# Patient Record
Sex: FEMALE | Race: WHITE | NOT HISPANIC OR LATINO | Employment: OTHER | URBAN - METROPOLITAN AREA
[De-identification: names, ages, dates, MRNs, and addresses within clinical notes are randomized per-mention and may not be internally consistent; named-entity substitution may affect disease eponyms.]

---

## 2017-01-03 ENCOUNTER — ALLSCRIPTS OFFICE VISIT (OUTPATIENT)
Dept: OTHER | Facility: OTHER | Age: 72
End: 2017-01-03

## 2017-01-03 ENCOUNTER — HOSPITAL ENCOUNTER (OUTPATIENT)
Dept: RADIOLOGY | Facility: CLINIC | Age: 72
Discharge: HOME | End: 2017-01-03
Payer: MEDICARE

## 2017-01-03 DIAGNOSIS — M25.511 PAIN IN RIGHT SHOULDER: ICD-10-CM

## 2017-01-03 DIAGNOSIS — M75.50 BURSITIS OF SHOULDER: ICD-10-CM

## 2017-01-03 PROCEDURE — 73030 X-RAY EXAM OF SHOULDER: CPT

## 2017-02-14 ENCOUNTER — ALLSCRIPTS OFFICE VISIT (OUTPATIENT)
Dept: OTHER | Facility: OTHER | Age: 72
End: 2017-02-14

## 2017-03-07 ENCOUNTER — ALLSCRIPTS OFFICE VISIT (OUTPATIENT)
Dept: OTHER | Facility: OTHER | Age: 72
End: 2017-03-07

## 2017-04-07 ENCOUNTER — ALLSCRIPTS OFFICE VISIT (OUTPATIENT)
Dept: OTHER | Facility: OTHER | Age: 72
End: 2017-04-07

## 2017-06-01 ENCOUNTER — GENERIC CONVERSION - ENCOUNTER (OUTPATIENT)
Dept: OTHER | Facility: OTHER | Age: 72
End: 2017-06-01

## 2017-06-03 ENCOUNTER — APPOINTMENT (EMERGENCY)
Dept: RADIOLOGY | Facility: HOSPITAL | Age: 72
End: 2017-06-03
Payer: MEDICARE

## 2017-06-03 ENCOUNTER — HOSPITAL ENCOUNTER (EMERGENCY)
Facility: HOSPITAL | Age: 72
Discharge: HOME | End: 2017-06-03
Attending: EMERGENCY MEDICINE | Admitting: EMERGENCY MEDICINE
Payer: MEDICARE

## 2017-06-03 VITALS
HEART RATE: 81 BPM | WEIGHT: 146 LBS | SYSTOLIC BLOOD PRESSURE: 132 MMHG | OXYGEN SATURATION: 98 % | TEMPERATURE: 98.3 F | BODY MASS INDEX: 28.51 KG/M2 | DIASTOLIC BLOOD PRESSURE: 76 MMHG | RESPIRATION RATE: 18 BRPM

## 2017-06-03 DIAGNOSIS — T07.XXXA MULTIPLE CONTUSIONS: ICD-10-CM

## 2017-06-03 DIAGNOSIS — S63.502A LEFT WRIST SPRAIN, INITIAL ENCOUNTER: Primary | ICD-10-CM

## 2017-06-03 PROCEDURE — 99284 EMERGENCY DEPT VISIT MOD MDM: CPT

## 2017-06-03 PROCEDURE — 73030 X-RAY EXAM OF SHOULDER: CPT

## 2017-06-03 PROCEDURE — 96372 THER/PROPH/DIAG INJ SC/IM: CPT

## 2017-06-03 PROCEDURE — 73110 X-RAY EXAM OF WRIST: CPT

## 2017-06-03 PROCEDURE — 73130 X-RAY EXAM OF HAND: CPT

## 2017-06-03 RX ORDER — OXYCODONE HYDROCHLORIDE AND ACETAMINOPHEN 5; 325 MG/1; MG/1
1 TABLET ORAL EVERY 4 HOURS PRN
Qty: 20 TABLET | Refills: 0 | Status: SHIPPED | OUTPATIENT
Start: 2017-06-03 | End: 2017-06-10

## 2017-06-03 RX ORDER — MORPHINE SULFATE 10 MG/ML
10 INJECTION, SOLUTION INTRAMUSCULAR; INTRAVENOUS ONCE
Status: COMPLETED | OUTPATIENT
Start: 2017-06-03 | End: 2017-06-03

## 2017-06-03 RX ORDER — ATORVASTATIN CALCIUM 20 MG/1
20 TABLET, FILM COATED ORAL EVERY MORNING
Status: ON HOLD | COMMUNITY
End: 2017-10-11

## 2017-06-03 RX ADMIN — MORPHINE SULFATE 10 MG: 10 INJECTION, SOLUTION INTRAMUSCULAR; INTRAVENOUS at 12:50

## 2017-06-16 ENCOUNTER — GENERIC CONVERSION - ENCOUNTER (OUTPATIENT)
Dept: OTHER | Facility: OTHER | Age: 72
End: 2017-06-16

## 2017-07-03 ENCOUNTER — APPOINTMENT (OUTPATIENT)
Dept: PHYSICAL THERAPY | Facility: CLINIC | Age: 72
End: 2017-07-03
Payer: MEDICARE

## 2017-07-03 PROCEDURE — 97162 PT EVAL MOD COMPLEX 30 MIN: CPT

## 2017-07-03 PROCEDURE — G8984 CARRY CURRENT STATUS: HCPCS

## 2017-07-03 PROCEDURE — G8985 CARRY GOAL STATUS: HCPCS

## 2017-07-06 ENCOUNTER — APPOINTMENT (OUTPATIENT)
Dept: PHYSICAL THERAPY | Facility: CLINIC | Age: 72
End: 2017-07-06
Payer: MEDICARE

## 2017-07-06 PROCEDURE — 97140 MANUAL THERAPY 1/> REGIONS: CPT

## 2017-07-06 PROCEDURE — 97110 THERAPEUTIC EXERCISES: CPT

## 2017-07-10 ENCOUNTER — GENERIC CONVERSION - ENCOUNTER (OUTPATIENT)
Dept: OTHER | Facility: OTHER | Age: 72
End: 2017-07-10

## 2017-07-11 ENCOUNTER — APPOINTMENT (OUTPATIENT)
Dept: PHYSICAL THERAPY | Facility: CLINIC | Age: 72
End: 2017-07-11
Payer: MEDICARE

## 2017-07-11 PROCEDURE — 97110 THERAPEUTIC EXERCISES: CPT

## 2017-07-11 PROCEDURE — 97140 MANUAL THERAPY 1/> REGIONS: CPT

## 2017-07-13 ENCOUNTER — APPOINTMENT (OUTPATIENT)
Dept: PHYSICAL THERAPY | Facility: CLINIC | Age: 72
End: 2017-07-13
Payer: MEDICARE

## 2017-07-13 PROCEDURE — 97110 THERAPEUTIC EXERCISES: CPT

## 2017-07-13 PROCEDURE — 97140 MANUAL THERAPY 1/> REGIONS: CPT

## 2017-07-14 ENCOUNTER — APPOINTMENT (OUTPATIENT)
Dept: PHYSICAL THERAPY | Facility: CLINIC | Age: 72
End: 2017-07-14
Payer: MEDICARE

## 2017-07-17 ENCOUNTER — APPOINTMENT (OUTPATIENT)
Dept: PHYSICAL THERAPY | Facility: CLINIC | Age: 72
End: 2017-07-17
Payer: MEDICARE

## 2017-07-21 ENCOUNTER — APPOINTMENT (OUTPATIENT)
Dept: PHYSICAL THERAPY | Facility: CLINIC | Age: 72
End: 2017-07-21
Payer: MEDICARE

## 2017-07-21 PROCEDURE — 97110 THERAPEUTIC EXERCISES: CPT

## 2017-07-21 PROCEDURE — 97140 MANUAL THERAPY 1/> REGIONS: CPT

## 2017-07-24 ENCOUNTER — APPOINTMENT (OUTPATIENT)
Dept: PHYSICAL THERAPY | Facility: CLINIC | Age: 72
End: 2017-07-24
Payer: MEDICARE

## 2017-07-24 PROCEDURE — 97140 MANUAL THERAPY 1/> REGIONS: CPT

## 2017-07-24 PROCEDURE — 97110 THERAPEUTIC EXERCISES: CPT

## 2017-07-26 ENCOUNTER — ALLSCRIPTS OFFICE VISIT (OUTPATIENT)
Dept: OTHER | Facility: OTHER | Age: 72
End: 2017-07-26

## 2017-07-27 ENCOUNTER — APPOINTMENT (OUTPATIENT)
Dept: PHYSICAL THERAPY | Facility: CLINIC | Age: 72
End: 2017-07-27
Payer: MEDICARE

## 2017-08-23 ENCOUNTER — ALLSCRIPTS OFFICE VISIT (OUTPATIENT)
Dept: OTHER | Facility: OTHER | Age: 72
End: 2017-08-23

## 2017-08-31 DIAGNOSIS — M75.82 OTHER SHOULDER LESIONS, LEFT SHOULDER: ICD-10-CM

## 2017-08-31 DIAGNOSIS — M19.019 PRIMARY OSTEOARTHRITIS OF SHOULDER: ICD-10-CM

## 2017-09-06 ENCOUNTER — APPOINTMENT (OUTPATIENT)
Dept: PHYSICAL THERAPY | Facility: CLINIC | Age: 72
End: 2017-09-06
Payer: MEDICARE

## 2017-09-06 PROCEDURE — G8984 CARRY CURRENT STATUS: HCPCS

## 2017-09-06 PROCEDURE — G8985 CARRY GOAL STATUS: HCPCS

## 2017-09-06 PROCEDURE — 97140 MANUAL THERAPY 1/> REGIONS: CPT

## 2017-09-06 PROCEDURE — 97110 THERAPEUTIC EXERCISES: CPT

## 2017-09-12 ENCOUNTER — APPOINTMENT (OUTPATIENT)
Dept: PHYSICAL THERAPY | Facility: CLINIC | Age: 72
End: 2017-09-12
Payer: MEDICARE

## 2017-09-12 PROCEDURE — 97140 MANUAL THERAPY 1/> REGIONS: CPT

## 2017-09-12 PROCEDURE — 97110 THERAPEUTIC EXERCISES: CPT

## 2017-09-14 ENCOUNTER — APPOINTMENT (OUTPATIENT)
Dept: PHYSICAL THERAPY | Facility: CLINIC | Age: 72
End: 2017-09-14
Payer: MEDICARE

## 2017-09-18 ENCOUNTER — GENERIC CONVERSION - ENCOUNTER (OUTPATIENT)
Dept: OTHER | Facility: OTHER | Age: 72
End: 2017-09-18

## 2017-09-18 ENCOUNTER — APPOINTMENT (OUTPATIENT)
Dept: PHYSICAL THERAPY | Facility: CLINIC | Age: 72
End: 2017-09-18
Payer: MEDICARE

## 2017-09-22 ENCOUNTER — OFFICE VISIT (OUTPATIENT)
Dept: LAB | Facility: HOSPITAL | Age: 72
End: 2017-09-22
Attending: ORTHOPAEDIC SURGERY
Payer: MEDICARE

## 2017-09-22 ENCOUNTER — TRANSCRIBE ORDERS (OUTPATIENT)
Dept: ADMINISTRATIVE | Facility: HOSPITAL | Age: 72
End: 2017-09-22

## 2017-09-22 ENCOUNTER — APPOINTMENT (OUTPATIENT)
Dept: PREADMISSION TESTING | Facility: HOSPITAL | Age: 72
End: 2017-09-22
Payer: MEDICARE

## 2017-09-22 VITALS — BODY MASS INDEX: 27.68 KG/M2 | WEIGHT: 141 LBS | HEIGHT: 60 IN

## 2017-09-22 DIAGNOSIS — Z01.818 ENCOUNTER FOR PREADMISSION TESTING: Primary | ICD-10-CM

## 2017-09-22 DIAGNOSIS — Z01.818 ENCOUNTER FOR PREADMISSION TESTING: ICD-10-CM

## 2017-09-22 LAB
ANION GAP SERPL CALCULATED.3IONS-SCNC: 9 MMOL/L (ref 4–13)
APTT PPP: 25 SECONDS (ref 23–35)
BASOPHILS # BLD AUTO: 0 THOUSANDS/ΜL (ref 0–0.1)
BASOPHILS NFR BLD AUTO: 0 % (ref 0–1)
BUN SERPL-MCNC: 15 MG/DL (ref 5–25)
CALCIUM SERPL-MCNC: 9.1 MG/DL (ref 8.3–10.1)
CHLORIDE SERPL-SCNC: 105 MMOL/L (ref 100–108)
CO2 SERPL-SCNC: 27 MMOL/L (ref 21–32)
CREAT SERPL-MCNC: 0.66 MG/DL (ref 0.6–1.3)
EOSINOPHIL # BLD AUTO: 0.2 THOUSAND/ΜL (ref 0–0.61)
EOSINOPHIL NFR BLD AUTO: 4 % (ref 0–6)
ERYTHROCYTE [DISTWIDTH] IN BLOOD BY AUTOMATED COUNT: 15.3 % (ref 11.6–15.1)
GFR SERPL CREATININE-BSD FRML MDRD: 88 ML/MIN/1.73SQ M
GLUCOSE P FAST SERPL-MCNC: 88 MG/DL (ref 65–99)
HCT VFR BLD AUTO: 39 % (ref 37–47)
HGB BLD-MCNC: 12.8 G/DL (ref 12–16)
INR PPP: 1.02 (ref 0.86–1.16)
LYMPHOCYTES # BLD AUTO: 1.9 THOUSANDS/ΜL (ref 0.6–4.47)
LYMPHOCYTES NFR BLD AUTO: 32 % (ref 14–44)
MCH RBC QN AUTO: 25.6 PG (ref 27–31)
MCHC RBC AUTO-ENTMCNC: 32.9 G/DL (ref 31.4–37.4)
MCV RBC AUTO: 78 FL (ref 82–98)
MONOCYTES # BLD AUTO: 0.5 THOUSAND/ΜL (ref 0.17–1.22)
MONOCYTES NFR BLD AUTO: 8 % (ref 4–12)
NEUTROPHILS # BLD AUTO: 3.3 THOUSANDS/ΜL (ref 1.85–7.62)
NEUTS SEG NFR BLD AUTO: 56 % (ref 43–75)
NRBC BLD AUTO-RTO: 0 /100 WBCS
PLATELET # BLD AUTO: 298 THOUSANDS/UL (ref 130–400)
PMV BLD AUTO: 7.4 FL (ref 8.9–12.7)
POTASSIUM SERPL-SCNC: 3.9 MMOL/L (ref 3.5–5.3)
PROTHROMBIN TIME: 10.7 SECONDS (ref 9.4–11.7)
RBC # BLD AUTO: 5.01 MILLION/UL (ref 4.2–5.4)
SODIUM SERPL-SCNC: 141 MMOL/L (ref 136–145)
WBC # BLD AUTO: 5.9 THOUSAND/UL (ref 4.8–10.8)

## 2017-09-22 PROCEDURE — 85730 THROMBOPLASTIN TIME PARTIAL: CPT

## 2017-09-22 PROCEDURE — 85610 PROTHROMBIN TIME: CPT

## 2017-09-22 PROCEDURE — 80048 BASIC METABOLIC PNL TOTAL CA: CPT

## 2017-09-22 PROCEDURE — 93005 ELECTROCARDIOGRAM TRACING: CPT

## 2017-09-22 PROCEDURE — 85025 COMPLETE CBC W/AUTO DIFF WBC: CPT | Performed by: ORTHOPAEDIC SURGERY

## 2017-09-22 PROCEDURE — 36415 COLL VENOUS BLD VENIPUNCTURE: CPT | Performed by: ORTHOPAEDIC SURGERY

## 2017-09-22 RX ORDER — DIPHENOXYLATE HYDROCHLORIDE AND ATROPINE SULFATE 2.5; .025 MG/1; MG/1
1 TABLET ORAL EVERY MORNING
COMMUNITY
End: 2022-05-02

## 2017-09-22 RX ORDER — LANOLIN ALCOHOL/MO/W.PET/CERES
3 CREAM (GRAM) TOPICAL
COMMUNITY
End: 2022-05-02

## 2017-09-22 RX ORDER — LEVOTHYROXINE SODIUM 88 UG/1
88 TABLET ORAL EVERY MORNING
Status: ON HOLD | COMMUNITY
End: 2017-10-11

## 2017-09-22 RX ORDER — ALBUTEROL SULFATE 0.63 MG/3ML
1 SOLUTION RESPIRATORY (INHALATION) EVERY 6 HOURS PRN
COMMUNITY

## 2017-09-22 RX ORDER — FAMOTIDINE 20 MG/1
20 TABLET, FILM COATED ORAL 2 TIMES DAILY
COMMUNITY
End: 2019-01-03

## 2017-09-25 LAB
ATRIAL RATE: 68 BPM
P AXIS: 61 DEGREES
PR INTERVAL: 180 MS
QRS AXIS: -23 DEGREES
QRSD INTERVAL: 72 MS
QT INTERVAL: 428 MS
QTC INTERVAL: 455 MS
T WAVE AXIS: 29 DEGREES
VENTRICULAR RATE: 68 BPM

## 2017-10-06 ENCOUNTER — HOSPITAL ENCOUNTER (INPATIENT)
Facility: HOSPITAL | Age: 72
LOS: 5 days | Discharge: HOME | DRG: 872 | End: 2017-10-11
Attending: EMERGENCY MEDICINE | Admitting: STUDENT IN AN ORGANIZED HEALTH CARE EDUCATION/TRAINING PROGRAM
Payer: MEDICARE

## 2017-10-06 ENCOUNTER — APPOINTMENT (EMERGENCY)
Dept: RADIOLOGY | Facility: HOSPITAL | Age: 72
DRG: 872 | End: 2017-10-06
Payer: MEDICARE

## 2017-10-06 DIAGNOSIS — R10.84 GENERALIZED ABDOMINAL PAIN: ICD-10-CM

## 2017-10-06 DIAGNOSIS — A41.9 SEPSIS (HCC): Primary | ICD-10-CM

## 2017-10-06 PROBLEM — E78.5 HLD (HYPERLIPIDEMIA): Status: ACTIVE | Noted: 2017-10-06

## 2017-10-06 PROBLEM — E03.9 HYPOTHYROIDISM: Status: ACTIVE | Noted: 2017-10-06

## 2017-10-06 PROBLEM — J45.909 ASTHMA: Status: ACTIVE | Noted: 2017-10-06

## 2017-10-06 PROBLEM — K27.9 PUD (PEPTIC ULCER DISEASE): Status: ACTIVE | Noted: 2017-10-06

## 2017-10-06 LAB
ALBUMIN SERPL BCP-MCNC: 3.7 G/DL (ref 3.5–5)
ALP SERPL-CCNC: 102 U/L (ref 46–116)
ALT SERPL W P-5'-P-CCNC: 29 U/L (ref 12–78)
ANION GAP SERPL CALCULATED.3IONS-SCNC: 12 MMOL/L (ref 4–13)
APTT PPP: 22 SECONDS (ref 23–35)
AST SERPL W P-5'-P-CCNC: 24 U/L (ref 5–45)
BACTERIA UR QL AUTO: ABNORMAL /HPF
BASOPHILS # BLD AUTO: 0 THOUSANDS/ΜL (ref 0–0.1)
BASOPHILS NFR BLD AUTO: 0 % (ref 0–1)
BILIRUB SERPL-MCNC: 0.5 MG/DL (ref 0.2–1)
BILIRUB UR QL STRIP: NEGATIVE
BUN SERPL-MCNC: 13 MG/DL (ref 5–25)
CALCIUM SERPL-MCNC: 9.4 MG/DL (ref 8.3–10.1)
CHLORIDE SERPL-SCNC: 104 MMOL/L (ref 100–108)
CK SERPL-CCNC: 77 U/L (ref 26–192)
CLARITY UR: CLEAR
CO2 SERPL-SCNC: 24 MMOL/L (ref 21–32)
COLOR UR: YELLOW
CREAT SERPL-MCNC: 0.86 MG/DL (ref 0.6–1.3)
EOSINOPHIL # BLD AUTO: 0 THOUSAND/ΜL (ref 0–0.61)
EOSINOPHIL NFR BLD AUTO: 1 % (ref 0–6)
ERYTHROCYTE [DISTWIDTH] IN BLOOD BY AUTOMATED COUNT: 15.3 % (ref 11.6–15.1)
GFR SERPL CREATININE-BSD FRML MDRD: 67 ML/MIN/1.73SQ M
GLUCOSE SERPL-MCNC: 100 MG/DL (ref 65–140)
GLUCOSE UR STRIP-MCNC: NEGATIVE MG/DL
HCT VFR BLD AUTO: 37.6 % (ref 37–47)
HGB BLD-MCNC: 12.3 G/DL (ref 12–16)
HGB UR QL STRIP.AUTO: ABNORMAL
INR PPP: 1.04 (ref 0.86–1.16)
KETONES UR STRIP-MCNC: NEGATIVE MG/DL
LACTATE SERPL-SCNC: 2 MMOL/L (ref 0.5–2)
LACTATE SERPL-SCNC: 2.9 MMOL/L (ref 0.5–2)
LACTATE SERPL-SCNC: 3 MMOL/L (ref 0.5–2)
LEUKOCYTE ESTERASE UR QL STRIP: ABNORMAL
LIPASE SERPL-CCNC: 67 U/L (ref 73–393)
LYMPHOCYTES # BLD AUTO: 0.5 THOUSANDS/ΜL (ref 0.6–4.47)
LYMPHOCYTES NFR BLD AUTO: 7 % (ref 14–44)
MCH RBC QN AUTO: 25.8 PG (ref 27–31)
MCHC RBC AUTO-ENTMCNC: 32.6 G/DL (ref 31.4–37.4)
MCV RBC AUTO: 79 FL (ref 82–98)
MONOCYTES # BLD AUTO: 0.2 THOUSAND/ΜL (ref 0.17–1.22)
MONOCYTES NFR BLD AUTO: 3 % (ref 4–12)
NEUTROPHILS # BLD AUTO: 5.9 THOUSANDS/ΜL (ref 1.85–7.62)
NEUTS SEG NFR BLD AUTO: 89 % (ref 43–75)
NITRITE UR QL STRIP: POSITIVE
NON-SQ EPI CELLS URNS QL MICRO: ABNORMAL /HPF
NRBC BLD AUTO-RTO: 0 /100 WBCS
OTHER STN SPEC: ABNORMAL
PH UR STRIP.AUTO: 6 [PH] (ref 5–9)
PLATELET # BLD AUTO: 202 THOUSANDS/UL (ref 130–400)
PLATELET # BLD AUTO: 233 THOUSANDS/UL (ref 130–400)
PLATELET BLD QL SMEAR: ADEQUATE
PMV BLD AUTO: 6.9 FL (ref 8.9–12.7)
PMV BLD AUTO: 7 FL (ref 8.9–12.7)
POTASSIUM SERPL-SCNC: 3.9 MMOL/L (ref 3.5–5.3)
PROT SERPL-MCNC: 7.1 G/DL (ref 6.4–8.2)
PROT UR STRIP-MCNC: NEGATIVE MG/DL
PROTHROMBIN TIME: 10.9 SECONDS (ref 9.4–11.7)
RBC # BLD AUTO: 4.75 MILLION/UL (ref 4.2–5.4)
RBC #/AREA URNS AUTO: ABNORMAL /HPF
SODIUM SERPL-SCNC: 140 MMOL/L (ref 136–145)
SP GR UR STRIP.AUTO: 1.01 (ref 1–1.03)
UROBILINOGEN UR QL STRIP.AUTO: 0.2 E.U./DL
WBC # BLD AUTO: 6.6 THOUSAND/UL (ref 4.8–10.8)
WBC #/AREA URNS AUTO: ABNORMAL /HPF

## 2017-10-06 PROCEDURE — 85049 AUTOMATED PLATELET COUNT: CPT | Performed by: STUDENT IN AN ORGANIZED HEALTH CARE EDUCATION/TRAINING PROGRAM

## 2017-10-06 PROCEDURE — 87086 URINE CULTURE/COLONY COUNT: CPT | Performed by: EMERGENCY MEDICINE

## 2017-10-06 PROCEDURE — 82550 ASSAY OF CK (CPK): CPT | Performed by: STUDENT IN AN ORGANIZED HEALTH CARE EDUCATION/TRAINING PROGRAM

## 2017-10-06 PROCEDURE — 36415 COLL VENOUS BLD VENIPUNCTURE: CPT | Performed by: EMERGENCY MEDICINE

## 2017-10-06 PROCEDURE — 96365 THER/PROPH/DIAG IV INF INIT: CPT

## 2017-10-06 PROCEDURE — 87077 CULTURE AEROBIC IDENTIFY: CPT | Performed by: EMERGENCY MEDICINE

## 2017-10-06 PROCEDURE — 83605 ASSAY OF LACTIC ACID: CPT | Performed by: EMERGENCY MEDICINE

## 2017-10-06 PROCEDURE — 87798 DETECT AGENT NOS DNA AMP: CPT | Performed by: STUDENT IN AN ORGANIZED HEALTH CARE EDUCATION/TRAINING PROGRAM

## 2017-10-06 PROCEDURE — 99285 EMERGENCY DEPT VISIT HI MDM: CPT

## 2017-10-06 PROCEDURE — 83690 ASSAY OF LIPASE: CPT | Performed by: EMERGENCY MEDICINE

## 2017-10-06 PROCEDURE — 87186 SC STD MICRODIL/AGAR DIL: CPT | Performed by: EMERGENCY MEDICINE

## 2017-10-06 PROCEDURE — 87081 CULTURE SCREEN ONLY: CPT | Performed by: STUDENT IN AN ORGANIZED HEALTH CARE EDUCATION/TRAINING PROGRAM

## 2017-10-06 PROCEDURE — 85730 THROMBOPLASTIN TIME PARTIAL: CPT | Performed by: EMERGENCY MEDICINE

## 2017-10-06 PROCEDURE — 81001 URINALYSIS AUTO W/SCOPE: CPT | Performed by: EMERGENCY MEDICINE

## 2017-10-06 PROCEDURE — 96375 TX/PRO/DX INJ NEW DRUG ADDON: CPT

## 2017-10-06 PROCEDURE — 83605 ASSAY OF LACTIC ACID: CPT | Performed by: STUDENT IN AN ORGANIZED HEALTH CARE EDUCATION/TRAINING PROGRAM

## 2017-10-06 PROCEDURE — 85025 COMPLETE CBC W/AUTO DIFF WBC: CPT | Performed by: EMERGENCY MEDICINE

## 2017-10-06 PROCEDURE — 85610 PROTHROMBIN TIME: CPT | Performed by: EMERGENCY MEDICINE

## 2017-10-06 PROCEDURE — 71010 HB CHEST X-RAY 1 VIEW FRONTAL (PORTABLE): CPT

## 2017-10-06 PROCEDURE — 87147 CULTURE TYPE IMMUNOLOGIC: CPT | Performed by: STUDENT IN AN ORGANIZED HEALTH CARE EDUCATION/TRAINING PROGRAM

## 2017-10-06 PROCEDURE — 93005 ELECTROCARDIOGRAM TRACING: CPT | Performed by: EMERGENCY MEDICINE

## 2017-10-06 PROCEDURE — 80053 COMPREHEN METABOLIC PANEL: CPT | Performed by: EMERGENCY MEDICINE

## 2017-10-06 PROCEDURE — 87040 BLOOD CULTURE FOR BACTERIA: CPT | Performed by: EMERGENCY MEDICINE

## 2017-10-06 RX ORDER — LEVOFLOXACIN 5 MG/ML
750 INJECTION, SOLUTION INTRAVENOUS EVERY 24 HOURS
Status: DISCONTINUED | OUTPATIENT
Start: 2017-10-06 | End: 2017-10-09

## 2017-10-06 RX ORDER — ASPIRIN 81 MG/1
81 TABLET, CHEWABLE ORAL EVERY MORNING
Status: DISCONTINUED | OUTPATIENT
Start: 2017-10-07 | End: 2017-10-11 | Stop reason: HOSPADM

## 2017-10-06 RX ORDER — ALBUTEROL SULFATE 2.5 MG/3ML
0.63 SOLUTION RESPIRATORY (INHALATION) EVERY 6 HOURS PRN
Status: DISCONTINUED | OUTPATIENT
Start: 2017-10-06 | End: 2017-10-11 | Stop reason: HOSPADM

## 2017-10-06 RX ORDER — MORPHINE SULFATE 10 MG/ML
6 INJECTION, SOLUTION INTRAMUSCULAR; INTRAVENOUS ONCE
Status: COMPLETED | OUTPATIENT
Start: 2017-10-06 | End: 2017-10-06

## 2017-10-06 RX ORDER — SODIUM CHLORIDE 9 MG/ML
75 INJECTION, SOLUTION INTRAVENOUS CONTINUOUS
Status: DISCONTINUED | OUTPATIENT
Start: 2017-10-06 | End: 2017-10-08

## 2017-10-06 RX ORDER — ATORVASTATIN CALCIUM 20 MG/1
20 TABLET, FILM COATED ORAL
Status: DISCONTINUED | OUTPATIENT
Start: 2017-10-07 | End: 2017-10-11 | Stop reason: HOSPADM

## 2017-10-06 RX ORDER — LEVOTHYROXINE SODIUM 88 UG/1
88 TABLET ORAL
Status: DISCONTINUED | OUTPATIENT
Start: 2017-10-07 | End: 2017-10-11 | Stop reason: HOSPADM

## 2017-10-06 RX ORDER — HEPARIN SODIUM 5000 [USP'U]/ML
5000 INJECTION, SOLUTION INTRAVENOUS; SUBCUTANEOUS EVERY 8 HOURS SCHEDULED
Status: DISCONTINUED | OUTPATIENT
Start: 2017-10-06 | End: 2017-10-11 | Stop reason: HOSPADM

## 2017-10-06 RX ORDER — LANOLIN ALCOHOL/MO/W.PET/CERES
3 CREAM (GRAM) TOPICAL
Status: DISCONTINUED | OUTPATIENT
Start: 2017-10-06 | End: 2017-10-11 | Stop reason: HOSPADM

## 2017-10-06 RX ORDER — FAMOTIDINE 20 MG/1
20 TABLET, FILM COATED ORAL 2 TIMES DAILY
Status: DISCONTINUED | OUTPATIENT
Start: 2017-10-06 | End: 2017-10-11 | Stop reason: HOSPADM

## 2017-10-06 RX ORDER — MORPHINE SULFATE 4 MG/ML
4 INJECTION, SOLUTION INTRAMUSCULAR; INTRAVENOUS ONCE
Status: COMPLETED | OUTPATIENT
Start: 2017-10-06 | End: 2017-10-06

## 2017-10-06 RX ADMIN — MELATONIN TAB 3 MG 3 MG: 3 TAB at 22:31

## 2017-10-06 RX ADMIN — MORPHINE SULFATE 6 MG: 10 INJECTION, SOLUTION INTRAMUSCULAR; INTRAVENOUS at 20:23

## 2017-10-06 RX ADMIN — PIPERACILLIN SODIUM,TAZOBACTAM SODIUM 4.5 G: 4; .5 INJECTION, POWDER, FOR SOLUTION INTRAVENOUS at 19:02

## 2017-10-06 RX ADMIN — LEVOFLOXACIN 750 MG: 5 INJECTION, SOLUTION INTRAVENOUS at 22:43

## 2017-10-06 RX ADMIN — SODIUM CHLORIDE 1000 ML: 0.9 INJECTION, SOLUTION INTRAVENOUS at 19:45

## 2017-10-06 RX ADMIN — HEPARIN SODIUM 5000 UNITS: 5000 INJECTION, SOLUTION INTRAVENOUS; SUBCUTANEOUS at 22:31

## 2017-10-06 RX ADMIN — FAMOTIDINE 20 MG: 20 TABLET ORAL at 22:31

## 2017-10-06 RX ADMIN — SODIUM CHLORIDE 1000 ML: 0.9 INJECTION, SOLUTION INTRAVENOUS at 20:24

## 2017-10-06 RX ADMIN — SODIUM CHLORIDE 1000 ML: 0.9 INJECTION, SOLUTION INTRAVENOUS at 18:57

## 2017-10-06 RX ADMIN — SODIUM CHLORIDE 75 ML/HR: 0.9 INJECTION, SOLUTION INTRAVENOUS at 20:19

## 2017-10-06 RX ADMIN — MORPHINE SULFATE 4 MG: 4 INJECTION, SOLUTION INTRAMUSCULAR; INTRAVENOUS at 19:10

## 2017-10-06 NOTE — ED NOTES
Received pt in semi fowlers on cardiac monitor with ST noted  VSS  Pt reports continued chronic pain to left shoulder (due for sx on Monday) pt additional pain no cough or cold symptoms  Skin hot and flushed  IV#20g noted right forearm  NS first liter completed, additional MD orders noted and will carry out       Oniel Craven RN  10/06/17 1881

## 2017-10-06 NOTE — ED NOTES
At this time pt now reporting sharp epigastric pain, +belchin noted, denies chest pain  No ectopy or change on cardiac monitor noted   MD made aware     Raj Silvestre, RN  10/06/17 1950

## 2017-10-07 LAB
ANION GAP SERPL CALCULATED.3IONS-SCNC: 10 MMOL/L (ref 4–13)
BUN SERPL-MCNC: 10 MG/DL (ref 5–25)
CALCIUM SERPL-MCNC: 8.2 MG/DL (ref 8.3–10.1)
CHLORIDE SERPL-SCNC: 106 MMOL/L (ref 100–108)
CO2 SERPL-SCNC: 23 MMOL/L (ref 21–32)
CREAT SERPL-MCNC: 0.63 MG/DL (ref 0.6–1.3)
ERYTHROCYTE [DISTWIDTH] IN BLOOD BY AUTOMATED COUNT: 15.4 % (ref 11.6–15.1)
FLUAV AG SPEC QL: NORMAL
FLUBV AG SPEC QL: NORMAL
GFR SERPL CREATININE-BSD FRML MDRD: 89 ML/MIN/1.73SQ M
GLUCOSE SERPL-MCNC: 109 MG/DL (ref 65–140)
HCT VFR BLD AUTO: 31.4 % (ref 37–47)
HGB BLD-MCNC: 10.3 G/DL (ref 12–16)
MCH RBC QN AUTO: 25.7 PG (ref 27–31)
MCHC RBC AUTO-ENTMCNC: 32.9 G/DL (ref 31.4–37.4)
MCV RBC AUTO: 78 FL (ref 82–98)
PLATELET # BLD AUTO: 178 THOUSANDS/UL (ref 130–400)
PMV BLD AUTO: 6.9 FL (ref 8.9–12.7)
POTASSIUM SERPL-SCNC: 3.8 MMOL/L (ref 3.5–5.3)
RBC # BLD AUTO: 4.02 MILLION/UL (ref 4.2–5.4)
RSV B RNA SPEC QL NAA+PROBE: NORMAL
SODIUM SERPL-SCNC: 139 MMOL/L (ref 136–145)
WBC # BLD AUTO: 5 THOUSAND/UL (ref 4.8–10.8)

## 2017-10-07 PROCEDURE — 85027 COMPLETE CBC AUTOMATED: CPT | Performed by: STUDENT IN AN ORGANIZED HEALTH CARE EDUCATION/TRAINING PROGRAM

## 2017-10-07 PROCEDURE — 94760 N-INVAS EAR/PLS OXIMETRY 1: CPT

## 2017-10-07 PROCEDURE — 80048 BASIC METABOLIC PNL TOTAL CA: CPT | Performed by: STUDENT IN AN ORGANIZED HEALTH CARE EDUCATION/TRAINING PROGRAM

## 2017-10-07 RX ORDER — ACETAMINOPHEN 325 MG/1
650 TABLET ORAL EVERY 6 HOURS PRN
Status: DISCONTINUED | OUTPATIENT
Start: 2017-10-07 | End: 2017-10-11 | Stop reason: HOSPADM

## 2017-10-07 RX ADMIN — FAMOTIDINE 20 MG: 20 TABLET ORAL at 17:13

## 2017-10-07 RX ADMIN — ACETAMINOPHEN 650 MG: 325 TABLET, FILM COATED ORAL at 15:51

## 2017-10-07 RX ADMIN — LEVOFLOXACIN 750 MG: 5 INJECTION, SOLUTION INTRAVENOUS at 20:42

## 2017-10-07 RX ADMIN — HEPARIN SODIUM 5000 UNITS: 5000 INJECTION, SOLUTION INTRAVENOUS; SUBCUTANEOUS at 14:08

## 2017-10-07 RX ADMIN — MELATONIN TAB 3 MG 3 MG: 3 TAB at 22:34

## 2017-10-07 RX ADMIN — LEVOTHYROXINE SODIUM 88 MCG: 88 TABLET ORAL at 06:09

## 2017-10-07 RX ADMIN — HEPARIN SODIUM 5000 UNITS: 5000 INJECTION, SOLUTION INTRAVENOUS; SUBCUTANEOUS at 06:09

## 2017-10-07 RX ADMIN — ATORVASTATIN CALCIUM 20 MG: 20 TABLET, FILM COATED ORAL at 15:52

## 2017-10-07 RX ADMIN — FAMOTIDINE 20 MG: 20 TABLET ORAL at 09:25

## 2017-10-07 RX ADMIN — HEPARIN SODIUM 5000 UNITS: 5000 INJECTION, SOLUTION INTRAVENOUS; SUBCUTANEOUS at 22:35

## 2017-10-07 NOTE — SEPSIS NOTE
Sepsis Note   Chuy Chamberlain 67 y o  female MRN: [id number]  Unit/Bed#: ED 08 Encounter: [id number]            Initial Sepsis Screening     Row Name 10/06/17 2023 10/06/17 1915             Is the patient's history suggestive of a new or worsening infection? (!)  Yes (Proceed)  -SR         Suspected source of infection suspect infection, source unknown  -SR suspect infection, source unknown  -JS       Are two or more of the following signs & symptoms of infection both present and new to the patient? (!)  Yes (Proceed)  -SR         Indicate SIRS criteria Hyperthemia > 38 3C (100 9F); Tachycardia > 90 bpm  -SR Hyperthemia > 38 3C (100 9F); Tachycardia > 90 bpm  -JS       If the answer is yes to both questions, suspicion of sepsis is present  [de-identified]         If severe sepsis is present AND tissue hypoperfusion perists in the hour after fluid resuscitation or lactate > 4, the patient meets criteria for SEPTIC SHOCK           Are any of the following organ dysfunction criteria present within 6 hours of suspected infection and SIRS criteria that are NOT considered to be chronic conditions? (!)  Yes  -SR (!)  Yes  -JS       Organ dysfunction Lactate > 2 0 mmol/L  -SR Lactate > 2 0 mmol/L  -JS       Date of presentation of severe sepsis           Time of presentation of severe sepsis           Tissue hypoperfusion persists in the hour after crystalloid fluid administration, evidenced, by either:           Was hypotension present within one hour of the conclusion of crystalloid fluid administration?  No  -SR No  -JS       Date of presentation of septic shock           Time of presentation of septic shock             User Key  (r) = Recorded By, (t) = Taken By, (c) = Cosigned By    [address] Name Provider Jaime Murphy MD Physician    Abel Britton DO Physician

## 2017-10-07 NOTE — SOCIAL WORK
DASH discussion completed  Discussed goals of making sure pt's needs are met upon discharge, pt's preferences are taken into account, pt understands her health condition, medications and symptoms to watch for after returning home and pt is aware of any follow up appointments recommended by hospital physician  SPOKE WITH THE PT AND HER SISTERS AT THE BEDSIDE  PER PT, SHE LIVES WITH HER  AND HAS A CANE AT HOME  PT STATED THAT SHE HAS DEFICITS TO HER UPPER EXTREMITIES AS SHE NEEDS SHOULDER SURGERY BUT HAS A CANE IF NEEDED FOR AMBULATION  PT WOULD LIKE TO SEE IF SHE CAN GET A BSC PRIOR TO DC AS SHE HAS DIFFICULTY WITH GETTING UP OUT OF HER CHAIR AND THEN GETTING TO THE BATHROOM IN TIME  HAVING A CLOSER CHAIR WOULD BE HELPFUL  WILL CONTINUE TO FOLLOW PT PROGRESS FOR THIS DME  PT UNCLEAR IF ABLE TO HAVE SURGERY, QUESTION IF WILL BE DURING THIS STAY OR AS AN OUTPT  CM WILL CONTINUE TO FOLLOW FOR COURSE OF STAY AND DCP NEEDS    PT USES [facility] ON [facility]

## 2017-10-07 NOTE — SEPSIS NOTE
Sepsis Note   Elan Martins 67 y o  female MRN: [id number]  Unit/Bed#: [address] [place] [address] Encounter: [id number]            Initial Sepsis Screening     Row Name 10/06/17 2023 10/06/17 1915             Is the patient's history suggestive of a new or worsening infection? (!)  Yes (Proceed)  -SR         Suspected source of infection suspect infection, source unknown  -SR suspect infection, source unknown  -JS       Are two or more of the following signs & symptoms of infection both present and new to the patient? (!)  Yes (Proceed)  -SR         Indicate SIRS criteria Hyperthemia > 38 3C (100 9F); Tachycardia > 90 bpm  -SR Hyperthemia > 38 3C (100 9F); Tachycardia > 90 bpm  -JS       If the answer is yes to both questions, suspicion of sepsis is present  [de-identified]         If severe sepsis is present AND tissue hypoperfusion perists in the hour after fluid resuscitation or lactate > 4, the patient meets criteria for SEPTIC SHOCK           Are any of the following organ dysfunction criteria present within 6 hours of suspected infection and SIRS criteria that are NOT considered to be chronic conditions? (!)  Yes  -SR (!)  Yes  -JS       Organ dysfunction Lactate > 2 0 mmol/L  -SR Lactate > 2 0 mmol/L  -JS       Date of presentation of severe sepsis           Time of presentation of severe sepsis           Tissue hypoperfusion persists in the hour after crystalloid fluid administration, evidenced, by either:           Was hypotension present within one hour of the conclusion of crystalloid fluid administration?  No  -SR No  -JS       Date of presentation of septic shock           Time of presentation of septic shock             User Key  (r) = Recorded By, (t) = Taken By, (c) = Cosigned By    [address] Name Provider Carolyn Price MD Physician    Zay [id number],  Physician               Default Flowsheet Data (last 720 hours)      Sepsis Reassessment     Row Name 10/07/17 0005                   Volume Status and Tissue Perfusion Post Fluid Resuscitation- Must Document ALL of the Following:    Vital Signs Reviewed Yes  -SR        Cardio Normal S1/S2  -SR        Pulmonary Normal effort  -SR        Capillary Refill Brisk  -SR        Peripheral Pulses Radial  -SR        Peripheral Pulse +2  -SR        Skin Warm  -SR           *OR*   Intensive Monitoring- Must Document Two * of the Following Four *:    Vital Signs Reviewed          * Central Venous Pressure (CVP or RAP)          * Central Venous Oxygen (SVO2, ScvO2 or Oxygen saturation via central catheter)          * Bedside Cardiovascular US in IVC diameter and % collapse          * Passive Leg Raise OR Crystalloid Challenge            User Key  (r) = Recorded By, (t) = Taken By, (c) = Cosigned By    Initials Name Provider Type    Kirt Vale MD Physician

## 2017-10-07 NOTE — ED NOTES
Pt left ed at this time in no apparent distress, 3rd liter NS infusing without difficulty     Saturnino Vela, RN  10/06/17 1289

## 2017-10-07 NOTE — PLAN OF CARE

## 2017-10-07 NOTE — PROGRESS NOTES
[facility] Progress Note  Patient: Molina Dobbins 67 y o  female   MRN: [id number]  PCP: Júnior Zheng  Unit/Bed#: [address] [place] [address] Encounter: [id number]  Date Of Visit: 10/07/17    Problem List:    Principal Problem:    Sepsis ([address] )  Active Problems:    Hypothyroidism    HLD (hyperlipidemia)    Asthma    PUD (peptic ulcer disease)      Assessment & Plan:    1  Sepsis likely secondary urinary tract infection-follow up final urine and blood cultures  Continue IV Levaquin 70 milligrams daily for now  2  Hypothyroidism-continue levothyroxine 88 microgram p o  daily  3  Hyperlipidemia-continue Lipitor 20 milligram p o  daily  4  Asthma-no evidence of exacerbation  Continue albuterol p r n   5  Peptic ulcer disease-continue Pepcid      VTE Pharmacologic Prophylaxis:   Pharmacologic: Enoxaparin (Lovenox)  Mechanical VTE Prophylaxis in Place: Yes    Patient Centered Rounds: I have performed bedside rounds with nursing staff today  Discussions with Specialists or Other Care Team Provider: No    Education and Discussions with Family / Patient:Yes    Time Spent for Care: 30 minutes  More than 50% of total time spent on counseling and coordination of care as described above  Current Length of Stay: 1 day(s)    Current Patient Status: Inpatient     Discharge Plan: Home    Code Status: Level 1 - Full Code      Subjective:   Patient reported 3 episodes of vomiting last night  Patient also complains of epigastric abdominal pain and also suprapubic abdominal pain  Patient denies any chest pain, shortness of breath, cough, diarrhea or urinary complaints  Objective:         Negative for Chest Pain, Palpitations, Shortness of Breath, Abdominal Pain, Nausea, Vomiting, Constipation, Diarrhea, Dizziness  All other 10 review of systems negative and without drastic interval changes from yesterday      Vitals:   Temp (24hrs), Av 3 °F (37 9 °C), Min:97 8 °F (36 6 °C), Max:103 9 °F (39 9 °C)    HR: [] 72  Resp:  [16-20] 20  BP: (103-163)/(56-81) 118/73  SpO2:  [92 %-95 %] 95 %  Body mass index is 27 34 kg/m²  Input and Output Summary (last 24 hours): Intake/Output Summary (Last 24 hours) at 10/07/17 1657  Last data filed at 10/07/17 1200   Gross per 24 hour   Intake             1000 ml   Output             1025 ml   Net              -25 ml       Physical Exam:     Physical Exam   Constitutional: No distress  HENT:   Head: Normocephalic and atraumatic  Nose: Nose normal    Eyes: Conjunctivae and EOM are normal  Pupils are equal, round, and reactive to light  Neck: Normal range of motion  Neck supple  No JVD present  Cardiovascular: Normal rate and regular rhythm  Exam reveals no gallop and no friction rub  Murmur heard  Pulmonary/Chest: Effort normal and breath sounds normal  No respiratory distress  She has no wheezes  She has no rales  She exhibits no tenderness  Abdominal: Soft  Bowel sounds are normal  She exhibits no distension  There is no tenderness  There is no rebound and no guarding  Musculoskeletal: She exhibits no edema  Neurological: She is alert  No cranial nerve deficit  Skin: Skin is warm and dry  No rash noted  Psychiatric: She has a normal mood and affect  Additional Data:     Labs:      Results from last 7 days  Lab Units 10/07/17  0535  10/06/17  1808   WBC Thousand/uL 5 00  --  6 60   HEMOGLOBIN g/dL 10 3*  --  12 3   HEMATOCRIT % 31 4*  --  37 6   PLATELETS Thousands/uL 178  < > 233   NEUTROS PCT %  --   --  89*   LYMPHS PCT %  --   --  7*   MONOS PCT %  --   --  3*   EOS PCT %  --   --  1   < > = values in this interval not displayed      Results from last 7 days  Lab Units 10/07/17  0535 10/06/17  1808   SODIUM mmol/L 139 140   POTASSIUM mmol/L 3 8 3 9   CHLORIDE mmol/L 106 104   CO2 mmol/L 23 24   BUN mg/dL 10 13   CREATININE mg/dL 0 63 0 86   CALCIUM mg/dL 8 2* 9 4   TOTAL PROTEIN g/dL  --  7 1   BILIRUBIN TOTAL mg/dL  --  0 50   ALK PHOS U/L --  102   ALT U/L  --  29   AST U/L  --  24   GLUCOSE RANDOM mg/dL 109 100       Results from last 7 days  Lab Units 10/06/17  1808   INR  1 04       * I Have Reviewed All Lab Data Listed Above  * Additional Pertinent Lab Tests Reviewed: L[address] 65 Admission Reviewed    Imaging:  Xr Chest 1 View Portable    Result Date: 10/6/2017  Narrative: CHEST INDICATION:  Fever COMPARISON:  Chest radiograph 2/14/2016 VIEWS:   AP frontal IMAGES:  1 FINDINGS:     Cardiomediastinal silhouette appears unremarkable  There is platelike atelectasis at the left lung base  No large pleural effusion or evident pneumothorax  Visualized osseous structures appear within normal limits for the patient's age  Impression: Left basilar atelectasis  Workstation performed: [id number]     Imaging Reports Reviewed by myself    Cultures:   Blood Culture: No results found for: BLOODCX  Urine Culture: No results found for: URINECX  Sputum Culture: No components found for: SPUTUMCX  Wound Culture: No results found for: WOUNDCULT    Last 24 Hours Medication List:     aspirin 81 mg Oral QAM   atorvastatin 20 mg Oral Daily With Dinner   famotidine 20 mg Oral BID   heparin (porcine) 5,000 Units Subcutaneous Q8H [address]   levofloxacin 750 mg Intravenous Q24H   levothyroxine 88 mcg Oral Early Morning   melatonin 3 mg Oral HS   sodium chloride 1,000 mL Intravenous Once   Followed by      sodium chloride 1,000 mL Intravenous Once        Today, Patient Was Seen By: Haylee Whittaker MD    ** Please Note: Dragon 360 Dictation voice to text software may have been used in the creation of this document   **

## 2017-10-07 NOTE — ED PROVIDER NOTES
History  Chief Complaint   Patient presents with    Fever - 9 weeks to 74 years     pt states that she had a flu shot aroun 1300  pt states that she spiked a fever shortly after  pt presents to the ed with a temp of 103 9  oral  pt has no other complaints      Patient presents for evaluation of fever  Patient states she got flu shot at her doctors office around 1300 and then got a fever shortly afterwards  Denies dysuria, cough or congestion  History provided by:  Patient   used: No    Fever - 9 weeks to 74 years       Prior to Admission Medications   Prescriptions Last Dose Informant Patient Reported? Taking? NON FORMULARY 10/5/2017 at 2100  Yes Yes   Sig: daily at bedtime Stool softner   NON FORMULARY 10/5/2017 at 2100  Yes Yes   Si capsule daily at bedtime as needed Oxy powder-   POTASSIUM CHLORIDE PO Past Week at Unknown time  Yes Yes   Sig: Take by mouth every morning   Probiotic Product (PROBIOTIC DAILY PO) Past Week at Unknown time  Yes Yes   Sig: Take by mouth every morning   albuterol (ACCUNEB) 0 63 MG/3ML nebulizer solution More than a month at Unknown time  Yes No   Sig: Take 1 ampule by nebulization every 6 (six) hours as needed for wheezing   albuterol (PROVENTIL HFA,VENTOLIN HFA) 90 mcg/act inhaler 10/6/2017 at 1300  Yes Yes   Sig: Inhale 2 puffs every 6 (six) hours as needed for wheezing     aspirin 81 mg chewable tablet Past Week at Unknown time  Yes Yes   Sig: Chew 81 mg every morning     atorvastatin (LIPITOR) 20 mg tablet 10/6/2017 at 0700  Yes Yes   Sig: Take 20 mg by mouth every morning     baclofen 20 mg tablet More than a month at Unknown time  Yes No   Sig: Take 20 mg by mouth as needed     cycloSPORINE (RESTASIS) 0 05 % ophthalmic emulsion 10/6/2017 at 0700  Yes Yes   Sig: Administer 1 drop to both eyes 2 (two) times a day     famotidine (PEPCID) 20 mg tablet 10/6/2017 at 0700  Yes Yes   Sig: Take 20 mg by mouth 2 (two) times a day   levothyroxine 88 mcg tablet 10/6/2017 at 0600  Yes Yes   Sig: Take 88 mcg by mouth every morning   melatonin 3 mg Past Week at Unknown time  Yes Yes   Sig: Take 3 mg by mouth daily at bedtime as needed   mometasone (NASONEX) 50 mcg/act nasal spray More than a month at Unknown time  Yes No   Si sprays into each nostril as needed     multivitamin (THERAGRAN) TABS Past Week at Unknown time  Yes Yes   Sig: Take 1 tablet by mouth every morning      Facility-Administered Medications: None       Past Medical History:   Diagnosis Date    Ankle fracture, right     casted    Anxiety     Arthritis     Asthma     Bursitis     Constipation     Cystitis     chronic    Diverticulosis     Fibromyalgia     Fibromyalgia, primary     GERD (gastroesophageal reflux disease)     H/o Lyme disease     Hiatal hernia     History of transfusion     Hyperlipidemia     Hypothyroid     hypo    Lichen sclerosus of female genitalia 2016    Murmur     Neck pain, chronic     gets steroid injections-none since late 2016    Neuralgia     Spinal stenosis     Ulcer (HCC)     Ulcer of gastric fundus     Wears glasses     Wears partial dentures     upper and lower       Past Surgical History:   Procedure Laterality Date    ABDOMINAL SURGERY      exploratory-removal of appendix    APPENDECTOMY      CARPAL TUNNEL RELEASE Bilateral     CHOLECYSTECTOMY      COLONOSCOPY      DILATION AND CURETTAGE OF UTERUS      x3 miscarriages    FOOT SURGERY Right     5th toe-hammertoe repair    HYSTERECTOMY      complete    ROTATOR CUFF REPAIR Right     TONSILLECTOMY         Family History   Problem Relation Age of Onset    Cancer Mother      colon    Alzheimer's disease Father     Heart disease Sister      MI x4-angioplasty x4 stents    Cancer Brother      brain tumor-age 6    Cancer Brother 72     prostate     I have reviewed and agree with the history as documented      Social History   Substance Use Topics    Smoking status: Never Smoker    Smokeless tobacco: Never Used    Alcohol use No        Review of Systems   Constitutional: Positive for fever  All other systems reviewed and are negative  Physical Exam  ED Triage Vitals   Temperature Pulse Respirations Blood Pressure SpO2   10/06/17 1802 10/06/17 1802 10/06/17 1802 10/06/17 1802 10/06/17 1802   (!) 103 9 °F (39 9 °C) (!) 121 18 163/73 93 %      Temp Source Heart Rate Source Patient Position - Orthostatic VS BP Location FiO2 (%)   10/06/17 1802 10/06/17 1802 10/06/17 1942 10/06/17 1802 --   Oral Monitor Sitting Right arm       Pain Score       10/06/17 1857       Worst Possible Pain           Physical Exam   Constitutional: She is oriented to person, place, and time  No distress  HENT:   Mouth/Throat: Oropharynx is clear and moist    Eyes: Pupils are equal, round, and reactive to light  Neck: Normal range of motion  Neck supple  Cardiovascular: Regular rhythm and intact distal pulses  Tachycardia present  Pulmonary/Chest: Effort normal and breath sounds normal  No respiratory distress  Abdominal: Soft  Bowel sounds are normal  There is no tenderness  Musculoskeletal: Normal range of motion  Neurological: She is alert and oriented to person, place, and time  Skin: Capillary refill takes less than 2 seconds  She is not diaphoretic  Nursing note and vitals reviewed        ED Medications  Medications   sodium chloride 0 9 % infusion (75 mL/hr Intravenous New Bag 10/6/17 2019)   sodium chloride 0 9 % bolus 1,000 mL (1,000 mL Intravenous Not Given 10/6/17 2015)     Followed by   sodium chloride 0 9 % bolus 1,000 mL (1,000 mL Intravenous Not Given 10/6/17 2045)   levofloxacin (LEVAQUIN) IVPB (premix) 750 mg (750 mg Intravenous New Bag 10/6/17 2243)   albuterol inhalation solution 0 63 mg (not administered)   aspirin chewable tablet 81 mg (not administered)   atorvastatin (LIPITOR) tablet 20 mg (not administered)   famotidine (PEPCID) tablet 20 mg (20 mg Oral Given 10/6/17 2231) levothyroxine tablet 88 mcg (not administered)   melatonin tablet 3 mg (3 mg Oral Given 10/6/17 2231)   heparin (porcine) subcutaneous injection 5,000 Units (5,000 Units Subcutaneous Given 10/6/17 2231)   sodium chloride 0 9 % bolus 1,000 mL (0 mL Intravenous Stopped 10/6/17 1943)   sodium chloride 0 9 % bolus 1,000 mL (0 mL Intravenous Stopped 10/6/17 2134)   piperacillin-tazobactam (ZOSYN) IVPB 4 5 g (0 g Intravenous Stopped 10/6/17 1943)   morphine (PF) 4 mg/mL injection 4 mg (4 mg Intravenous Given 10/6/17 1910)   sodium chloride 0 9 % bolus 1,000 mL (0 mL Intravenous Stopped 10/6/17 2135)   morphine (PF) 10 mg/mL injection 6 mg (6 mg Intravenous Given 10/6/17 2023)       Diagnostic Studies  Labs Reviewed   CBC AND DIFFERENTIAL - Abnormal        Result Value Ref Range Status    MCV 79 (*) 82 - 98 fL Final    MCH 25 8 (*) 27 0 - 31 0 pg Final    RDW 15 3 (*) 11 6 - 15 1 % Final    MPV 7 0 (*) 8 9 - 12 7 fL Final    Neutrophils Relative 89 (*) 43 - 75 % Final    Lymphocytes Relative 7 (*) 14 - 44 % Final    Monocytes Relative 3 (*) 4 - 12 % Final    Lymphocytes Absolute 0 50 (*) 0 60 - 4 47 Thousands/µL Final    WBC 6 60  4 80 - 10 80 Thousand/uL Final    RBC 4 75  4 20 - 5 40 Million/uL Final    Hemoglobin 12 3  12 0 - 16 0 g/dL Final    Hematocrit 37 6  37 0 - 47 0 % Final    MCHC 32 6  31 4 - 37 4 g/dL Final    Platelets 805  495 - 400 Thousands/uL Final    nRBC 0  /100 WBCs Final    Eosinophils Relative 1  0 - 6 % Final    Basophils Relative 0  0 - 1 % Final    Neutrophils Absolute 5 90  1 85 - 7 62 Thousands/µL Final    Monocytes Absolute 0 20  0 17 - 1 22 Thousand/µL Final    Eosinophils Absolute 0 00  0 00 - 0 61 Thousand/µL Final    Basophils Absolute 0 00  0 00 - 0 10 Thousands/µL Final   APTT - Abnormal     PTT 22 (*) 23 - 35 seconds Final    Narrative:      Therapeutic Heparin Range = 60-90 seconds   UA W REFLEX TO MICROSCOPIC WITH REFLEX TO CULTURE - Abnormal     Leukocytes, UA Moderate (*) Negative Final    Nitrite, UA Positive (*) Negative Final    Blood, UA Trace-Intact (*) Negative Final    Color, UA Yellow   Final    Clarity, UA Clear   Final    Specific [place], UA 1 010  1 000 - 1 030 Final    pH, UA 6 0  5 0 - 9 0 Final    Protein, UA Negative  Negative mg/dl Final    Glucose, UA Negative  Negative mg/dl Final    Ketones, UA Negative  Negative mg/dl Final    Urobilinogen, UA 0 2  0 2, 1 0 E U /dl E U /dl Final    Bilirubin, UA Negative  Negative Final   LACTIC ACID, PLASMA - Abnormal     LACTIC ACID 3 0 (*) 0 5 - 2 0 mmol/L Final    Narrative:     Result may be elevated if tourniquet was used during collection  LIPASE - Abnormal     Lipase 67 (*) 73 - 393 u/L Final   LACTIC ACID, PLASMA - Abnormal     LACTIC ACID 2 9 (*) 0 5 - 2 0 mmol/L Final    Narrative:     Result may be elevated if tourniquet was used during collection     URINE MICROSCOPIC - Abnormal     RBC, UA 1-2 (*) None Seen, 0-5 /hpf Final    WBC, UA 20-30 (*) None Seen, 0-5, 5-55, 5-65 /hpf Final    Bacteria, UA Moderate (*) None Seen, Occasional /hpf Final    Epithelial Cells Occasional  None Seen, Occasional /hpf Final    OTHER OBSERVATIONS Renal Epithelial Cells Present   Final   PROTIME-INR - Normal    Protime 10 9  9 4 - 11 7 seconds Final    INR 1 04  0 86 - 1 16 Final   CK - Normal    Total CK 77  26 - 192 U/L Final   SMEAR REVIEW(PHLEBS DO NOT ORDER) - Normal    Platelet Estimate Adequate  Adequate Final   BLOOD CULTURE   BLOOD CULTURE   INFLUENZA A/B AND RSV, PCR   URINE CULTURE   COMPREHENSIVE METABOLIC PANEL    Sodium 983  136 - 145 mmol/L Final    Potassium 3 9  3 5 - 5 3 mmol/L Final    Chloride 104  100 - 108 mmol/L Final    CO2 24  21 - 32 mmol/L Final    Anion Gap 12  4 - 13 mmol/L Final    BUN 13  5 - 25 mg/dL Final    Creatinine 0 86  0 60 - 1 30 mg/dL Final    Comment: Standardized to IDMS reference method    Glucose 100  65 - 140 mg/dL Final    Comment:   If the patient is fasting, the ADA then defines impaired fasting glucose as > 100 mg/dL and diabetes as > or equal to 123 mg/dL  Specimen collection should occur prior to Sulfasalazine administration due to the potential for falsely depressed results  Specimen collection should occur prior to Sulfapyridine administration due to the potential for falsely elevated results  Calcium 9 4  8 3 - 10 1 mg/dL Final    AST 24  5 - 45 U/L Final    Comment:   Specimen collection should occur prior to Sulfasalazine administration due to the potential for falsely depressed results  ALT 29  12 - 78 U/L Final    Comment:   Specimen collection should occur prior to Sulfasalazine administration due to the potential for falsely depressed results  Alkaline Phosphatase 102  46 - 116 U/L Final    Total Protein 7 1  6 4 - 8 2 g/dL Final    Albumin 3 7  3 5 - 5 0 g/dL Final    Total Bilirubin 0 50  0 20 - 1 00 mg/dL Final    eGFR 67  ml/min/1 73sq m Final    Narrative:     National Kidney Disease Education Program recommendations are as follows:  GFR calculation is accurate only with a steady state creatinine  Chronic Kidney disease less than 60 ml/min/1 73 sq  meters  Kidney failure less than 15 ml/min/1 73 sq  meters  LACTIC ACID, PLASMA       XR chest 1 view portable   Final Result      Left basilar atelectasis  Workstation performed: [id number]             Procedures  Procedures      Phone Contacts  ED Phone Contact    ED Course  ED Course as of Oct 06 2336   Fri Oct 06, 2017   1807 Pulse ox 93% on RA indicating adequatye SpO2: 93 %                         Initial Sepsis Screening     Row Name 10/06/17 2023 10/06/17 1915             Is the patient's history suggestive of a new or worsening infection? (!)  Yes (Proceed)  -SR         Suspected source of infection suspect infection, source unknown  -SR suspect infection, source unknown  -JS       Are two or more of the following signs & symptoms of infection both present and new to the patient?  (!)  Yes (Proceed)  -SR   Indicate SIRS criteria Hyperthemia > 38 3C (100 9F); Tachycardia > 90 bpm  -SR Hyperthemia > 38 3C (100 9F); Tachycardia > 90 bpm  -JS       If the answer is yes to both questions, suspicion of sepsis is present  [de-identified]         If severe sepsis is present AND tissue hypoperfusion perists in the hour after fluid resuscitation or lactate > 4, the patient meets criteria for SEPTIC SHOCK           Are any of the following organ dysfunction criteria present within 6 hours of suspected infection and SIRS criteria that are NOT considered to be chronic conditions? (!)  Yes  -SR (!)  Yes  -JS       Organ dysfunction Lactate > 2 0 mmol/L  -SR Lactate > 2 0 mmol/L  -JS       Date of presentation of severe sepsis           Time of presentation of severe sepsis           Tissue hypoperfusion persists in the hour after crystalloid fluid administration, evidenced, by either:           Was hypotension present within one hour of the conclusion of crystalloid fluid administration?  No  -SR No  -JS       Date of presentation of septic shock           Time of presentation of septic shock             User Key  (r) = Recorded By, (t) = Taken By, (c) = Cosigned By    [address] Name Provider Harriet Navarrete MD Physician    Ezequiel Clark,  Physician                  MDM  Number of Diagnoses or Management Options  Sepsis [facility]):   Diagnosis management comments: CXR: NAD as read by me       Amount and/or Complexity of Data Reviewed  Clinical lab tests: ordered and reviewed  Tests in the radiology section of CPT®: reviewed and ordered  Discuss the patient with other providers: yes  Independent visualization of images, tracings, or specimens: yes    Patient Progress  Patient progress: stable    CritCare Time    Disposition  Final diagnoses:   Sepsis [facility])     ED Disposition     ED Disposition Condition Comment    Admit  Case was discussed with Dr Santos Ramírez and the patient's admission status was agreed to be admission to the service of Dr Calvin Jacques  Follow-up Information    None       Current Discharge Medication List      CONTINUE these medications which have NOT CHANGED    Details   albuterol (PROVENTIL HFA,VENTOLIN HFA) 90 mcg/act inhaler Inhale 2 puffs every 6 (six) hours as needed for wheezing  aspirin 81 mg chewable tablet Chew 81 mg every morning        atorvastatin (LIPITOR) 20 mg tablet Take 20 mg by mouth every morning        cycloSPORINE (RESTASIS) 0 05 % ophthalmic emulsion Administer 1 drop to both eyes 2 (two) times a day        famotidine (PEPCID) 20 mg tablet Take 20 mg by mouth 2 (two) times a day      levothyroxine 88 mcg tablet Take 88 mcg by mouth every morning      melatonin 3 mg Take 3 mg by mouth daily at bedtime as needed      multivitamin (THERAGRAN) TABS Take 1 tablet by mouth every morning      !! NON FORMULARY daily at bedtime Stool softner      !! NON FORMULARY 1 capsule daily at bedtime as needed Oxy powder-      POTASSIUM CHLORIDE PO Take by mouth every morning      Probiotic Product (PROBIOTIC DAILY PO) Take by mouth every morning      albuterol (ACCUNEB) 0 63 MG/3ML nebulizer solution Take 1 ampule by nebulization every 6 (six) hours as needed for wheezing      baclofen 20 mg tablet Take 20 mg by mouth as needed        mometasone (NASONEX) 50 mcg/act nasal spray 2 sprays into each nostril as needed         !! - Potential duplicate medications found  Please discuss with provider  No discharge procedures on file      ED Provider  Electronically Signed by       Mel Keene DO  10/06/17 8989

## 2017-10-07 NOTE — PLAN OF CARE
INFECTION - ADULT     Absence or prevention of progression during hospitalization Progressing     Absence of fever/infection during neutropenic period Progressing        PAIN - ADULT     Verbalizes/displays adequate comfort level or baseline comfort level Progressing        Potential for Falls     Patient will remain free of falls Progressing        RESPIRATORY - ADULT     Achieves optimal ventilation and oxygenation Progressing

## 2017-10-07 NOTE — H&P
History and Physical - [facility] Internal Medicine    Patient Information: Molina Dobbins 67 y o  female MRN: [id number]  Unit/Bed#: ED 08 Encounter: [id number]  Admitting Physician: Grant Jaime MD  PCP: Júnior Zheng  Date of Admission:  10/06/17    Chief Complaint:     Fever, chills, sinus congestion     History of Present Illness:    Molina Dobbins is a 67 y o  female with a PMH of Hypothyroidism, HLD, Asthma and PUD who presents with fever, chills, and sinus congestion  She states that she received her flu shot around 1pm today and several hours afterwards began to have shaking chills  She checked her temperature which was 103 9F  She also noticed diffuse myalgias  Due to this she came to the ED for further evaluation  She denies any recent hospitalizations but does report that she had one day of dysuria several days ago which resolved spontaneously  She also reports being told by her physician that she had a "C diff infection" several weeks ago but did not receive antibiotics  Currently she reports mild midepigastric pain which began several minutes ago and her chronic left shoulder pain  She denies any cough but does reports some sinus congestion  She denies any chest pain, shortness of breath, diarrhea, dysuria, melena or blood in her stools  No other complaints or concerns  Review of Systems:    Review of Systems   Constitutional: Positive for chills and fever  HENT: Positive for congestion and sinus pressure  Respiratory: Negative for cough and shortness of breath  Cardiovascular: Negative for chest pain and leg swelling  Gastrointestinal: Positive for abdominal pain  Negative for blood in stool and nausea  Genitourinary: Positive for dysuria  Negative for hematuria  Musculoskeletal: Positive for arthralgias  Neurological: Negative for syncope  Psychiatric/Behavioral: Negative for confusion         Past Medical and Surgical History:     Past Medical History:   Diagnosis Date  Ankle fracture, right     casted    Anxiety     Arthritis     Bursitis     Constipation     Cystitis     chronic    Diverticulosis     Fibromyalgia     Fibromyalgia, primary     GERD (gastroesophageal reflux disease)     H/o Lyme disease     Hiatal hernia     History of transfusion     Hyperlipidemia     Hypothyroid     hypo    Lichen sclerosus of female genitalia 2016    Murmur     Neck pain, chronic     gets steroid injections-none since late 2016    Neuralgia     Spinal stenosis     Ulcer ([address] )     Ulcer of gastric fundus     Wears glasses     Wears partial dentures     upper and lower       Past Surgical History:   Procedure Laterality Date    ABDOMINAL SURGERY      exploratory-removal of appendix    APPENDECTOMY      CARPAL TUNNEL RELEASE Bilateral     CHOLECYSTECTOMY      COLONOSCOPY      DILATION AND CURETTAGE OF UTERUS      x3 miscarriages    FOOT SURGERY Right     5th toe-hammertoe repair    HYSTERECTOMY      complete    ROTATOR CUFF REPAIR Right     TONSILLECTOMY         Meds/Allergies:    PTA meds:   Prior to Admission Medications   Prescriptions Last Dose Informant Patient Reported? Taking? NON FORMULARY   Yes No   Sig: daily at bedtime Stool softner   NON FORMULARY   Yes No   Si capsule daily at bedtime as needed Oxy powder-   POTASSIUM CHLORIDE PO   Yes No   Sig: Take by mouth every morning   Probiotic Product (PROBIOTIC DAILY PO)   Yes No   Sig: Take by mouth every morning   albuterol (ACCUNEB) 0 63 MG/3ML nebulizer solution   Yes No   Sig: Take 1 ampule by nebulization every 6 (six) hours as needed for wheezing   albuterol (PROVENTIL HFA,VENTOLIN HFA) 90 mcg/act inhaler   Yes No   Sig: Inhale 2 puffs every 6 (six) hours as needed for wheezing     aspirin 81 mg chewable tablet   Yes No   Sig: Chew 81 mg every morning     atorvastatin (LIPITOR) 20 mg tablet   Yes No   Sig: Take 20 mg by mouth every morning     baclofen 20 mg tablet   Yes No   Sig: Take 20 mg by mouth as needed     cycloSPORINE (RESTASIS) 0 05 % ophthalmic emulsion   Yes No   Sig: Administer 1 drop to both eyes 2 (two) times a day     famotidine (PEPCID) 20 mg tablet   Yes No   Sig: Take 20 mg by mouth 2 (two) times a day   levothyroxine 88 mcg tablet   Yes No   Sig: Take 88 mcg by mouth every morning   melatonin 3 mg   Yes No   Sig: Take 3 mg by mouth daily at bedtime as needed   mometasone (NASONEX) 50 mcg/act nasal spray   Yes No   Si sprays into each nostril as needed     multivitamin (THERAGRAN) TABS   Yes No   Sig: Take 1 tablet by mouth every morning      Facility-Administered Medications: None       Allergies: Allergies   Allergen Reactions    Omnipaque [Iohexol] Other (See Comments)     Shakes, "passed out"    Pneumovax 23 [Pneumococcal Vac Polyvalent] Hives    Aspirin GI Intolerance     Cannot take a dose higher than 81mg-pt has a hx of ulcer    Codeine GI Intolerance     N/V    Flexeril [Cyclobenzaprine]      Unknown reaction per pt  Allergy was noted on physicians note    Other Rash     Adhesive Tape-caused a rash       Social History:     Marital Status: /Civil Union   History   Alcohol Use No     History   Smoking Status    Never Smoker   Smokeless Tobacco    Never Used     History   Drug Use No       Family History:     Mother: Colon Cancer, CAD  Father: Alzheimer Dementia, DM    Physical Exam:     Vitals:   Blood Pressure: 125/59 (10/06/17 1942)  Pulse: (!) 114 (10/06/17 1942)  Temperature: (!) 101 5 °F (38 6 °C) (10/06/17 1942)  Temp Source: Oral (10/06/17 1942)  Respirations: 20 (10/06/17 1942)  SpO2: 94 % (10/06/17 1942)    Physical Exam:   General: in no acute distress  HEENT: atraumatic, normocephalic  Skin: no jaundice  CVS: RRR, murmur appreciated along sternal border  Musc: no evidence of joint swelling or erythema of her shoulders or knees, mild left shoulder tenderness upon palpation, limited ROM due to pain  Lungs: CTAL, no wheezing or rales appreciated  Abdomen: soft, nondistended, bowel sounds normal, mild midepigastric tenderness upon palpation, no guarding or rebound tenderness  Extremities: no edema, no calf swelling or tenderness  Neuro: alert and oriented x3, normal handgrip strength bilaterally, sensation intact in all extremities  Psych: calm, cooperative      Lab Results: I have personally reviewed pertinent reports  Results from last 7 days  Lab Units 10/06/17  1808   WBC Thousand/uL 6 60   HEMOGLOBIN g/dL 12 3   HEMATOCRIT % 37 6   PLATELETS Thousands/uL 233   NEUTROS PCT % 89*   LYMPHS PCT % 7*   MONOS PCT % 3*   EOS PCT % 1       Results from last 7 days  Lab Units 10/06/17  1808   SODIUM mmol/L 140   POTASSIUM mmol/L 3 9   CHLORIDE mmol/L 104   CO2 mmol/L 24   BUN mg/dL 13   CREATININE mg/dL 0 86   CALCIUM mg/dL 9 4   TOTAL PROTEIN g/dL 7 1   BILIRUBIN TOTAL mg/dL 0 50   ALK PHOS U/L 102   ALT U/L 29   AST U/L 24   GLUCOSE RANDOM mg/dL 100       Results from last 7 days  Lab Units 10/06/17  1808   INR  1 04       Imaging:     No results found  Assessment/Plan    Elan Martins is a 67 y o  female with a PMH of Hypothyroidism, HLD, Asthma and PUD who presents with fever, chills, and sinus congestion  Sepsis  - no clear source though symptoms can be secondary to her flu vaccination vs sinusitis/bronchitis vs UTI  Pt does report a history of mild fevers after receiving it in the past   CXR not concerning for pneumonia  U/A to be collected  LA is elevated at 3  She was given a dose of Zosyn in the ED    At this time, Ill empirically start on Levaquin which should cover both a pulmonary and urinary source, await blood cultures and U/A and trend her LA until it normalizes     Hypothyroidism  - continue Synthroid    PUD  - resume Pepcid    HLD  - continue Statin    Asthma  - continue Albuterol     Hospital Problem List:     Principal Problem:    Sepsis ([address] )  Active Problems:    Hypothyroidism    HLD (hyperlipidemia) Asthma    PUD (peptic ulcer disease)        VTE Prophylaxis: Heparin   Code Status: No Order    Anticipated Length of Stay:  Patient will be admitted on an Inpatient basis with an anticipated length of stay of atleast 2 midnights  Total Time for Visit, including Counseling / Coordination of Care: 30 minutes  Greater than 50% of this total time spent on direct patient counseling and coordination of care

## 2017-10-07 NOTE — PLAN OF CARE
Problem: DISCHARGE PLANNING - CARE MANAGEMENT  Goal: Discharge to post-acute care or home with appropriate resources  INTERVENTIONS:  - Conduct assessment to determine patient/family and health care team treatment goals, and need for post-acute services based on payer coverage, community resources, and patient preferences, and barriers to discharge  - Address psychosocial, clinical, and financial barriers to discharge as identified in assessment in conjunction with the patient/family and health care team  - Arrange appropriate level of post-acute services according to patient's   needs and preference and payer coverage in collaboration with the physician and health care team  - Communicate with and update the patient/family, physician, and health care team regarding progress on the discharge plan  - Arrange appropriate transportation to post-acute venues  HOME VS STR PENDING OUTCOME OF INPT STAY  Outcome: Progressing

## 2017-10-08 ENCOUNTER — APPOINTMENT (INPATIENT)
Dept: RADIOLOGY | Facility: HOSPITAL | Age: 72
DRG: 872 | End: 2017-10-08
Payer: MEDICARE

## 2017-10-08 ENCOUNTER — ANESTHESIA EVENT (OUTPATIENT)
Dept: PERIOP | Facility: AMBULARY SURGERY CENTER | Age: 72
End: 2017-10-08

## 2017-10-08 LAB
ANION GAP SERPL CALCULATED.3IONS-SCNC: 11 MMOL/L (ref 4–13)
BASOPHILS # BLD AUTO: 0 THOUSANDS/ΜL (ref 0–0.1)
BASOPHILS NFR BLD AUTO: 1 % (ref 0–1)
BUN SERPL-MCNC: 6 MG/DL (ref 5–25)
CALCIUM SERPL-MCNC: 8.6 MG/DL (ref 8.3–10.1)
CHLORIDE SERPL-SCNC: 107 MMOL/L (ref 100–108)
CO2 SERPL-SCNC: 24 MMOL/L (ref 21–32)
CREAT SERPL-MCNC: 0.69 MG/DL (ref 0.6–1.3)
EOSINOPHIL # BLD AUTO: 0.2 THOUSAND/ΜL (ref 0–0.61)
EOSINOPHIL NFR BLD AUTO: 4 % (ref 0–6)
ERYTHROCYTE [DISTWIDTH] IN BLOOD BY AUTOMATED COUNT: 15.3 % (ref 11.6–15.1)
GFR SERPL CREATININE-BSD FRML MDRD: 87 ML/MIN/1.73SQ M
GLUCOSE SERPL-MCNC: 101 MG/DL (ref 65–140)
HCT VFR BLD AUTO: 33.3 % (ref 37–47)
HGB BLD-MCNC: 10.9 G/DL (ref 12–16)
L PNEUMO1 AG UR QL IA.RAPID: NEGATIVE
LYMPHOCYTES # BLD AUTO: 0.9 THOUSANDS/ΜL (ref 0.6–4.47)
LYMPHOCYTES NFR BLD AUTO: 24 % (ref 14–44)
MCH RBC QN AUTO: 25.5 PG (ref 27–31)
MCHC RBC AUTO-ENTMCNC: 32.6 G/DL (ref 31.4–37.4)
MCV RBC AUTO: 78 FL (ref 82–98)
MONOCYTES # BLD AUTO: 0.5 THOUSAND/ΜL (ref 0.17–1.22)
MONOCYTES NFR BLD AUTO: 14 % (ref 4–12)
MRSA NOSE QL CULT: ABNORMAL
MRSA NOSE QL CULT: ABNORMAL
NEUTROPHILS # BLD AUTO: 2.3 THOUSANDS/ΜL (ref 1.85–7.62)
NEUTS SEG NFR BLD AUTO: 58 % (ref 43–75)
NRBC BLD AUTO-RTO: 0 /100 WBCS
PLATELET # BLD AUTO: 151 THOUSANDS/UL (ref 130–400)
PMV BLD AUTO: 7.4 FL (ref 8.9–12.7)
POTASSIUM SERPL-SCNC: 3.5 MMOL/L (ref 3.5–5.3)
RBC # BLD AUTO: 4.26 MILLION/UL (ref 4.2–5.4)
S PNEUM AG UR QL: NEGATIVE
SODIUM SERPL-SCNC: 142 MMOL/L (ref 136–145)
WBC # BLD AUTO: 3.9 THOUSAND/UL (ref 4.8–10.8)

## 2017-10-08 PROCEDURE — 80048 BASIC METABOLIC PNL TOTAL CA: CPT | Performed by: INTERNAL MEDICINE

## 2017-10-08 PROCEDURE — 94760 N-INVAS EAR/PLS OXIMETRY 1: CPT

## 2017-10-08 PROCEDURE — 74176 CT ABD & PELVIS W/O CONTRAST: CPT

## 2017-10-08 PROCEDURE — 87449 NOS EACH ORGANISM AG IA: CPT | Performed by: INTERNAL MEDICINE

## 2017-10-08 PROCEDURE — 85025 COMPLETE CBC W/AUTO DIFF WBC: CPT | Performed by: INTERNAL MEDICINE

## 2017-10-08 RX ADMIN — HEPARIN SODIUM 5000 UNITS: 5000 INJECTION, SOLUTION INTRAVENOUS; SUBCUTANEOUS at 14:28

## 2017-10-08 RX ADMIN — SODIUM CHLORIDE 75 ML/HR: 0.9 INJECTION, SOLUTION INTRAVENOUS at 04:54

## 2017-10-08 RX ADMIN — Medication 1 APPLICATION: at 21:32

## 2017-10-08 RX ADMIN — ACETAMINOPHEN 650 MG: 325 TABLET, FILM COATED ORAL at 17:53

## 2017-10-08 RX ADMIN — MELATONIN TAB 3 MG 3 MG: 3 TAB at 21:32

## 2017-10-08 RX ADMIN — FAMOTIDINE 20 MG: 20 TABLET ORAL at 08:15

## 2017-10-08 RX ADMIN — HEPARIN SODIUM 5000 UNITS: 5000 INJECTION, SOLUTION INTRAVENOUS; SUBCUTANEOUS at 21:32

## 2017-10-08 RX ADMIN — HEPARIN SODIUM 5000 UNITS: 5000 INJECTION, SOLUTION INTRAVENOUS; SUBCUTANEOUS at 05:31

## 2017-10-08 RX ADMIN — LEVOFLOXACIN 750 MG: 5 INJECTION, SOLUTION INTRAVENOUS at 21:31

## 2017-10-08 RX ADMIN — FAMOTIDINE 20 MG: 20 TABLET ORAL at 17:46

## 2017-10-08 RX ADMIN — ATORVASTATIN CALCIUM 20 MG: 20 TABLET, FILM COATED ORAL at 16:04

## 2017-10-08 RX ADMIN — LEVOTHYROXINE SODIUM 88 MCG: 88 TABLET ORAL at 05:31

## 2017-10-08 RX ADMIN — ASPIRIN 81 MG 81 MG: 81 TABLET ORAL at 08:15

## 2017-10-08 NOTE — PROGRESS NOTES
[facility] Progress Note  Patient: Lisa Rojo 67 y o  female   MRN: [id number]  PCP: Iliana Loyola  Unit/Bed#: [address] [place] [address] Encounter: [id number]  Date Of Visit: 10/08/17    Problem List:    Principal Problem:    Sepsis ([address] )  Active Problems:    Hypothyroidism    HLD (hyperlipidemia)    Asthma    PUD (peptic ulcer disease)      Assessment & Plan:    1  Sepsis likely secondary urinary tract infection and possible bronchitis-urine cultures growing gram-negative rods  Blood cultures so far are negative  Continue empiric Levaquin  Follow up final urine and blood cultures  Will also spent for sputum cultures, urine for Legionella pneumococcal antigens  2  Abdominal pain with bloating-will get a CT of the brain and pelvis with p  o  contrast   3  Hypothyroidism-continue levothyroxine 88 microgram p o  daily  4  Hyperlipidemia-continue Lipitor 20 milligram p o  daily  5  Asthma-no evidence of exacerbation  Continue albuterol p r n   6  Peptic ulcer disease-continue Pepcid      VTE Pharmacologic Prophylaxis:   Pharmacologic: Enoxaparin (Lovenox)  Mechanical VTE Prophylaxis in Place: Yes    Patient Centered Rounds: I have performed bedside rounds with nursing staff today  Discussions with Specialists or Other Care Team Provider: No    Education and Discussions with Family / Patient:Yes    Time Spent for Care: 30 minutes  More than 50% of total time spent on counseling and coordination of care as described above  Current Length of Stay: 2 day(s)    Current Patient Status: Inpatient     Discharge Plan: Home    Code Status: Level 1 - Full Code      Subjective:   Patient complains of lot of mucus production which is yellow in color  Patient also complains of abdominal pain which is diffuse and achy and sometimes just in the suprapubic region  Patient also has very poor appetite      Objective:         Negative for Chest Pain, Palpitations, Shortness of Breath, Abdominal Pain, Nausea, Vomiting, Constipation, Diarrhea, Dizziness  All other 10 review of systems negative and without drastic interval changes from yesterday  Vitals:   Temp (24hrs), Av 9 °F (37 7 °C), Min:98 3 °F (36 8 °C), Max:102 °F (38 9 °C)    HR:  [63-74] 74  Resp:  [16-20] 16  BP: (117-170)/(56-77) 140/77  SpO2:  [92 %-95 %] 95 %  Body mass index is 27 34 kg/m²  Input and Output Summary (last 24 hours): Intake/Output Summary (Last 24 hours) at 10/08/17 1517  Last data filed at 10/08/17 1230   Gross per 24 hour   Intake          2923 75 ml   Output             2800 ml   Net           123 75 ml       Physical Exam:     Physical Exam   Constitutional: No distress  HENT:   Head: Normocephalic and atraumatic  Nose: Nose normal    Eyes: Conjunctivae and EOM are normal  Pupils are equal, round, and reactive to light  Neck: Normal range of motion  Neck supple  No JVD present  Cardiovascular: Normal rate and regular rhythm  Exam reveals no gallop and no friction rub  Murmur heard  Pulmonary/Chest: Effort normal and breath sounds normal  No respiratory distress  She has no wheezes  She has no rales  She exhibits no tenderness  Abdominal: Soft  Bowel sounds are normal  She exhibits no distension  There is tenderness  There is no rebound and no guarding  Mild diffuse tenderness   Musculoskeletal: She exhibits no edema  Neurological: She is alert  No cranial nerve deficit  Skin: Skin is warm and dry  No rash noted  Psychiatric: She has a normal mood and affect         Additional Data:     Labs:      Results from last 7 days  Lab Units 10/08/17  0503   WBC Thousand/uL 3 90*   HEMOGLOBIN g/dL 10 9*   HEMATOCRIT % 33 3*   PLATELETS Thousands/uL 151   NEUTROS PCT % 58   LYMPHS PCT % 24   MONOS PCT % 14*   EOS PCT % 4       Results from last 7 days  Lab Units 10/08/17  0503  10/06/17  1808   SODIUM mmol/L 142  < > 140   POTASSIUM mmol/L 3 5  < > 3 9   CHLORIDE mmol/L 107  < > 104   CO2 mmol/L 24  < > 24   BUN mg/dL 6  < > 13   CREATININE mg/dL 0 69  < > 0 86   CALCIUM mg/dL 8 6  < > 9 4   TOTAL PROTEIN g/dL  --   --  7 1   BILIRUBIN TOTAL mg/dL  --   --  0 50   ALK PHOS U/L  --   --  102   ALT U/L  --   --  29   AST U/L  --   --  24   GLUCOSE RANDOM mg/dL 101  < > 100   < > = values in this interval not displayed  Results from last 7 days  Lab Units 10/06/17  1808   INR  1 04       * I Have Reviewed All Lab Data Listed Above  * Additional Pertinent Lab Tests Reviewed: [address] Admission Reviewed    Imaging:  Xr Chest 1 View Portable    Result Date: 10/6/2017  Narrative: CHEST INDICATION:  Fever COMPARISON:  Chest radiograph 2/14/2016 VIEWS:   AP frontal IMAGES:  1 FINDINGS:     Cardiomediastinal silhouette appears unremarkable  There is platelike atelectasis at the left lung base  No large pleural effusion or evident pneumothorax  Visualized osseous structures appear within normal limits for the patient's age  Impression: Left basilar atelectasis  Workstation performed: [id number]     Imaging Reports Reviewed by myself    Cultures:   Blood Culture:   Lab Results   Component Value Date    BLOODCX No Growth at 24 hrs  10/06/2017    BLOODCX No Growth at 24 hrs   10/06/2017     Urine Culture:   Lab Results   Component Value Date    URINECX  10/06/2017     30,000-39,000 cfu/ml Gram Negative Jag Enteric Like     Sputum Culture: No components found for: SPUTUMCX  Wound Culture: No results found for: WOUNDCULT    Last 24 Hours Medication List:     aspirin 81 mg Oral QAM   atorvastatin 20 mg Oral Daily With Dinner   famotidine 20 mg Oral BID   heparin (porcine) 5,000 Units Subcutaneous Q8H [address]   levofloxacin 750 mg Intravenous Q24H   levothyroxine 88 mcg Oral Early Morning   melatonin 3 mg Oral HS   mupirocin  Nasal Q12H [address]   sodium chloride 1,000 mL Intravenous Once   Followed by      sodium chloride 1,000 mL Intravenous Once        Today, Patient Was Seen By: Richa Vázquez MD    ** Please Note: Dragon 360 Dictation voice to text software may have been used in the creation of this document   **

## 2017-10-09 ENCOUNTER — ANESTHESIA (OUTPATIENT)
Dept: PERIOP | Facility: AMBULARY SURGERY CENTER | Age: 72
End: 2017-10-09

## 2017-10-09 LAB
ANION GAP SERPL CALCULATED.3IONS-SCNC: 9 MMOL/L (ref 4–13)
ATRIAL RATE: 117 BPM
BACTERIA UR CULT: ABNORMAL
BASOPHILS # BLD AUTO: 0 THOUSANDS/ΜL (ref 0–0.1)
BASOPHILS NFR BLD AUTO: 1 % (ref 0–1)
BUN SERPL-MCNC: 8 MG/DL (ref 5–25)
CALCIUM SERPL-MCNC: 8.7 MG/DL (ref 8.3–10.1)
CHLORIDE SERPL-SCNC: 107 MMOL/L (ref 100–108)
CO2 SERPL-SCNC: 26 MMOL/L (ref 21–32)
CREAT SERPL-MCNC: 0.63 MG/DL (ref 0.6–1.3)
EOSINOPHIL # BLD AUTO: 0.2 THOUSAND/ΜL (ref 0–0.61)
EOSINOPHIL NFR BLD AUTO: 5 % (ref 0–6)
ERYTHROCYTE [DISTWIDTH] IN BLOOD BY AUTOMATED COUNT: 15.4 % (ref 11.6–15.1)
GFR SERPL CREATININE-BSD FRML MDRD: 89 ML/MIN/1.73SQ M
GLUCOSE SERPL-MCNC: 110 MG/DL (ref 65–140)
HCT VFR BLD AUTO: 32.9 % (ref 37–47)
HGB BLD-MCNC: 11 G/DL (ref 12–16)
LYMPHOCYTES # BLD AUTO: 1.5 THOUSANDS/ΜL (ref 0.6–4.47)
LYMPHOCYTES NFR BLD AUTO: 34 % (ref 14–44)
MCH RBC QN AUTO: 25.8 PG (ref 27–31)
MCHC RBC AUTO-ENTMCNC: 33.3 G/DL (ref 31.4–37.4)
MCV RBC AUTO: 77 FL (ref 82–98)
MONOCYTES # BLD AUTO: 0.6 THOUSAND/ΜL (ref 0.17–1.22)
MONOCYTES NFR BLD AUTO: 14 % (ref 4–12)
NEUTROPHILS # BLD AUTO: 2 THOUSANDS/ΜL (ref 1.85–7.62)
NEUTS SEG NFR BLD AUTO: 46 % (ref 43–75)
NRBC BLD AUTO-RTO: 0 /100 WBCS
P AXIS: 66 DEGREES
PLATELET # BLD AUTO: 175 THOUSANDS/UL (ref 130–400)
PMV BLD AUTO: 7.7 FL (ref 8.9–12.7)
POTASSIUM SERPL-SCNC: 3.6 MMOL/L (ref 3.5–5.3)
PR INTERVAL: 178 MS
QRS AXIS: -29 DEGREES
QRSD INTERVAL: 70 MS
QT INTERVAL: 324 MS
QTC INTERVAL: 451 MS
RBC # BLD AUTO: 4.25 MILLION/UL (ref 4.2–5.4)
SODIUM SERPL-SCNC: 142 MMOL/L (ref 136–145)
T WAVE AXIS: 64 DEGREES
VENTRICULAR RATE: 117 BPM
WBC # BLD AUTO: 4.4 THOUSAND/UL (ref 4.8–10.8)

## 2017-10-09 PROCEDURE — 94760 N-INVAS EAR/PLS OXIMETRY 1: CPT

## 2017-10-09 PROCEDURE — 80048 BASIC METABOLIC PNL TOTAL CA: CPT | Performed by: INTERNAL MEDICINE

## 2017-10-09 PROCEDURE — 85025 COMPLETE CBC W/AUTO DIFF WBC: CPT | Performed by: INTERNAL MEDICINE

## 2017-10-09 RX ORDER — MAGNESIUM HYDROXIDE/ALUMINUM HYDROXICE/SIMETHICONE 120; 1200; 1200 MG/30ML; MG/30ML; MG/30ML
30 SUSPENSION ORAL EVERY 4 HOURS PRN
Status: DISCONTINUED | OUTPATIENT
Start: 2017-10-09 | End: 2017-10-11 | Stop reason: HOSPADM

## 2017-10-09 RX ORDER — SACCHAROMYCES BOULARDII 250 MG
250 CAPSULE ORAL 2 TIMES DAILY WITH MEALS
Status: DISCONTINUED | OUTPATIENT
Start: 2017-10-09 | End: 2017-10-11 | Stop reason: HOSPADM

## 2017-10-09 RX ADMIN — Medication 1 APPLICATION: at 20:34

## 2017-10-09 RX ADMIN — MELATONIN TAB 3 MG 3 MG: 3 TAB at 22:14

## 2017-10-09 RX ADMIN — LEVOTHYROXINE SODIUM 88 MCG: 88 TABLET ORAL at 05:48

## 2017-10-09 RX ADMIN — Medication 250 MG: at 16:53

## 2017-10-09 RX ADMIN — FAMOTIDINE 20 MG: 20 TABLET ORAL at 18:07

## 2017-10-09 RX ADMIN — ACETAMINOPHEN 650 MG: 325 TABLET, FILM COATED ORAL at 23:31

## 2017-10-09 RX ADMIN — HEPARIN SODIUM 5000 UNITS: 5000 INJECTION, SOLUTION INTRAVENOUS; SUBCUTANEOUS at 15:20

## 2017-10-09 RX ADMIN — CEFAZOLIN SODIUM 1000 MG: 1 SOLUTION INTRAVENOUS at 20:34

## 2017-10-09 RX ADMIN — HEPARIN SODIUM 5000 UNITS: 5000 INJECTION, SOLUTION INTRAVENOUS; SUBCUTANEOUS at 22:14

## 2017-10-09 RX ADMIN — FAMOTIDINE 20 MG: 20 TABLET ORAL at 08:01

## 2017-10-09 RX ADMIN — ASPIRIN 81 MG 81 MG: 81 TABLET ORAL at 08:00

## 2017-10-09 RX ADMIN — Medication: at 08:02

## 2017-10-09 RX ADMIN — ATORVASTATIN CALCIUM 20 MG: 20 TABLET, FILM COATED ORAL at 16:54

## 2017-10-09 NOTE — PROGRESS NOTES
[facility] Progress Note  Patient: Talib Burkett 67 y o  female   MRN: [id number]  PCP: Alfredo Wray  Unit/Bed#: [address] [place] [address] Encounter: [id number]  Date Of Visit: 10/09/17    Problem List:    Principal Problem:    Sepsis ([address] )  Active Problems:    Hypothyroidism    HLD (hyperlipidemia)    Asthma    PUD (peptic ulcer disease)      Assessment & Plan:    1  Sepsis likely secondary urinary tract infection and possible bronchitis-urine cultures growing E coli  Will change antibiotics to Ancef 1 gram IV q 8h as E coli is resistant to Levaquin  Blood cultures so far are negative  Urine for Legionella pneumococcal antigens are negative  2  Postprandial abdominal pain with bloating and poor appetite-CT scan of abdomen and pelvis was normal   GI consult appreciated  Continue PPI, Carafate and symptomatic treatment  Follow up with GI as outpatient for EGD and colonoscopy  3  Hypothyroidism-continue levothyroxine 88 microgram p o  daily  4  Hyperlipidemia-continue Lipitor 20 milligram p o  daily  5  Asthma-no evidence of exacerbation  Continue albuterol p r n   6  Peptic ulcer disease-continue Pepcid      VTE Pharmacologic Prophylaxis:   Pharmacologic: Enoxaparin (Lovenox)  Mechanical VTE Prophylaxis in Place: Yes    Patient Centered Rounds: I have performed bedside rounds with nursing staff today  Discussions with Specialists or Other Care Team Provider: No    Education and Discussions with Family / Patient:Yes    Time Spent for Care: 30 minutes  More than 50% of total time spent on counseling and coordination of care as described above  Current Length of Stay: 3 day(s)    Current Patient Status: Inpatient     Discharge Plan: Home    Code Status: Level 1 - Full Code      Subjective:   Patient reports fatigue  Patient also complains of abdominal bloating and abdominal pain after eating  Patient still has some cough without much phlegm production      Objective:         Negative for Chest Pain, Palpitations, Shortness of Breath, Abdominal Pain, Nausea, Vomiting, Constipation, Diarrhea, Dizziness  All other 10 review of systems negative and without drastic interval changes from yesterday  Vitals:   Temp (24hrs), Av °F (36 7 °C), Min:97 4 °F (36 3 °C), Max:98 5 °F (36 9 °C)    HR:  [57-76] 68  Resp:  [16-18] 18  BP: (103-156)/(59-76) 156/69  SpO2:  [92 %-98 %] 96 %  Body mass index is 27 34 kg/m²  Input and Output Summary (last 24 hours): Intake/Output Summary (Last 24 hours) at 10/09/17 1845  Last data filed at 10/09/17 0336   Gross per 24 hour   Intake                0 ml   Output             1150 ml   Net            -1150 ml       Physical Exam:     Physical Exam   Constitutional: No distress  HENT:   Head: Normocephalic and atraumatic  Nose: Nose normal    Eyes: Conjunctivae and EOM are normal  Pupils are equal, round, and reactive to light  Neck: Normal range of motion  Neck supple  No JVD present  Cardiovascular: Normal rate and regular rhythm  Exam reveals no gallop and no friction rub  Murmur heard  Pulmonary/Chest: Effort normal and breath sounds normal  No respiratory distress  She has no wheezes  She has no rales  She exhibits no tenderness  Abdominal: Soft  Bowel sounds are normal  She exhibits no distension  There is no tenderness  There is no rebound and no guarding  Musculoskeletal: She exhibits no edema  Neurological: She is alert  No cranial nerve deficit  Skin: Skin is warm and dry  No rash noted  Psychiatric: She has a normal mood and affect         Additional Data:     Labs:      Results from last 7 days  Lab Units 10/09/17  0552   WBC Thousand/uL 4 40*   HEMOGLOBIN g/dL 11 0*   HEMATOCRIT % 32 9*   PLATELETS Thousands/uL 175   NEUTROS PCT % 46   LYMPHS PCT % 34   MONOS PCT % 14*   EOS PCT % 5       Results from last 7 days  Lab Units 10/09/17  0552  10/06/17  1808   SODIUM mmol/L 142  < > 140   POTASSIUM mmol/L 3 6  < > 3 9   CHLORIDE mmol/L 107  < > 104   CO2 mmol/L 26  < > 24   BUN mg/dL 8  < > 13   CREATININE mg/dL 0 63  < > 0 86   CALCIUM mg/dL 8 7  < > 9 4   TOTAL PROTEIN g/dL  --   --  7 1   BILIRUBIN TOTAL mg/dL  --   --  0 50   ALK PHOS U/L  --   --  102   ALT U/L  --   --  29   AST U/L  --   --  24   GLUCOSE RANDOM mg/dL 110  < > 100   < > = values in this interval not displayed  Results from last 7 days  Lab Units 10/06/17  1808   INR  1 04       * I Have Reviewed All Lab Data Listed Above  * Additional Pertinent Lab Tests Reviewed: [address] Admission Reviewed    Imaging:  Xr Chest 1 View Portable    Result Date: 10/6/2017  Narrative: CHEST INDICATION:  Fever COMPARISON:  Chest radiograph 2/14/2016 VIEWS:   AP frontal IMAGES:  1 FINDINGS:     Cardiomediastinal silhouette appears unremarkable  There is platelike atelectasis at the left lung base  No large pleural effusion or evident pneumothorax  Visualized osseous structures appear within normal limits for the patient's age  Impression: Left basilar atelectasis  Workstation performed: [id number]     Imaging Reports Reviewed by myself    Cultures:   Blood Culture:   Lab Results   Component Value Date    BLOODCX No Growth at 48 hrs  10/06/2017    BLOODCX No Growth at 48 hrs   10/06/2017     Urine Culture:   Lab Results   Component Value Date    URINECX 30,000-39,000 cfu/ml Escherichia coli (A) 10/06/2017     Sputum Culture: No components found for: SPUTUMCX  Wound Culture: No results found for: WOUNDCULT    Last 24 Hours Medication List:     aspirin 81 mg Oral QAM   atorvastatin 20 mg Oral Daily With Dinner   cefazolin 1,000 mg Intravenous Q8H   famotidine 20 mg Oral BID   heparin (porcine) 5,000 Units Subcutaneous Q8H [address]   levothyroxine 88 mcg Oral Early Morning   melatonin 3 mg Oral HS   mupirocin  Nasal Q12H [address]   saccharomyces boulardii 250 mg Oral BID With Meals   sodium chloride 1,000 mL Intravenous Once   Followed by      sodium chloride 1,000 mL Intravenous Once        Today, Patient Was Seen By: Clemente Mcgill MD    ** Please Note: Dragon 360 Dictation voice to text software may have been used in the creation of this document   **

## 2017-10-09 NOTE — PLAN OF CARE
DISCHARGE PLANNING - CARE MANAGEMENT     Discharge to post-acute care or home with appropriate resources Progressing        INFECTION - ADULT     Absence or prevention of progression during hospitalization Progressing     Absence of fever/infection during neutropenic period Progressing        PAIN - ADULT     Verbalizes/displays adequate comfort level or baseline comfort level Progressing        Potential for Falls     Patient will remain free of falls Progressing        RESPIRATORY - ADULT     Achieves optimal ventilation and oxygenation Progressing        Plan of care discussed with Pt and Pt is progressing

## 2017-10-09 NOTE — ANESTHESIA PREPROCEDURE EVALUATION
Sepsis ([facility] [address] )   Hypothyroidism   HLD (hyperlipidemia)   Asthma   PUD (peptic ulcer disease)     Bursitis    Neuralgia    Cystitis chronic   GERD (gastroesophageal reflux disease)    Ulcer (HCC)    Hyperlipidemia    Anxiety    Murmur    Hypothyroid hypo   Fibromyalgia    Fibromyalgia, primary    Spinal stenosis    Constipation    Ankle fracture, right casted   Diverticulosis    Ulcer of gastric fundus    History of transfusion    Hiatal hernia    H/o Lyme disease    Lichen sclerosus of female genitalia    Arthritis    Wears glasses    Wears partial dentures upper and lower   Neck pain, chronic gets steroid injections-none since late 2016   Asthma          Review of Systems/Medical History          Cardiovascular  Hyperlipidemia,    Pulmonary  Asthma: , ,        GI/Hepatic    PUD, GERD ,  Hiatal hernia,   Comment: Hiatal hernia  PUD          Endo/Other  History of thyroid disease , hypothyroidism, Arthritis     GYN       Hematology   Musculoskeletal  Back pain ,        Neurology    Neuromuscular disease ,    Psychology   Anxiety,            Physical Exam    Airway    Mallampati score: II  TM Distance: >3 FB  Neck ROM: full     Dental       Cardiovascular  Rhythm: regular, Rate: normal,     Pulmonary  Breath sounds clear to auscultation,     Other Findings        Anesthesia Plan  ASA Score- 3       Anesthesia Type- regional and general        Induction- intravenous      Informed Consent  Anesthetic plan and risks discussed with patient

## 2017-10-09 NOTE — CASE MANAGEMENT
Initial Clinical Review    Admission: Date/Time/Statement: 10/6/17 @ 2009     Orders Placed This Encounter   Procedures    Inpatient Admission (expected length of stay for this patient is greater than two midnights)     Standing Status:   Standing     Number of Occurrences:   1     Order Specific Question:   Admitting Physician     Answer:   Katie Lockhart [[id number]]     Order Specific Question:   Level of Care     Answer:   Med Surg [[id number]]     Order Specific Question:   Estimated length of stay     Answer:   More than 2 Midnights     Order Specific Question:   Certification     Answer:   I certify that inpatient services are medically necessary for this patient for a duration of greater than two midnights  See H&P and MD Progress Notes for additional information about the patient's course of treatment  ED: Date/Time/Mode of Arrival:   ED Arrival Information     Expected Arrival Acuity Means of Arrival Escorted By Service Admission Type    - 10/6/2017 17:39 Urgent Walk-In Family Member General Medicine Urgent    Arrival Complaint    CHILLS, FEVER 103 9          Chief Complaint:   Chief Complaint   Patient presents with    Fever - 9 weeks to 74 years     pt states that she had a flu shot aroun 1300  pt states that she spiked a fever shortly after  pt presents to the ed with a temp of 103 9  oral  pt has no other complaints        History of Illness:  67 y o  female with a PMH of Hypothyroidism, HLD, Asthma and PUD who presents with fever, chills, and sinus congestion  She states that she received her flu shot around 1pm today and several hours afterwards began to have shaking chills  She checked her temperature which was 103 9F  She also noticed diffuse myalgias  Due to this she came to the ED for further evaluation  She denies any recent hospitalizations but does report that she had one day of dysuria several days ago which resolved spontaneously   She also reports being told by her physician that she had a "C diff infection" several weeks ago but did not receive antibiotics  Currently she reports mild midepigastric pain which began several minutes ago and her chronic left shoulder pain  She denies any cough but does reports some sinus congestion  She denies any chest pain, shortness of breath, diarrhea, dysuria, melena or blood in her stools  No other complaints or concerns  ED Vital Signs:   ED Triage Vitals   Temperature Pulse Respirations Blood Pressure SpO2   10/06/17 1802 10/06/17 1802 10/06/17 1802 10/06/17 1802 10/06/17 1802   (!) 103 9 °F (39 9 °C) (!) 121 18 163/73 93 %      Temp Source Heart Rate Source Patient Position - Orthostatic VS BP Location FiO2 (%)   10/06/17 1802 10/06/17 1802 10/06/17 1942 10/06/17 1802 --   Oral Monitor Sitting Right arm       Pain Score       10/06/17 1857       Worst Possible Pain        Wt Readings from Last 1 Encounters:   10/06/17 63 5 kg (140 lb)       Vital Signs (abnormal): Abnormal Labs/Diagnostic Test Results:   UA W REFLEX TO MICROSCOPIC WITH REFLEX TO CULTURE - Abnormal      Leukocytes, UA Moderate (*) Negative Final     Nitrite, UA Positive (*) Negative Final     Blood, UA Trace-Intact (*) Negative Final     WBC, UA 20-30 (*) None Seen, 0-5, 5-55, 5-65 /hpf Final      Bacteria, UA Moderate (*) None Seen, Occasional /hpf Final       LACTIC ACID 3 0 (*) 0 5 - 2 0 mmol/L Final      Narrative:      Result may be elevated if tourniquet was used during collection  LIPASE - Abnormal      Lipase 67 (*) 73 - 393 u/L Final   LACTIC ACID, PLASMA - Abnormal      LACTIC ACID 2 9 (*) 0 5 - 2 0 mmol/L Final      XR chest 1 view portable   Final Result   Left basilar atelectasis         ED Treatment:   Medication Administration from 10/06/2017 1739 to 10/06/2017 2108       Date/Time Order Dose Route Action Action by Comments     10/06/2017 1943 sodium chloride 0 9 % bolus 1,000 mL 0 mL Intravenous Stopped Talat Rodrigues, KARLIE      10/06/2017 1857 sodium chloride 0 9 % bolus 1,000 mL 1,000 mL Intravenous New Bag Bell JUSTYN KARLIE Leon      10/06/2017 1945 sodium chloride 0 9 % bolus 1,000 mL 1,000 mL Intravenous New Bag Roxanne Etienne RN      10/06/2017 1943 piperacillin-tazobactam (ZOSYN) IVPB 4 5 g 0 g Intravenous Stopped Roxanne Etienne RN      10/06/2017 1902 piperacillin-tazobactam (ZOSYN) IVPB 4 5 g 4 5 g Intravenous New Bag Bell Leon RN      10/06/2017 1910 morphine (PF) 4 mg/mL injection 4 mg 4 mg Intravenous Given Bell Leon RN      10/06/2017 2024 sodium chloride 0 9 % bolus 1,000 mL 1,000 mL Intravenous New Bag Roxanne Etienne RN      10/06/2017 2019 sodium chloride 0 9 % infusion 75 mL/hr Intravenous New Bag Rios Rodriguez RN Given in the ED     10/06/2017 2015 sodium chloride 0 9 % bolus 1,000 mL 1,000 mL Intravenous Not Given Art Hart RN Given in the ED     10/06/2017 2045 sodium chloride 0 9 % bolus 1,000 mL 1,000 mL Intravenous Not Given Art Hart RN Given in the ED     10/06/2017 2023 morphine (PF) 10 mg/mL injection 6 mg 6 mg Intravenous Given Roxanne Etinene RN           Past Medical/Surgical History:    Active Ambulatory Problems     Diagnosis Date Noted    No Active Ambulatory Problems     Resolved Ambulatory Problems     Diagnosis Date Noted    No Resolved Ambulatory Problems     Past Medical History:   Diagnosis Date    Ankle fracture, right     Anxiety     Arthritis     Asthma     Bursitis     Constipation     Cystitis     Diverticulosis     Fibromyalgia     Fibromyalgia, primary     GERD (gastroesophageal reflux disease)     H/o Lyme disease     Hiatal hernia     History of transfusion     Hyperlipidemia     Hypothyroid     Lichen sclerosus of female genitalia 2016    Murmur     Neck pain, chronic     Neuralgia     Spinal stenosis     Ulcer (HCC)     Ulcer of gastric fundus     Wears glasses     Wears partial dentures        Admitting Diagnosis: Fever [R50 9]  Sepsis ([address] ) [A41 9]    Age/Sex: 67 y o  female    PER MD  Assessment/Plan  Heriberto Pulido is a 67 y o  female with a PMH of Hypothyroidism, HLD, Asthma and PUD who presents with fever, chills, and sinus congestion  Sepsis  - no clear source though symptoms can be secondary to her flu vaccination vs sinusitis/bronchitis vs UTI  Pt does report a history of mild fevers after receiving it in the past   CXR not concerning for pneumonia  U/A to be collected  LA is elevated at 3  She was given a dose of Zosyn in the ED  At this time, Ill empirically start on Levaquin which should cover both a pulmonary and urinary source, await blood cultures and U/A and trend her LA until it normalizes   Hypothyroidism  - continue Synthroid  PUD  - resume Pepcid  HLD  - continue Statin  Asthma  - continue Albuterol   Hospital Problem List:   Principal Problem:    Sepsis ([facility] [address] )  Active Problems:    Hypothyroidism    HLD (hyperlipidemia)    Asthma    PUD (peptic ulcer disease)  Anticipated Length of Stay:  Patient will be admitted on an Inpatient basis with an anticipated length of stay of atleast 2 midnights       Admission Orders:  GMF  GMP CC PLT CK  URINE CS  MRSA CX  Scheduled Meds:   aspirin 81 mg Oral QAM   atorvastatin 20 mg Oral Daily With Dinner   famotidine 20 mg Oral BID   heparin (porcine) 5,000 Units Subcutaneous Q8H [address]   levofloxacin 750 mg Intravenous Q24H   levothyroxine 88 mcg Oral Early Morning   melatonin 3 mg Oral HS   mupirocin  Nasal Q12H [address]   sodium chloride 1,000 mL Intravenous Once   Followed by      sodium chloride 1,000 mL Intravenous Once     Continuous Infusions:    PRN Meds:   acetaminophen    albuterol

## 2017-10-09 NOTE — CONSULTS
Consultation - [facility]  Amara Evangelista 67 y o  female MRN: [id number]  Unit/Bed#: [address] [place] [address] Encounter: [id number]        Inpatient consult to gastroenterology  Consult performed by: Suze Blackwell ordered by: Heather Khanna          Reason for Consult / Principal Problem:  Generalized postprandial abdominal pain  HPI: Amara Evangelista is a 67y o  year old female with history of hypothyroidism, fibromyalgia, anxiety, cholecystectomy who was admitted 3 days ago after presenting to the ER with fever and chills which occurred a few hours after getting a flu shot  She had mentioned being told about a "c diff" infection a few weeks earlier for which she completed 10 days of an antibiotic she couldn't recall the name of  She denied any diarrhea, melena, blood or mucus in the stools  She is currently being treated for sepsis felt to be related to a urinary tract infection; she is on IV Levaquin daily at this point  Yesterday the patient was complaining of diffuse abdominal pain; achy and with bloating  She endorsed poor appetite  A CT scan of the abdomen and pelvis was ordered which showed no obvious acute process  The patient tells me that she has been experiencing these symptoms for quite some time, she is unable to tell me exactly how long, her memory of past events seems fairly poor  She says that she belches and experiences flatulence a lot, she gets generalized abdominal discomfort immediately after eating, without delay  At this moment she is not having any abdominal pain "because I am not eating "  She admits to being a fast eater  She says she does not drink caffienated or carbonated beverages      She says she was supposed to have a colonoscopy with Dr Ashok Lyles but was unable to make her appointment for that, she did see him in the office 6 months ago when she was recommended to have colonoscopy because of family history of colon cancer (patient's mother had colon CA)  She was also supposed to have upper endoscopy to evaluate longstanding GERD  At any rate, currently the patient says she is not having diarrhea, she says her bowel movements are regular and formed  No blood or mucus in the stools, no melena  Denies any trouble swallowing, or any nausea/vomiting  REVIEW OF SYSTEMS:    CONSTITUTIONAL: Denies any fever, chills, or rigors  Good appetite, and no recent weight loss  HEENT: No earache or tinnitus  Denies hearing loss or visual disturbances  CARDIOVASCULAR: No chest pain or palpitations  RESPIRATORY: Denies any cough, hemoptysis, shortness of breath or dyspnea on exertion  GASTROINTESTINAL: As noted in the History of Present Illness  GENITOURINARY: No problems with urination  Denies any hematuria or dysuria  NEUROLOGIC: No dizziness or vertigo, denies headaches  MUSCULOSKELETAL: Denies any muscle or joint pain  SKIN: Denies skin rashes or itching  ENDOCRINE: Denies excessive thirst  Denies intolerance to heat or cold  PSYCHOSOCIAL: Denies depression or anxiety  Denies any recent memory loss         Historical Information   Past Medical History:   Diagnosis Date    Ankle fracture, right     casted    Anxiety     Arthritis     Asthma     Bursitis     Constipation     Cystitis     chronic    Diverticulosis     Fibromyalgia     Fibromyalgia, primary     GERD (gastroesophageal reflux disease)     H/o Lyme disease     Hiatal hernia     History of transfusion     Hyperlipidemia     Hypothyroid     hypo    Lichen sclerosus of female genitalia 2016    Murmur     Neck pain, chronic     gets steroid injections-none since late 2016    Neuralgia     Spinal stenosis     Ulcer (HCC)     Ulcer of gastric fundus     Wears glasses     Wears partial dentures     upper and lower     Past Surgical History:   Procedure Laterality Date    ABDOMINAL SURGERY      exploratory-removal of appendix    APPENDECTOMY      CARPAL TUNNEL RELEASE Bilateral     CHOLECYSTECTOMY      COLONOSCOPY      DILATION AND CURETTAGE OF UTERUS      x3 miscarriages    FOOT SURGERY Right     5th toe-hammertoe repair    HYSTERECTOMY      complete    ROTATOR CUFF REPAIR Right     TONSILLECTOMY       Social History   History   Alcohol Use No     History   Drug Use No     History   Smoking Status    Never Smoker   Smokeless Tobacco    Never Used     Family History   Problem Relation Age of Onset    Cancer Mother      colon    Alzheimer's disease Father     Heart disease Sister      MI x4-angioplasty x4 stents    Cancer Brother      brain tumor-age 6    Cancer Brother 72     prostate       Meds/Allergies     Prescriptions Prior to Admission   Medication    albuterol (PROVENTIL HFA,VENTOLIN HFA) 90 mcg/act inhaler    aspirin 81 mg chewable tablet    atorvastatin (LIPITOR) 20 mg tablet    cycloSPORINE (RESTASIS) 0 05 % ophthalmic emulsion    famotidine (PEPCID) 20 mg tablet    levothyroxine 88 mcg tablet    melatonin 3 mg    multivitamin (THERAGRAN) TABS    NON FORMULARY    NON FORMULARY    POTASSIUM CHLORIDE PO    Probiotic Product (PROBIOTIC DAILY PO)    albuterol (ACCUNEB) 0 63 MG/3ML nebulizer solution    baclofen 20 mg tablet    mometasone (NASONEX) 50 mcg/act nasal spray     Current Facility-Administered Medications   Medication Dose Route Frequency    acetaminophen (TYLENOL) tablet 650 mg  650 mg Oral Q6H PRN    albuterol inhalation solution 0 63 mg  0 63 mg Nebulization Q6H PRN    aspirin chewable tablet 81 mg  81 mg Oral QAM    atorvastatin (LIPITOR) tablet 20 mg  20 mg Oral Daily With Dinner    famotidine (PEPCID) tablet 20 mg  20 mg Oral BID    heparin (porcine) subcutaneous injection 5,000 Units  5,000 Units Subcutaneous Q8H [facility]    levofloxacin (LEVAQUIN) IVPB (premix) 750 mg  750 mg Intravenous Q24H    levothyroxine tablet 88 mcg  88 mcg Oral Early Morning    melatonin tablet 3 mg  3 mg Oral HS    mupirocin (BACTROBAN) 2 % nasal ointment   Nasal Q12H [facility]    sodium chloride 0 9 % bolus 1,000 mL  1,000 mL Intravenous Once    Followed by    sodium chloride 0 9 % bolus 1,000 mL  1,000 mL Intravenous Once       Allergies   Allergen Reactions    Omnipaque [Iohexol] Other (See Comments)     Shakes, "passed out"    Pneumovax 23 [Pneumococcal Vac Polyvalent] Hives    Aspirin GI Intolerance     Cannot take a dose higher than 81mg-pt has a hx of ulcer    Codeine GI Intolerance     N/V    Flexeril [Cyclobenzaprine]      Unknown reaction per pt  Allergy was noted on physicians note    Other Rash     Adhesive Tape-caused a rash           Objective     Blood pressure 120/59, pulse 74, temperature 98 4 °F (36 9 °C), temperature source Oral, resp  rate 16, height 5' (1 524 m), weight 63 5 kg (140 lb), SpO2 95 %, not currently breastfeeding  Intake/Output Summary (Last 24 hours) at 10/09/17 1350  Last data filed at 10/09/17 0336   Gross per 24 hour   Intake                0 ml   Output             1450 ml   Net            -1450 ml         PHYSICAL EXAM     General Appearance:   Alert, cooperative, no distress, appears stated age    HEENT:   Normocephalic, atraumatic, anicteric      Neck:  Supple, symmetrical, trachea midline, no adenopathy;    thyroid: no enlargement/tenderness/nodules; no carotid  bruit or JVD    Lungs:   Clear to auscultation bilaterally; no rales, rhonchi or wheezing; respirations unlabored    Heart[de-identified]   S1 and S2 normal; regular rate and rhythm; no murmur, rub, or gallop     Abdomen:   Soft, non-tender, non-distended; normal bowel sounds; no masses, no organomegaly    Genitalia:   Deferred    Rectal:   Deferred    Extremities:  No cyanosis, clubbing or edema    Pulses:  2+ and symmetric all extremities    Skin:  Skin color, texture, turgor normal, no rashes or lesions    Lymph nodes:  No palpable cervical, axillary or inguinal lymphadenopathy        Lab Results:   Admission on 10/06/2017   Component Date Value    WBC 10/06/2017 6 60     RBC 10/06/2017 4 75     Hemoglobin 10/06/2017 12 3     Hematocrit 10/06/2017 37 6     MCV 10/06/2017 79*    MCH 10/06/2017 25 8*    MCHC 10/06/2017 32 6     RDW 10/06/2017 15 3*    MPV 10/06/2017 7 0*    Platelets 24/16/6440 233     nRBC 10/06/2017 0     Neutrophils Relative 10/06/2017 89*    Lymphocytes Relative 10/06/2017 7*    Monocytes Relative 10/06/2017 3*    Eosinophils Relative 10/06/2017 1     Basophils Relative 10/06/2017 0     Neutrophils Absolute 10/06/2017 5 90     Lymphocytes Absolute 10/06/2017 0 50*    Monocytes Absolute 10/06/2017 0 20     Eosinophils Absolute 10/06/2017 0 00     Basophils Absolute 10/06/2017 0 00     Protime 10/06/2017 10 9     INR 10/06/2017 1 04     PTT 10/06/2017 22*    Sodium 10/06/2017 140     Potassium 10/06/2017 3 9     Chloride 10/06/2017 104     CO2 10/06/2017 24     Anion Gap 10/06/2017 12     BUN 10/06/2017 13     Creatinine 10/06/2017 0 86     Glucose 10/06/2017 100     Calcium 10/06/2017 9 4     AST 10/06/2017 24     ALT 10/06/2017 29     Alkaline Phosphatase 10/06/2017 102     Total Protein 10/06/2017 7 1     Albumin 10/06/2017 3 7     Total Bilirubin 10/06/2017 0 50     eGFR 10/06/2017 67     Color, UA 10/06/2017 Yellow     Clarity, UA 10/06/2017 Clear     Specific Gravity, UA 10/06/2017 1 010     pH, UA 10/06/2017 6 0     Leukocytes, UA 10/06/2017 Moderate*    Nitrite, UA 10/06/2017 Positive*    Protein, UA 10/06/2017 Negative     Glucose, UA 10/06/2017 Negative     Ketones, UA 10/06/2017 Negative     Urobilinogen, UA 10/06/2017 0 2     Bilirubin, UA 10/06/2017 Negative     Blood, UA 10/06/2017 Trace-Intact*    Blood Culture 10/08/2017 No Growth at 48 hrs   Blood Culture 10/08/2017 No Growth at 48 hrs       LACTIC ACID 10/06/2017 3 0*    Lipase 10/06/2017 67*    Platelet Estimate 79/27/4198 Adequate     LACTIC ACID 10/06/2017 2 9*    LACTIC ACID 10/06/2017 2 0     Total CK 10/06/2017 77  INFLU A PCR 10/07/2017 None Detected     INFLU B PCR 10/07/2017 None Detected     RSV PCR 10/07/2017 None Detected     RBC, UA 10/06/2017 1-2*    WBC, UA 10/06/2017 20-30*    Epithelial Cells 10/06/2017 Occasional     Bacteria, UA 10/06/2017 Moderate*    OTHER OBSERVATIONS 10/06/2017 Renal Epithelial Cells Present     Urine Culture 10/09/2017 30,000-39,000 cfu/ml Escherichia coli*    Platelets 88/46/3020 202     MPV 10/06/2017 6 9*    MRSA Culture Only 10/08/2017 Methicillin Resistant Staphylococcus aureus isolated*    MRSA Culture Only 10/08/2017 Please note: This patient requires contact precautions       Sodium 10/07/2017 139     Potassium 10/07/2017 3 8     Chloride 10/07/2017 106     CO2 10/07/2017 23     Anion Gap 10/07/2017 10     BUN 10/07/2017 10     Creatinine 10/07/2017 0 63     Glucose 10/07/2017 109     Calcium 10/07/2017 8 2*    eGFR 10/07/2017 89     WBC 10/07/2017 5 00     RBC 10/07/2017 4 02*    Hemoglobin 10/07/2017 10 3*    Hematocrit 10/07/2017 31 4*    MCV 10/07/2017 78*    MCH 10/07/2017 25 7*    MCHC 10/07/2017 32 9     RDW 10/07/2017 15 4*    Platelets 82/90/6819 178     MPV 10/07/2017 6 9*    WBC 10/08/2017 3 90*    RBC 10/08/2017 4 26     Hemoglobin 10/08/2017 10 9*    Hematocrit 10/08/2017 33 3*    MCV 10/08/2017 78*    MCH 10/08/2017 25 5*    MCHC 10/08/2017 32 6     RDW 10/08/2017 15 3*    MPV 10/08/2017 7 4*    Platelets 85/47/6138 151     nRBC 10/08/2017 0     Neutrophils Relative 10/08/2017 58     Lymphocytes Relative 10/08/2017 24     Monocytes Relative 10/08/2017 14*    Eosinophils Relative 10/08/2017 4     Basophils Relative 10/08/2017 1     Neutrophils Absolute 10/08/2017 2 30     Lymphocytes Absolute 10/08/2017 0 90     Monocytes Absolute 10/08/2017 0 50     Eosinophils Absolute 10/08/2017 0 20     Basophils Absolute 10/08/2017 0 00     Sodium 10/08/2017 142     Potassium 10/08/2017 3 5     Chloride 10/08/2017 107     CO2 10/08/2017 24     Anion Gap 10/08/2017 11     BUN 10/08/2017 6     Creatinine 10/08/2017 0 69     Glucose 10/08/2017 101     Calcium 10/08/2017 8 6     eGFR 10/08/2017 87     Strep pneumoniae antigen* 10/08/2017 Negative     Legionella Urinary Antig* 10/08/2017 Negative     WBC 10/09/2017 4 40*    RBC 10/09/2017 4 25     Hemoglobin 10/09/2017 11 0*    Hematocrit 10/09/2017 32 9*    MCV 10/09/2017 77*    MCH 10/09/2017 25 8*    MCHC 10/09/2017 33 3     RDW 10/09/2017 15 4*    MPV 10/09/2017 7 7*    Platelets 43/90/9938 175     nRBC 10/09/2017 0     Neutrophils Relative 10/09/2017 46     Lymphocytes Relative 10/09/2017 34     Monocytes Relative 10/09/2017 14*    Eosinophils Relative 10/09/2017 5     Basophils Relative 10/09/2017 1     Neutrophils Absolute 10/09/2017 2 00     Lymphocytes Absolute 10/09/2017 1 50     Monocytes Absolute 10/09/2017 0 60     Eosinophils Absolute 10/09/2017 0 20     Basophils Absolute 10/09/2017 0 00     Sodium 10/09/2017 142     Potassium 10/09/2017 3 6     Chloride 10/09/2017 107     CO2 10/09/2017 26     Anion Gap 10/09/2017 9     BUN 10/09/2017 8     Creatinine 10/09/2017 0 63     Glucose 10/09/2017 110     Calcium 10/09/2017 8 7     eGFR 10/09/2017 89      Results for [facility] (MRN [id number]) as of 10/9/2017 13:47   Ref   Range 9/22/2017 09:45 9/22/2017 09:54 10/6/2017 18:08 10/6/2017 18:08 10/6/2017 20:10 10/6/2017 20:27 10/6/2017 20:47 10/6/2017 21:31 10/6/2017 22:25 10/6/2017 22:26 10/7/2017 05:35 10/8/2017 05:03 10/8/2017 16:06   eGFR Latest Units: ml/min/1 73sq m 88  67        89 87    Sodium Latest Ref Range: 136 - 145 mmol/L 141  140        139 142    Potassium Latest Ref Range: 3 5 - 5 3 mmol/L 3 9  3 9        3 8 3 5    Chloride Latest Ref Range: 100 - 108 mmol/L 105  104        106 107    CO2 Latest Ref Range: 21 - 32 mmol/L 27  24        23 24    Anion Gap Latest Ref Range: 4 - 13 mmol/L 9  12        10 11    BUN Latest Ref Range: 5 - 25 mg/dL 15  13        10 6    Creatinine Latest Ref Range: 0 60 - 1 30 mg/dL 0 66  0 86        0 63 0 69    Glucose Latest Ref Range: 65 - 140 mg/dL   100        109 101    GLUCOSE FASTING Latest Ref Range: 65 - 99 mg/dL 88               Calcium Latest Ref Range: 8 3 - 10 1 mg/dL 9 1  9 4        8 2 (L) 8 6    AST Latest Ref Range: 5 - 45 U/L   24             ALT Latest Ref Range: 12 - 78 U/L   29             Alkaline Phosphatase Latest Ref Range: 46 - 116 U/L   102             Total Protein Latest Ref Range: 6 4 - 8 2 g/dL   7 1             Albumin Latest Ref Range: 3 5 - 5 0 g/dL   3 7             Total Bilirubin Latest Ref Range: 0 20 - 1 00 mg/dL   0 50             Lipase Latest Ref Range: 73 - 393 u/L   67 (L)             Total CK Latest Ref Range: 26 - 192 U/L   77             LACTIC ACID Latest Ref Range: 0 5 - 2 0 mmol/L   3 0 (HH)  2 9 (HH)    2 0       WBC Latest Ref Range: 4 80 - 10 80 Thousand/uL 5 90  6 60        5 00 3 90 (L)    RBC Latest Ref Range: 4 20 - 5 40 Million/uL 5 01  4 75        4 02 (L) 4 26    Hemoglobin Latest Ref Range: 12 0 - 16 0 g/dL 12 8  12 3        10 3 (L) 10 9 (L)    Hematocrit Latest Ref Range: 37 0 - 47 0 % 39 0  37 6        31 4 (L) 33 3 (L)    MCV Latest Ref Range: 82 - 98 fL 78 (L)  79 (L)        78 (L) 78 (L)    MCH Latest Ref Range: 27 0 - 31 0 pg 25 6 (L)  25 8 (L)        25 7 (L) 25 5 (L)    MCHC Latest Ref Range: 31 4 - 37 4 g/dL 32 9  32 6        32 9 32 6    RDW Latest Ref Range: 11 6 - 15 1 % 15 3 (H)  15 3 (H)        15 4 (H) 15 3 (H)    Platelets Latest Ref Range: 130 - 400 Thousands/uL 298  233       202 178 151    MPV Latest Ref Range: 8 9 - 12 7 fL 7 4 (L)  7 0 (L)       6 9 (L) 6 9 (L) 7 4 (L)    Platelet Estimate Latest Ref Range: Adequate    Adequate             nRBC Latest Units: /100 WBCs 0  0         0    Neutrophils Relative Latest Ref Range: 43 - 75 % 56  89 (H)         58    Lymphocytes Relative Latest Ref Range: 14 - 44 % 32  7 (L) 24    Monocytes Relative Latest Ref Range: 4 - 12 % 8  3 (L)         14 (H)    Eosinophils Relative Latest Ref Range: 0 - 6 % 4  1         4    Basophils Relative Latest Ref Range: 0 - 1 % 0  0         1    Neutrophils Absolute Latest Ref Range: 1 85 - 7 62 Thousands/µL 3 30  5 90         2 30    Lymphocytes Absolute Latest Ref Range: 0 60 - 4 47 Thousands/µL 1 90  0 50 (L)         0 90    Monocytes Absolute Latest Ref Range: 0 17 - 1 22 Thousand/µL 0 50  0 20         0 50    Eosinophils Absolute Latest Ref Range: 0 00 - 0 61 Thousand/µL 0 20  0 00         0 20    Basophils Absolute Latest Ref Range: 0 00 - 0 10 Thousands/µL 0 00  0 00         0 00    Protime Latest Ref Range: 9 4 - 11 7 seconds 10 7  10 9             INR Latest Ref Range: 0 86 - 1 16  1 02  1 04             PTT Latest Ref Range: 23 - 35 seconds 25  22 (L)             INFLU A PCR Latest Ref Range: None Detected       None Detected          INFLU B PCR Latest Ref Range: None Detected       None Detected          RSV PCR Latest Ref Range: None Detected       None Detected          Epithelial Cells Latest Ref Range: None Seen, Occasional /hpf       Occasional         Color, UA Unknown       Yellow         Clarity, UA Unknown       Clear         Specific [employer], UA Latest Ref Range: 1 000 - 1 030        1 010         Glucose, UA Latest Ref Range: Negative mg/dl       Negative         Ketones, UA Latest Ref Range: Negative mg/dl       Negative         Blood, UA Latest Ref Range: Negative        Trace-Intact (A)         Nitrite, UA Latest Ref Range: Negative        Positive (A)         Leukocytes, UA Latest Ref Range: Negative        Moderate (A)         pH, UA Latest Ref Range: 5 0 - 9 0        6 0         Protein, UA Latest Ref Range: Negative mg/dl       Negative         Bilirubin, UA Latest Ref Range: Negative        Negative         Urobilinogen, UA Latest Ref Range: 0 2, 1 0 E U /dl E U /dl       0 2         RBC, UA Latest Ref Range: None Seen, 0-5 /hpf       1-2 (A)         WBC, UA Latest Ref Range: None Seen, 0-5, 5-55, 5-65 /hpf       20-30 (A)         Bacteria, UA Latest Ref Range: None Seen, Occasional /hpf       Moderate (A)         OTHER OBSERVATIONS Unknown       Renal Epithelial   Legionella Urinary Antigen Latest Ref Range: Negative              Negative   BLOOD CULTURE Unknown   Rpt Rpt            MRSA CULTURE Unknown        Rpt (A)        Strep pneumoniae antigen, urine Latest Ref Range: Negative              Negative   URINE CULTURE Unknown       Rpt (A)         ECG 12-LEAD Unknown  Rpt              INFLUENZA A/B AND RSV, PCR Unknown      Rpt          STREP PNEUMONIAE ANTIGEN,URINE Unknown             Rpt       Imaging Studies: I have personally reviewed pertinent reports  Study Result     CT ABDOMEN AND PELVIS WITHOUT IV CONTRAST  INDICATION:  Abdominal pain and fever  COMPARISON: 2/11/2014  TECHNIQUE:  CT examination of the abdomen and pelvis was performed without intravenous contrast   Reformatted images were created in axial, sagittal, and coronal planes  Radiation dose length product (DLP) for this visit:  524 76 mGy-cm   This examination, like all CT scans performed in the [facility], was performed utilizing techniques to minimize radiation dose exposure, including the use of iterative   reconstruction and automated exposure control  Enteric contrast was administered  FINDINGS:  ABDOMEN  LOWER CHEST:  Bibasilar atelectasis is present  LIVER/BILIARY TREE:  Unremarkable  GALLBLADDER:  Gallbladder is surgically absent  SPLEEN:  Unremarkable  PANCREAS:  Unremarkable  ADRENAL GLANDS:  Unremarkable  KIDNEYS/URETERS:  Left extrarenal pelvis is present  No calculi or hydronephrosi  STOMACH AND BOWEL:  There is colonic diverticulosis without evidence of acute diverticulitis  APPENDIX:  No findings to suggest appendicitis  ABDOMINOPELVIC CAVITY:  No ascites or free intraperitoneal air   No lymphadenopathy  VESSELS:  Unremarkable for patient's age  PELVIS  REPRODUCTIVE ORGANS:  Patient is status post hysterectomy  URINARY BLADDER:  Unremarkable  ABDOMINAL WALL/INGUINAL REGIONS:  Unremarkable  OSSEOUS STRUCTURES:  No acute fracture or destructive osseous lesion  IMPRESSION:  Diverticulosis without active diverticulitis  No acute abnormality within the abdomen or pelvis  ASSESSMENT/PLAN:     #1   Diffuse abdominal pain and bloating; CT scan yesterday showed no acute process  This appears to represent longstanding dyspepsia which patient noticed worsened over the last couple of days, in the setting of urinary tract infection which has necessitated antibiotic therapy  Also should rule out underlying gastritis or peptic ulcer disease  No evidence of any biliary obstruction at this time  - continue with Pepcid twice daily  - recommend trial of lactose restriction; otherwise diet as tolerated  - will also add a probiotic, start Florastor  - advised patient to avoid gas-producing foods, caffeinated beverages  - recommend EGD for further evaluation; can do as inpatient depending on patient's clinical course; will discuss with attending      #2  Given history of c diff infection; patient says she completed ABX a few weeks ago and is now no longer experiencing diarrhea  #3  Sepsis and urinary tract infection      #4  History of cholecystectomy  No evidence of biliary obstruction on labs, physical exam and imaging  #5  Hypothyroidism  #6   Family history of colon cancer; patient did not follow up for colonoscopy after office visit 6 months ago  Rule out polyps or malignancy    - plan for colonoscopy which can be done as outpatient    The patient was seen and examined by Dr Josiah Higginbotham, all garcia medical decisions were made with Dr Josiah Higginbotham  Thank you for allowing us to participate in the care of this pleasant patient  We will follow up with you closely

## 2017-10-10 LAB
ANION GAP SERPL CALCULATED.3IONS-SCNC: 12 MMOL/L (ref 4–13)
BASOPHILS # BLD AUTO: 0 THOUSANDS/ΜL (ref 0–0.1)
BASOPHILS NFR BLD AUTO: 1 % (ref 0–1)
BUN SERPL-MCNC: 10 MG/DL (ref 5–25)
CALCIUM SERPL-MCNC: 8.8 MG/DL (ref 8.3–10.1)
CHLORIDE SERPL-SCNC: 106 MMOL/L (ref 100–108)
CO2 SERPL-SCNC: 25 MMOL/L (ref 21–32)
CREAT SERPL-MCNC: 0.7 MG/DL (ref 0.6–1.3)
EOSINOPHIL # BLD AUTO: 0.3 THOUSAND/ΜL (ref 0–0.61)
EOSINOPHIL NFR BLD AUTO: 5 % (ref 0–6)
ERYTHROCYTE [DISTWIDTH] IN BLOOD BY AUTOMATED COUNT: 15.4 % (ref 11.6–15.1)
GFR SERPL CREATININE-BSD FRML MDRD: 86 ML/MIN/1.73SQ M
GLUCOSE SERPL-MCNC: 102 MG/DL (ref 65–140)
HCT VFR BLD AUTO: 34.8 % (ref 37–47)
HGB BLD-MCNC: 11.2 G/DL (ref 12–16)
LYMPHOCYTES # BLD AUTO: 2.2 THOUSANDS/ΜL (ref 0.6–4.47)
LYMPHOCYTES NFR BLD AUTO: 44 % (ref 14–44)
MCH RBC QN AUTO: 25.3 PG (ref 27–31)
MCHC RBC AUTO-ENTMCNC: 32.3 G/DL (ref 31.4–37.4)
MCV RBC AUTO: 78 FL (ref 82–98)
MONOCYTES # BLD AUTO: 0.6 THOUSAND/ΜL (ref 0.17–1.22)
MONOCYTES NFR BLD AUTO: 11 % (ref 4–12)
NEUTROPHILS # BLD AUTO: 2 THOUSANDS/ΜL (ref 1.85–7.62)
NEUTS SEG NFR BLD AUTO: 39 % (ref 43–75)
NRBC BLD AUTO-RTO: 0 /100 WBCS
PLATELET # BLD AUTO: 222 THOUSANDS/UL (ref 130–400)
PMV BLD AUTO: 7.4 FL (ref 8.9–12.7)
POTASSIUM SERPL-SCNC: 3.4 MMOL/L (ref 3.5–5.3)
RBC # BLD AUTO: 4.44 MILLION/UL (ref 4.2–5.4)
SODIUM SERPL-SCNC: 143 MMOL/L (ref 136–145)
WBC # BLD AUTO: 5.1 THOUSAND/UL (ref 4.8–10.8)

## 2017-10-10 PROCEDURE — 80048 BASIC METABOLIC PNL TOTAL CA: CPT | Performed by: INTERNAL MEDICINE

## 2017-10-10 PROCEDURE — 94760 N-INVAS EAR/PLS OXIMETRY 1: CPT

## 2017-10-10 PROCEDURE — 85025 COMPLETE CBC W/AUTO DIFF WBC: CPT | Performed by: INTERNAL MEDICINE

## 2017-10-10 RX ORDER — SUCRALFATE ORAL 1 G/10ML
1000 SUSPENSION ORAL EVERY 6 HOURS SCHEDULED
Status: DISCONTINUED | OUTPATIENT
Start: 2017-10-10 | End: 2017-10-11 | Stop reason: HOSPADM

## 2017-10-10 RX ORDER — POTASSIUM CHLORIDE 20 MEQ/1
40 TABLET, EXTENDED RELEASE ORAL ONCE
Status: COMPLETED | OUTPATIENT
Start: 2017-10-10 | End: 2017-10-10

## 2017-10-10 RX ADMIN — Medication 250 MG: at 16:16

## 2017-10-10 RX ADMIN — HEPARIN SODIUM 5000 UNITS: 5000 INJECTION, SOLUTION INTRAVENOUS; SUBCUTANEOUS at 05:45

## 2017-10-10 RX ADMIN — LEVOTHYROXINE SODIUM 88 MCG: 88 TABLET ORAL at 05:45

## 2017-10-10 RX ADMIN — ATORVASTATIN CALCIUM 20 MG: 20 TABLET, FILM COATED ORAL at 16:16

## 2017-10-10 RX ADMIN — CEFAZOLIN SODIUM 1000 MG: 1 SOLUTION INTRAVENOUS at 04:06

## 2017-10-10 RX ADMIN — Medication 1 APPLICATION: at 20:18

## 2017-10-10 RX ADMIN — POTASSIUM CHLORIDE 40 MEQ: 1500 TABLET, EXTENDED RELEASE ORAL at 19:06

## 2017-10-10 RX ADMIN — HEPARIN SODIUM 5000 UNITS: 5000 INJECTION, SOLUTION INTRAVENOUS; SUBCUTANEOUS at 14:33

## 2017-10-10 RX ADMIN — SUCRALFATE 1000 MG: 1 SUSPENSION ORAL at 19:06

## 2017-10-10 RX ADMIN — CEFAZOLIN SODIUM 1000 MG: 1 SOLUTION INTRAVENOUS at 20:14

## 2017-10-10 RX ADMIN — FAMOTIDINE 20 MG: 20 TABLET ORAL at 08:03

## 2017-10-10 RX ADMIN — HEPARIN SODIUM 5000 UNITS: 5000 INJECTION, SOLUTION INTRAVENOUS; SUBCUTANEOUS at 22:17

## 2017-10-10 RX ADMIN — MELATONIN TAB 3 MG 3 MG: 3 TAB at 22:17

## 2017-10-10 RX ADMIN — Medication 250 MG: at 07:48

## 2017-10-10 RX ADMIN — Medication: at 08:04

## 2017-10-10 RX ADMIN — ACETAMINOPHEN 650 MG: 325 TABLET, FILM COATED ORAL at 11:40

## 2017-10-10 RX ADMIN — ASPIRIN 81 MG 81 MG: 81 TABLET ORAL at 08:01

## 2017-10-10 RX ADMIN — CEFAZOLIN SODIUM 1000 MG: 1 SOLUTION INTRAVENOUS at 11:45

## 2017-10-10 RX ADMIN — FAMOTIDINE 20 MG: 20 TABLET ORAL at 19:06

## 2017-10-10 NOTE — PLAN OF CARE
DISCHARGE PLANNING - CARE MANAGEMENT     Discharge to post-acute care or home with appropriate resources Progressing        INFECTION - ADULT     Absence or prevention of progression during hospitalization Progressing     Absence of fever/infection during neutropenic period Progressing        PAIN - ADULT     Verbalizes/displays adequate comfort level or baseline comfort level Progressing        Potential for Falls     Patient will remain free of falls Progressing        RESPIRATORY - ADULT     Achieves optimal ventilation and oxygenation Progressing

## 2017-10-10 NOTE — PHYSICIAN ADVISOR
Current patient class: Inpatient  The patient is currently on Hospital Day: 4      The patient was admitted to the hospital at 2009 on 10/6/17 for the following diagnosis:  Fever [R50 9]  Sepsis ([address] ) [A41 9]       There is documentation in the medical record of an expected length of stay of at least 2 midnights  The patient is therefore expected to satisfy the 2 midnight benchmark and given the 2 midnight presumption is appropriate for INPATIENT ADMISSION  Given this expectation of a satisfying stay, CMS instructs us that the patient is most often appropriate for inpatient admission under part A provided medical necessity is documented in the chart  After review of the relevant documentation, labs, vital signs and test results, the patient is appropriate for INPATIENT ADMISSION  Admission to the hospital as an inpatient is a complex decision making process which requires the practitioner to consider the patients presenting complaint, history and physical examination and all relevant testing  With this in mind, in this case, the patient was deemed appropriate for INPATIENT ADMISSION  After review of the documentation and testing available at the time of the admission I concur with this clinical determination of medical necessity  Rationale is as follows: The patient is a 67 yrs old Female who presented to the ED at 10/6/2017  6:04 PM with a chief complaint of Fever - 9 weeks to 74 years (pt states that she had a flu shot aroun 1300  pt states that she spiked a fever shortly after   pt presents to the ed with a temp of 103 9  oral  pt has no other complaints )    The patients vitals on arrival were ED Triage Vitals   Temperature Pulse Respirations Blood Pressure SpO2   10/06/17 1802 10/06/17 1802 10/06/17 1802 10/06/17 1802 10/06/17 1802   (!) 103 9 °F (39 9 °C) (!) 121 18 163/73 93 %      Temp Source Heart Rate Source Patient Position - Orthostatic VS BP Location FiO2 (%)   10/06/17 1802 10/06/17 [phone] 10/06/17 1942 10/06/17 1802 --   Oral Monitor Sitting Right arm       Pain Score       10/06/17 1857       Worst Possible Pain           Past Medical History:   Diagnosis Date    Ankle fracture, right     casted    Anxiety     Arthritis     Asthma     Bursitis     Constipation     Cystitis     chronic    Diverticulosis     Fibromyalgia     Fibromyalgia, primary     GERD (gastroesophageal reflux disease)     H/o Lyme disease     Hiatal hernia     History of transfusion     Hyperlipidemia     Hypothyroid     hypo    Lichen sclerosus of female genitalia 2016    Murmur     Neck pain, chronic     gets steroid injections-none since late 2016    Neuralgia     Spinal stenosis     Ulcer ([address] )     Ulcer of gastric fundus     Wears glasses     Wears partial dentures     upper and lower     Past Surgical History:   Procedure Laterality Date    ABDOMINAL SURGERY      exploratory-removal of appendix    APPENDECTOMY      CARPAL TUNNEL RELEASE Bilateral     CHOLECYSTECTOMY      COLONOSCOPY      DILATION AND CURETTAGE OF UTERUS      x3 miscarriages    FOOT SURGERY Right     5th toe-hammertoe repair    HYSTERECTOMY      complete    ROTATOR CUFF REPAIR Right     TONSILLECTOMY             Consults have been placed to:   IP CONSULT TO GASTROENTEROLOGY    Vitals:    10/09/17 1109 10/09/17 1553 10/09/17 2012 10/09/17 2334   BP: 120/59 156/69 138/70 141/65   Pulse: 74 68 66 73   Resp: 16 18 18 18   Temp: 98 4 °F (36 9 °C)  98 7 °F (37 1 °C) 98 °F (36 7 °C)   TempSrc: Oral   Oral   SpO2: 95% 96% 93% 94%   Weight:       Height:           Most recent labs:    Recent Labs      10/09/17   0552   WBC  4 40*   HGB  11 0*   HCT  32 9*   PLT  175   K  3 6   NA  142   CALCIUM  8 7   BUN  8   CREATININE  0 63       Scheduled Meds:  aspirin 81 mg Oral QAM   atorvastatin 20 mg Oral Daily With Dinner   cefazolin 1,000 mg Intravenous Q8H   famotidine 20 mg Oral BID   heparin (porcine) 5,000 Units Subcutaneous Q8H [address]   levothyroxine 88 mcg Oral Early Morning   melatonin 3 mg Oral HS   mupirocin  Nasal Q12H [address]   saccharomyces boulardii 250 mg Oral BID With Meals   sodium chloride 1,000 mL Intravenous Once   Followed by      sodium chloride 1,000 mL Intravenous Once     Continuous Infusions:   PRN Meds:   acetaminophen    albuterol    aluminum-magnesium hydroxide-simethicone    Surgical procedures (if appropriate):  Procedure(s):  SHOULDER ARTHROSCOPY WITH SUBACROMIAL DECOMPRESSION, POSS   ROTATOR CUFF REPAIR

## 2017-10-10 NOTE — PLAN OF CARE

## 2017-10-10 NOTE — CASE MANAGEMENT
Continued Stay Review    Date: 10/10/17    Vital Signs: /97   Pulse 77   Temp 98 3 °F (36 8 °C) (Oral)   Resp 16   Ht 5' (1 524 m)   Wt 63 5 kg (140 lb)   SpO2 95%   Breastfeeding? No   BMI 27 34 kg/m²     Medications:   Scheduled Meds:   aspirin 81 mg Oral QAM   atorvastatin 20 mg Oral Daily With Dinner   cefazolin 1,000 mg Intravenous Q8H   famotidine 20 mg Oral BID   heparin (porcine) 5,000 Units Subcutaneous Q8H [address]   levothyroxine 88 mcg Oral Early Morning   melatonin 3 mg Oral HS   mupirocin  Nasal Q12H [address]   saccharomyces boulardii 250 mg Oral BID With Meals   sodium chloride 1,000 mL Intravenous Once   Followed by      sodium chloride 1,000 mL Intravenous Once     Continuous Infusions:    PRN Meds:   acetaminophen    albuterol    aluminum-magnesium hydroxide-simethicone    Abnormal Labs/Diagnostic Results:     Age/Sex: 67 y o  female     Assessment/Plan:   Assessment & Plan:     1  Sepsis likely secondary urinary tract infection and possible bronchitis-urine cultures growing E coli  Will change antibiotics to Ancef 1 gram IV q 8h as E coli is resistant to Levaquin  Blood cultures so far are negative  Urine for Legionella pneumococcal antigens are negative  2  Postprandial abdominal pain with bloating and poor appetite-CT scan of abdomen and pelvis was normal   GI consult appreciated  Continue PPI, Carafate and symptomatic treatment  Follow up with GI as outpatient for EGD and colonoscopy  3  Hypothyroidism-continue levothyroxine 88 microgram p o  daily  4  Hyperlipidemia-continue Lipitor 20 milligram p o  daily  5  Asthma-no evidence of exacerbation  Continue albuterol p r n   6  Peptic ulcer disease-continue Pepcid  Patient reports fatigue  Patient also complains of abdominal bloating and abdominal pain after eating  Patient still has some cough without much phlegm production

## 2017-10-10 NOTE — PROGRESS NOTES
[facility] Progress Note  Patient: Eun Freeman 67 y o  female   MRN: [id number]  PCP: Jorje Sanchez  Unit/Bed#: [address] [place] [address] Encounter: [id number]  Date Of Visit: 10/10/17    Problem List:    Principal Problem:    Sepsis ([address] )  Active Problems:    Hypothyroidism    HLD (hyperlipidemia)    Asthma    PUD (peptic ulcer disease)      Assessment & Plan:    1  Sepsis - likely due to UTI and possible bronchitis  Blood cultures negative so far  No leukocytosis on lab work  2  E coli UTI without hematuria - she was previously on Levaquin but then changed to Ancef based on sensitivity of urine culture  3  Bronchitis - continue Abx as noted above  Urine Legionella pneumococcal antigen negative  4  Postprandial abdominal pain - chronic in nature  CT scan of the abdomen/pelvis was normal   Appreciate GI input  Patient will get EGD and colonoscopy as outpatient  Continue pepcid  Start carafate as per GI recommendations  5  Hypokalemia - repleted  6  Hypothyroidism - continue levothyroxine  Check TSH level in the AM      VTE Pharmacologic Prophylaxis:   Pharmacologic: Heparin  Mechanical VTE Prophylaxis in Place: No    Patient Centered Rounds: I have performed bedside rounds with nursing staff today  Discussions with Specialists or Other Care Team Provider: Yes    Education and Discussions with Family / Patient:Yes    Time Spent for Care: 35 min  More than 50% of total time spent on counseling and coordination of care as described above  Current Length of Stay: 4 day(s)    Current Patient Status: Inpatient     Discharge Plan: home    Code Status: Level 1 - Full Code      Subjective:   Complains of abdominal pain after eating  Tolerating po intake   Denies nausea, vomiting    Objective:     Vitals:   Temp (24hrs), Av 3 °F (36 8 °C), Min:98 °F (36 7 °C), Max:98 7 °F (37 1 °C)    HR:  [66-85] 85  Resp:  [16-18] 18  BP: (116-151)/(65-97) 116/68  SpO2:  [93 %-96 %] 94 %  Body mass index is 27 34 kg/m²  Input and Output Summary (last 24 hours): Intake/Output Summary (Last 24 hours) at 10/10/17 1839  Last data filed at 10/10/17 0753   Gross per 24 hour   Intake                0 ml   Output              675 ml   Net             -675 ml       Physical Exam:     Physical Exam   Constitutional: She is oriented to person, place, and time  She appears well-developed and well-nourished  No distress  HENT:   Head: Normocephalic and atraumatic  Eyes: EOM are normal  Right eye exhibits no discharge  Left eye exhibits no discharge  No scleral icterus  Neck: Neck supple  No tracheal deviation present  Cardiovascular: Normal rate and regular rhythm  Murmur (RYAN) heard  Pulmonary/Chest: Effort normal and breath sounds normal  No respiratory distress  She has no wheezes  She has no rales  Abdominal: Soft  Bowel sounds are normal  She exhibits no distension  There is no tenderness  Musculoskeletal: She exhibits no edema  Neurological: She is alert and oriented to person, place, and time  No cranial nerve deficit  Skin: Skin is dry  She is not diaphoretic  Psychiatric: She has a normal mood and affect  Additional Data:     Labs:      Results from last 7 days  Lab Units 10/10/17  0450   WBC Thousand/uL 5 10   HEMOGLOBIN g/dL 11 2*   HEMATOCRIT % 34 8*   PLATELETS Thousands/uL 222   NEUTROS PCT % 39*   LYMPHS PCT % 44   MONOS PCT % 11   EOS PCT % 5       Results from last 7 days  Lab Units 10/10/17  0450  10/06/17  1808   SODIUM mmol/L 143  < > 140   POTASSIUM mmol/L 3 4*  < > 3 9   CHLORIDE mmol/L 106  < > 104   CO2 mmol/L 25  < > 24   BUN mg/dL 10  < > 13   CREATININE mg/dL 0 70  < > 0 86   CALCIUM mg/dL 8 8  < > 9 4   TOTAL PROTEIN g/dL  --   --  7 1   BILIRUBIN TOTAL mg/dL  --   --  0 50   ALK PHOS U/L  --   --  102   ALT U/L  --   --  29   AST U/L  --   --  24   GLUCOSE RANDOM mg/dL 102  < > 100   < > = values in this interval not displayed      Results from last 7 days  Lab Units 10/06/17  1808   INR  1 04       * I Have Reviewed All Lab Data Listed Above  * Additional Pertinent Lab Tests Reviewed: [facility] Admission Reviewed    Imaging:  Ct Abdomen Pelvis Wo Contrast    Result Date: 10/9/2017  Narrative: CT ABDOMEN AND PELVIS WITHOUT IV CONTRAST INDICATION:  Abdominal pain and fever  COMPARISON: 2/11/2014  TECHNIQUE:  CT examination of the abdomen and pelvis was performed without intravenous contrast   Reformatted images were created in axial, sagittal, and coronal planes  Radiation dose length product (DLP) for this visit:  524 76 mGy-cm   This examination, like all CT scans performed in the [facility], was performed utilizing techniques to minimize radiation dose exposure, including the use of iterative  reconstruction and automated exposure control  Enteric contrast was administered  FINDINGS: ABDOMEN LOWER CHEST:  Bibasilar atelectasis is present  LIVER/BILIARY TREE:  Unremarkable  GALLBLADDER:  Gallbladder is surgically absent  SPLEEN:  Unremarkable  PANCREAS:  Unremarkable  ADRENAL GLANDS:  Unremarkable  KIDNEYS/URETERS:  Left extrarenal pelvis is present  No calculi or hydronephrosis  STOMACH AND BOWEL:  There is colonic diverticulosis without evidence of acute diverticulitis  APPENDIX:  No findings to suggest appendicitis  ABDOMINOPELVIC CAVITY:  No ascites or free intraperitoneal air  No lymphadenopathy  VESSELS:  Unremarkable for patient's age  PELVIS REPRODUCTIVE ORGANS:  Patient is status post hysterectomy  URINARY BLADDER:  Unremarkable  ABDOMINAL WALL/INGUINAL REGIONS:  Unremarkable  OSSEOUS STRUCTURES:  No acute fracture or destructive osseous lesion  Impression: Diverticulosis without active diverticulitis  No acute abnormality within the abdomen or pelvis   Workstation performed: [id number]     Xr Chest 1 View Portable    Result Date: 10/6/2017  Narrative: CHEST INDICATION:  Fever COMPARISON:  Chest radiograph 2/14/2016 VIEWS: AP frontal IMAGES:  1 FINDINGS:     Cardiomediastinal silhouette appears unremarkable  There is platelike atelectasis at the left lung base  No large pleural effusion or evident pneumothorax  Visualized osseous structures appear within normal limits for the patient's age  Impression: Left basilar atelectasis  Workstation performed: [id number]     Imaging Reports Reviewed by myself    Cultures:   Blood Culture:   Lab Results   Component Value Date    BLOODCX No Growth at 72 hrs  10/06/2017    BLOODCX No Growth at 72 hrs  10/06/2017     Urine Culture:   Lab Results   Component Value Date    URINECX 30,000-39,000 cfu/ml Escherichia coli (A) 10/06/2017     Sputum Culture: No components found for: SPUTUMCX  Wound Culture: No results found for: WOUNDCULT    Last 24 Hours Medication List:     aspirin 81 mg Oral QAM   atorvastatin 20 mg Oral Daily With Dinner   cefazolin 1,000 mg Intravenous Q8H   famotidine 20 mg Oral BID   heparin (porcine) 5,000 Units Subcutaneous Q8H [address]   levothyroxine 88 mcg Oral Early Morning   melatonin 3 mg Oral HS   mupirocin  Nasal Q12H [address]   potassium chloride 40 mEq Oral Once   saccharomyces boulardii 250 mg Oral BID With Meals   sodium chloride 1,000 mL Intravenous Once   Followed by      sodium chloride 1,000 mL Intravenous Once        Today, Patient Was Seen By: Tosin Bryant DO    ** Please Note: Dragon 360 Dictation voice to text software may have been used in the creation of this document   **

## 2017-10-11 VITALS
BODY MASS INDEX: 27.48 KG/M2 | HEIGHT: 60 IN | DIASTOLIC BLOOD PRESSURE: 87 MMHG | OXYGEN SATURATION: 96 % | SYSTOLIC BLOOD PRESSURE: 155 MMHG | RESPIRATION RATE: 18 BRPM | TEMPERATURE: 98.1 F | WEIGHT: 140 LBS | HEART RATE: 78 BPM

## 2017-10-11 PROBLEM — A41.9 SEPSIS (HCC): Status: RESOLVED | Noted: 2017-10-06 | Resolved: 2017-10-11

## 2017-10-11 PROBLEM — B96.20 E-COLI UTI: Status: ACTIVE | Noted: 2017-10-11

## 2017-10-11 PROBLEM — N39.0 E-COLI UTI: Status: ACTIVE | Noted: 2017-10-11

## 2017-10-11 PROBLEM — R10.9 ABDOMINAL PAIN IN FEMALE PATIENT: Status: ACTIVE | Noted: 2017-10-06

## 2017-10-11 LAB
ANION GAP SERPL CALCULATED.3IONS-SCNC: 11 MMOL/L (ref 4–13)
BACTERIA BLD CULT: NORMAL
BACTERIA BLD CULT: NORMAL
BUN SERPL-MCNC: 12 MG/DL (ref 5–25)
CALCIUM SERPL-MCNC: 9.2 MG/DL (ref 8.3–10.1)
CHLORIDE SERPL-SCNC: 105 MMOL/L (ref 100–108)
CO2 SERPL-SCNC: 26 MMOL/L (ref 21–32)
CREAT SERPL-MCNC: 0.68 MG/DL (ref 0.6–1.3)
GFR SERPL CREATININE-BSD FRML MDRD: 87 ML/MIN/1.73SQ M
GLUCOSE SERPL-MCNC: 99 MG/DL (ref 65–140)
POTASSIUM SERPL-SCNC: 4.4 MMOL/L (ref 3.5–5.3)
SODIUM SERPL-SCNC: 142 MMOL/L (ref 136–145)
TSH SERPL DL<=0.05 MIU/L-ACNC: 12.42 UIU/ML (ref 0.36–3.74)

## 2017-10-11 PROCEDURE — 80048 BASIC METABOLIC PNL TOTAL CA: CPT | Performed by: FAMILY MEDICINE

## 2017-10-11 PROCEDURE — 94760 N-INVAS EAR/PLS OXIMETRY 1: CPT

## 2017-10-11 PROCEDURE — 84443 ASSAY THYROID STIM HORMONE: CPT | Performed by: FAMILY MEDICINE

## 2017-10-11 RX ORDER — ATORVASTATIN CALCIUM 20 MG/1
20 TABLET, FILM COATED ORAL
Qty: 30 TABLET | Refills: 0 | Status: SHIPPED | OUTPATIENT
Start: 2017-10-11 | End: 2018-07-06

## 2017-10-11 RX ORDER — CEFADROXIL 1000 MG/1
1 TABLET ORAL 2 TIMES DAILY
Qty: 10 TABLET | Refills: 0 | Status: SHIPPED | OUTPATIENT
Start: 2017-10-11 | End: 2017-10-16

## 2017-10-11 RX ORDER — LEVOTHYROXINE SODIUM 0.1 MG/1
100 TABLET ORAL EVERY MORNING
Qty: 30 TABLET | Refills: 1 | Status: SHIPPED | OUTPATIENT
Start: 2017-10-11 | End: 2018-11-08 | Stop reason: DRUGHIGH

## 2017-10-11 RX ORDER — SUCRALFATE ORAL 1 G/10ML
1000 SUSPENSION ORAL EVERY 6 HOURS SCHEDULED
Qty: 420 ML | Refills: 0 | Status: SHIPPED | OUTPATIENT
Start: 2017-10-11 | End: 2017-10-23 | Stop reason: HOSPADM

## 2017-10-11 RX ADMIN — HEPARIN SODIUM 5000 UNITS: 5000 INJECTION, SOLUTION INTRAVENOUS; SUBCUTANEOUS at 05:36

## 2017-10-11 RX ADMIN — FAMOTIDINE 20 MG: 20 TABLET ORAL at 08:21

## 2017-10-11 RX ADMIN — LEVOTHYROXINE SODIUM 88 MCG: 88 TABLET ORAL at 05:36

## 2017-10-11 RX ADMIN — SUCRALFATE 1000 MG: 1 SUSPENSION ORAL at 00:27

## 2017-10-11 RX ADMIN — CEFAZOLIN SODIUM 1000 MG: 1 SOLUTION INTRAVENOUS at 10:48

## 2017-10-11 RX ADMIN — CEFAZOLIN SODIUM 1000 MG: 1 SOLUTION INTRAVENOUS at 03:55

## 2017-10-11 RX ADMIN — Medication 1 APPLICATION: at 08:22

## 2017-10-11 RX ADMIN — SUCRALFATE 1000 MG: 1 SUSPENSION ORAL at 05:36

## 2017-10-11 RX ADMIN — ASPIRIN 81 MG 81 MG: 81 TABLET ORAL at 08:21

## 2017-10-11 RX ADMIN — Medication 250 MG: at 08:21

## 2017-10-11 NOTE — DISCHARGE SUMMARY
Discharge Summary - [facility]    Patient Information: Adelina Rubi 67 y o  female MRN: [id number]  Unit/Bed#: [address] [address] Encounter: [id number]    Discharging Physician / Practitioner: Melissa Valenzuela DO  PCP: Mike Patel  Admission Date: 10/6/2017  Discharge Date: 10/11/17    Reason for Admission: Fever - 9 weeks to 74 years (pt states that she had a flu shot aroun 1300  pt states that she spiked a fever shortly after  pt presents to the ed with a temp of 103 9  oral  pt has no other complaints )      Discharge Diagnoses:     Principal Problem (Resolved):    Sepsis ([address] )  Active Problems:    Hypothyroidism    HLD (hyperlipidemia)    Asthma    Abdominal pain in female patient    E-coli UTI      Consultations During Hospital Stay:  IP CONSULT TO GASTROENTEROLOGY    Procedures Performed:     · None    Significant Findings:     · See hospital course    Imaging while in hospital:    Ct Abdomen Pelvis Wo Contrast    Result Date: 10/9/2017  Narrative: CT ABDOMEN AND PELVIS WITHOUT IV CONTRAST INDICATION:  Abdominal pain and fever  COMPARISON: 2/11/2014  TECHNIQUE:  CT examination of the abdomen and pelvis was performed without intravenous contrast   Reformatted images were created in axial, sagittal, and coronal planes  Radiation dose length product (DLP) for this visit:  524 76 mGy-cm   This examination, like all CT scans performed in the [facility], was performed utilizing techniques to minimize radiation dose exposure, including the use of iterative  reconstruction and automated exposure control  Enteric contrast was administered  FINDINGS: ABDOMEN LOWER CHEST:  Bibasilar atelectasis is present  LIVER/BILIARY TREE:  Unremarkable  GALLBLADDER:  Gallbladder is surgically absent  SPLEEN:  Unremarkable  PANCREAS:  Unremarkable  ADRENAL GLANDS:  Unremarkable  KIDNEYS/URETERS:  Left extrarenal pelvis is present  No calculi or hydronephrosis   STOMACH AND BOWEL:  There is colonic diverticulosis without evidence of acute diverticulitis  APPENDIX:  No findings to suggest appendicitis  ABDOMINOPELVIC CAVITY:  No ascites or free intraperitoneal air  No lymphadenopathy  VESSELS:  Unremarkable for patient's age  PELVIS REPRODUCTIVE ORGANS:  Patient is status post hysterectomy  URINARY BLADDER:  Unremarkable  ABDOMINAL WALL/INGUINAL REGIONS:  Unremarkable  OSSEOUS STRUCTURES:  No acute fracture or destructive osseous lesion  Impression: Diverticulosis without active diverticulitis  No acute abnormality within the abdomen or pelvis  Workstation performed: [id number]     Xr Chest 1 View Portable    Result Date: 10/6/2017  Narrative: CHEST INDICATION:  Fever COMPARISON:  Chest radiograph 2/14/2016 VIEWS:   AP frontal IMAGES:  1 FINDINGS:     Cardiomediastinal silhouette appears unremarkable  There is platelike atelectasis at the left lung base  No large pleural effusion or evident pneumothorax  Visualized osseous structures appear within normal limits for the patient's age  Impression: Left basilar atelectasis  Workstation performed: [id number]       Incidental Findings:   · none    Test Results Pending at Discharge (will require follow up):   · As per After Visit Summary     Outpatient Tests Requested:  · TSH level in 4-6 weeks    Complications:  See hospital course    Hospital Course:     Ella Chaudhary is a 67 y o  female patient who originally presented to the hospital on 10/6/2017 due to fevers, chills, sinus congestion  Also reported mild midepigastric pain  Patient was admitted with sepsis due to UTI and possible bronchitis  She was started on levaquin  Urine culture grew E  Coli resistant to Levaquin  She was then changed to IV ancef  Urine legionella and pneumococcal Ag was neg  Her postprandial abdominal pain is chronic for her  She was seen by GI and will get EGD and colonoscopy as outpatient with GI  She was started on carafate as per GI recommendations   She was tolerating her diet  TSH level was noted to be high and dose of levothyroxine was increased and she will get repeat TSH level in 4-6 weeks with PMD  Plan was d/w daughter as well yesterday  She was d/c'ed home on po Abx    Condition at Discharge: stable     Discharge Day Visit / Exam:     Subjective:  Denies any abdominal pain, nausea, vomiting    Vitals: Blood Pressure: 140/65 (10/10/17 2314)  Pulse: 69 (10/10/17 2314)  Temperature: 97 5 °F (36 4 °C) (10/10/17 2314)  Temp Source: Tympanic (10/10/17 2314)  Respirations: 18 (10/10/17 2314)  Height: 5' (152 4 cm) (10/06/17 2115)  Weight - Scale: 63 5 kg (140 lb) (10/06/17 2115)  SpO2: 96 % (10/11/17 0825)  Exam:   Physical Exam   Constitutional: She appears well-developed and well-nourished  No distress  HENT:   Head: Normocephalic and atraumatic  Eyes: EOM are normal  Right eye exhibits no discharge  Left eye exhibits no discharge  No scleral icterus  Neck: Neck supple  No tracheal deviation present  Cardiovascular: Normal rate and regular rhythm  Murmur (RYAN) heard  Pulmonary/Chest: Effort normal and breath sounds normal  No respiratory distress  She has no wheezes  She has no rales  Abdominal: Soft  Bowel sounds are normal  She exhibits no distension  There is no tenderness  Musculoskeletal: She exhibits no edema  Neurological: She is alert  No cranial nerve deficit  Skin: Skin is dry  She is not diaphoretic  Psychiatric: She has a normal mood and affect  Discharge instructions/Information to patient and family:(Discharge Medications and Follow up):   See after visit summary for information provided to patient and family  Provisions for Follow-Up Care:  See after visit summary for information related to follow-up care and any pertinent home health orders  Disposition: Home    Planned Readmission:  No     Discharge Statement:  I spent > 30 minutes discharging the patient  This time was spent on the day of discharge   I had direct contact with the patient on the day of discharge  Greater than 50% of the total time was spent examining patient, answering all patient questions, arranging and discussing plan of care with patient as well as directly providing post-discharge instructions  Additional time then spent on discharge activities  Discharge Medications:  See after visit summary for reconciled discharge medications provided to patient and family  ** Please Note: Dragon 360 Dictation voice to text software may have been used in the creation of this document   **

## 2017-10-20 ENCOUNTER — GENERIC CONVERSION - ENCOUNTER (OUTPATIENT)
Dept: OTHER | Facility: OTHER | Age: 72
End: 2017-10-20

## 2017-10-23 ENCOUNTER — ANESTHESIA EVENT (OUTPATIENT)
Dept: PERIOP | Facility: AMBULARY SURGERY CENTER | Age: 72
End: 2017-10-23
Payer: MEDICARE

## 2017-10-23 ENCOUNTER — GENERIC CONVERSION - ENCOUNTER (OUTPATIENT)
Dept: OTHER | Facility: OTHER | Age: 72
End: 2017-10-23

## 2017-10-23 ENCOUNTER — HOSPITAL ENCOUNTER (OUTPATIENT)
Facility: AMBULARY SURGERY CENTER | Age: 72
Setting detail: OUTPATIENT SURGERY
Discharge: HOME | End: 2017-10-23
Attending: INTERNAL MEDICINE | Admitting: INTERNAL MEDICINE
Payer: MEDICARE

## 2017-10-23 ENCOUNTER — ANESTHESIA (OUTPATIENT)
Dept: PERIOP | Facility: AMBULARY SURGERY CENTER | Age: 72
End: 2017-10-23
Payer: MEDICARE

## 2017-10-23 VITALS
TEMPERATURE: 97.4 F | BODY MASS INDEX: 26.7 KG/M2 | OXYGEN SATURATION: 95 % | RESPIRATION RATE: 18 BRPM | HEIGHT: 60 IN | SYSTOLIC BLOOD PRESSURE: 115 MMHG | HEART RATE: 80 BPM | DIASTOLIC BLOOD PRESSURE: 70 MMHG | WEIGHT: 136 LBS

## 2017-10-23 DIAGNOSIS — K21.9 GERD WITHOUT ESOPHAGITIS: ICD-10-CM

## 2017-10-23 DIAGNOSIS — D12.6 ADENOMATOUS COLON POLYP: ICD-10-CM

## 2017-10-23 PROCEDURE — 88305 TISSUE EXAM BY PATHOLOGIST: CPT | Performed by: INTERNAL MEDICINE

## 2017-10-23 RX ORDER — PROPOFOL 10 MG/ML
INJECTION, EMULSION INTRAVENOUS AS NEEDED
Status: DISCONTINUED | OUTPATIENT
Start: 2017-10-23 | End: 2017-10-23 | Stop reason: SURG

## 2017-10-23 RX ORDER — PROPOFOL 10 MG/ML
INJECTION, EMULSION INTRAVENOUS CONTINUOUS PRN
Status: DISCONTINUED | OUTPATIENT
Start: 2017-10-23 | End: 2017-10-23 | Stop reason: SURG

## 2017-10-23 RX ORDER — SODIUM CHLORIDE, SODIUM LACTATE, POTASSIUM CHLORIDE, CALCIUM CHLORIDE 600; 310; 30; 20 MG/100ML; MG/100ML; MG/100ML; MG/100ML
125 INJECTION, SOLUTION INTRAVENOUS CONTINUOUS
Status: DISCONTINUED | OUTPATIENT
Start: 2017-10-23 | End: 2017-10-23 | Stop reason: HOSPADM

## 2017-10-23 RX ADMIN — SODIUM CHLORIDE, POTASSIUM CHLORIDE, SODIUM LACTATE AND CALCIUM CHLORIDE 125 ML/HR: 600; 310; 30; 20 INJECTION, SOLUTION INTRAVENOUS at 08:36

## 2017-10-23 RX ADMIN — PROPOFOL 80 MG: 10 INJECTION, EMULSION INTRAVENOUS at 09:57

## 2017-10-23 RX ADMIN — PROPOFOL 100 MCG/KG/MIN: 10 INJECTION, EMULSION INTRAVENOUS at 09:57

## 2017-10-23 RX ADMIN — PROPOFOL 50 MG: 10 INJECTION, EMULSION INTRAVENOUS at 10:05

## 2017-10-23 NOTE — OP NOTE
COLONOSCOPY    PROCEDURE: Colonoscopy/ Polypectomny (Cold Biopsy)    INDICATIONS: Family History of Colon Cancer, History of Colon Polyps    POST-OP DIAGNOSIS: See the impression below    SEDATION: Monitored anesthesia care, check anesthesia records    PHYSICAL EXAM:    /61   Pulse 96   Temp 97 6 °F (36 4 °C) (Temporal)   Resp 18   Ht 5' (1 524 m)   Wt 61 7 kg (136 lb)   SpO2 95%   BMI 26 56 kg/m²   Body mass index is 26 56 kg/m²  General: NAD  Heart: S1 & S2 normal, RRR  Lungs: CTA, No rales or rhonchi  Abdomen: Soft, nontender, nondistended, good bowel sounds    CONSENT:  Informed consent was obtained for the procedure, including sedation after explaining the risks and benefits of the procedure  Risks including but not limited to bleeding, perforation, infection, aspiration were discussed in detail  Also explained about less than 100%$ sensitivity with the exam and other alternatives  PREPARATION:   EKG tracing, pulse oximetry, blood pressure were monitored throughout the procedure  Patient was identified by myself both verbally and by visual inspection of ID band  DESCRIPTION:   Patient was placed in the left lateral decubitus position and was sedated with the above medication  Digital rectal examination was performed  The colonoscope was introduced in to the anal canal and advanced up to cecum, which was identified by the appendiceal orifice and IC valve  A careful inspection was made as the colonoscope was withdrawn, including a retroflexed view of the rectum; findings and interventions are described below  Appropriate photodocumentation was obtained  The quality of the colonic preparation was adequate  FINDINGS:    1  Cecum and ileocecal valve-normal mucosa    2  Sigmoid colon-diverticuli seen    3    Rectum and anal canal-couple of diminutive polyps were removed with cold biopsy forceps         IMPRESSIONS:      As above    RECOMMENDATIONS:    Check pathology    High-fiber diet    COMPLICATIONS:  None; patient tolerated the procedure well      DISPOSITION: PACU           CONDITION: Stable

## 2017-10-23 NOTE — ANESTHESIA PREPROCEDURE EVALUATION
Review of Systems/Medical History  Patient summary reviewed        Cardiovascular  Hyperlipidemia,    Pulmonary  Asthma: , ,        GI/Hepatic    GERD ,  Hiatal hernia,             Endo/Other  History of thyroid disease , hypothyroidism, Arthritis     GYN       Hematology   Musculoskeletal       Neurology      Comment: Fibromyalgia Psychology   Anxiety,            Physical Exam    Airway    Mallampati score: II  TM Distance: >3 FB  Neck ROM: full     Dental       Cardiovascular      Pulmonary      Other Findings        Anesthesia Plan  ASA Score- 2       Anesthesia Type- IV sedation with anesthesia with ASA Monitors  Additional Monitors:   Airway Plan:           Induction- intravenous  Informed Consent- Anesthetic plan and risks discussed with patient  I personally reviewed this patient with the CRNA  Discussed and agreed on the Anesthesia Plan with the CRNA  Bar Haynes

## 2017-10-23 NOTE — OP NOTE
ESOPHAGOGASTRODUODENOSCOPY    PROCEDURE: EGD    INDICATIONS: GERD    POST-OP DIAGNOSIS: See the impression below    SEDATION: Monitored anesthesia care, check anesthesia records    PHYSICAL EXAM:    Vitals:    10/23/17 0829   BP: 134/61   Pulse: 96   Resp: 18   Temp: 97 6 °F (36 4 °C)   SpO2: 95%    Body mass index is 26 56 kg/m²  General: NAD  Heart: S1 & S2 normal, RRR  Lungs: CTA, No rales or rhonchi  Abdomen: Soft, nontender, nondistended, good bowel sounds    CONSENT:  Informed consent was obtained for the procedure, including sedation after explaining the risks and benefits of the procedure  Risks including but not limited to bleeding, perforation, infection, aspiration were discussed in detail  Also explained about less than 100% sensitivity with the exam and other alternatives  PREPARATION:   EKG tracing, pulse oximetry, blood pressure were monitored throughout the procedure  Patient was identified by myself both verbally and by visual inspection of ID band  DESCRIPTION:   Patient was placed in the left lateral decubitus position and was sedated with the above medication  The gastroscope was introduced in to the oropharynx and the esophagus was intubated under direct visualization  Scope was passed down the esophagus up to 2nd part of the duodenum  A careful inspection was made as the gastroscope was withdrawn, including a retroflexed view of the stomach; findings and interventions are described below  FINDINGS:    #1  Esophagus and GEJ-normal mucosa  #2  Stomach-normal    #3  Duodenum-normal         IMPRESSIONS:      As above    RECOMMENDATIONS:     Patient was explained about the lifestyle and dietary modifications  Advised to avoid fatty foods, chocolates, caffeine, alcohol and any other triggering foods  Avoid eating for at least 3 hours before going to bed  Continue famotidine          COMPLICATIONS:  None; patient tolerated the procedure well            DISPOSITION: PACU CONDITION: Stable

## 2017-10-23 NOTE — H&P
History and Physical -  [facility]  Mil Maico 67 y o  female MRN: [id number]        HPI:  78-year-old female with history of multiple medical problems has issues with acid reflux  She has history of colon polyps and her mother had colon cancer        Historical Information   Past Medical History:   Diagnosis Date    Ankle fracture, right     casted    Anxiety     Arthritis     Asthma     Bursitis     Constipation     Cystitis     chronic    Diverticulosis     Fibromyalgia     Fibromyalgia, primary     GERD (gastroesophageal reflux disease)     H/o Lyme disease     Hiatal hernia     History of Clostridium difficile infection     Hyperlipidemia     Hypothyroid     hypo    Labral tear of shoulder     left    Lichen sclerosus of female genitalia 2016    Murmur     Neck pain, chronic     gets steroid injections-none since late 2016    Neuralgia     Spinal stenosis     Ulcer (HCC)     Ulcer of gastric fundus     Wears glasses     Wears partial dentures     upper and lower     Past Surgical History:   Procedure Laterality Date    ABDOMINAL SURGERY      exploratory-removal of appendix    APPENDECTOMY      CARPAL TUNNEL RELEASE Bilateral     CHOLECYSTECTOMY      COLONOSCOPY      DILATION AND CURETTAGE OF UTERUS      x3 miscarriages    FOOT SURGERY Right     5th toe-hammertoe repair    HYSTERECTOMY      complete    ROTATOR CUFF REPAIR Right     TONSILLECTOMY       Social History   History   Alcohol Use No     History   Drug Use No     History   Smoking Status    Never Smoker   Smokeless Tobacco    Never Used     Family History   Problem Relation Age of Onset    Cancer Mother      colon    Alzheimer's disease Father     Heart disease Sister      MI x4-angioplasty x4 stents    Cancer Brother      brain tumor-age 6    Cancer Brother 72     prostate       Meds/Allergies     Prescriptions Prior to Admission   Medication    albuterol (ACCUNEB) 0 63 MG/3ML nebulizer solution    albuterol (PROVENTIL HFA,VENTOLIN HFA) 90 mcg/act inhaler    cycloSPORINE (RESTASIS) 0 05 % ophthalmic emulsion    famotidine (PEPCID) 20 mg tablet    levothyroxine 100 mcg tablet    melatonin 3 mg    POTASSIUM CHLORIDE PO    aspirin 81 mg chewable tablet    atorvastatin (LIPITOR) 20 mg tablet    baclofen 20 mg tablet    multivitamin (THERAGRAN) TABS    NON FORMULARY    NON FORMULARY    Probiotic Product (PROBIOTIC DAILY PO)    sucralfate (CARAFATE) 1 g/10 mL suspension       Allergies   Allergen Reactions    Omnipaque [Iohexol] Other (See Comments)     Shakes, "passed out"    Pneumovax 23 [Pneumococcal Vac Polyvalent] Hives    Aspirin GI Intolerance     Cannot take a dose higher than 81mg-pt has a hx of ulcer    Codeine GI Intolerance     N/V    Flexeril [Cyclobenzaprine]      Unknown reaction per pt  Allergy was noted on physicians note    Other Rash     Adhesive Tape-caused a rash       Objective     Blood pressure 134/61, pulse 96, temperature 97 6 °F (36 4 °C), temperature source Temporal, resp  rate 18, height 5' (1 524 m), weight 61 7 kg (136 lb), SpO2 95 %, not currently breastfeeding      PHYSICAL EXAM:    Gen: NAD  CV: S1 & S2 normal, RRR  CHEST: Clear to auscultate  ABD: soft, NT/ND, good bowel sounds  EXT: no edema    ASSESSMENT:     GERD, personal history of colon polyps, family history of colon cancer    PLAN:    EGD and colonoscopy

## 2017-10-29 ENCOUNTER — GENERIC CONVERSION - ENCOUNTER (OUTPATIENT)
Dept: OTHER | Facility: OTHER | Age: 72
End: 2017-10-29

## 2017-11-07 ENCOUNTER — OFFICE VISIT (OUTPATIENT)
Dept: LAB | Facility: HOSPITAL | Age: 72
End: 2017-11-07
Attending: ORTHOPAEDIC SURGERY
Payer: MEDICARE

## 2017-11-07 ENCOUNTER — TRANSCRIBE ORDERS (OUTPATIENT)
Dept: ADMINISTRATIVE | Facility: HOSPITAL | Age: 72
End: 2017-11-07

## 2017-11-07 ENCOUNTER — APPOINTMENT (OUTPATIENT)
Dept: PREADMISSION TESTING | Facility: HOSPITAL | Age: 72
End: 2017-11-07
Payer: MEDICARE

## 2017-11-07 VITALS — BODY MASS INDEX: 26.95 KG/M2 | WEIGHT: 138 LBS

## 2017-11-07 DIAGNOSIS — Z01.818 PREOP TESTING: Primary | ICD-10-CM

## 2017-11-07 DIAGNOSIS — Z01.818 PREOP TESTING: ICD-10-CM

## 2017-11-07 PROCEDURE — 93005 ELECTROCARDIOGRAM TRACING: CPT

## 2017-11-07 NOTE — PRE-PROCEDURE INSTRUCTIONS
Pre-Surgery Instructions:   Medication Instructions    albuterol (ACCUNEB) 0 63 MG/3ML nebulizer solution Instructed patient per Anesthesia Guidelines   albuterol (PROVENTIL HFA,VENTOLIN HFA) 90 mcg/act inhaler Instructed patient per Anesthesia Guidelines   aspirin 81 mg chewable tablet Instructed patient per Anesthesia Guidelines   atorvastatin (LIPITOR) 20 mg tablet Instructed patient per Anesthesia Guidelines   baclofen 20 mg tablet Instructed patient per Anesthesia Guidelines   cycloSPORINE (RESTASIS) 0 05 % ophthalmic emulsion Instructed patient per Anesthesia Guidelines   famotidine (PEPCID) 20 mg tablet Instructed patient per Anesthesia Guidelines   levothyroxine 100 mcg tablet Instructed patient per Anesthesia Guidelines   melatonin 3 mg Instructed patient per Anesthesia Guidelines   multivitamin (THERAGRAN) TABS Instructed patient per Anesthesia Guidelines   NON FORMULARY Instructed patient per Anesthesia Guidelines   NON FORMULARY Instructed patient per Anesthesia Guidelines   POTASSIUM CHLORIDE PO Instructed patient per Anesthesia Guidelines   Probiotic Product (PROBIOTIC DAILY PO) Instructed patient per Anesthesia Guidelines  Pre op instructions reviewed with pt  Instructed to take famotidine, levothyroxine and albuterol inhaler a m of surgery    Basilia Montes [phone], [address] [address]  My Surgical Experience    The following information was developed to assist you to prepare for your operation  What do I need to do before coming to the hospital?   Arrange for a responsible person to drive you to and from the hospital    Arrange care for your children at home  Children are not allowed in the recovery areas of the hospital   Plan to wear clothing that is easy to put on and take off   If you are having shoulder surgery, wear a shirt that buttons or zippers in the front  Bathing  o Shower the evening before and the morning of your surgery with an antibacterial soap  Please refer to the Pre Op Showering Instructions for Surgery Patients Sheet   o Remove nail polish and all body piercing jewelry  o Do not shave any body part for at least 24 hours before surgery-this includes face, arms, legs and upper body  Food  o Nothing to eat or drink after midnight the night before your surgery  This includes candy and chewing gum  o Exception: If your surgery is after 12:00pm (noon), you may have clear liquids such as 7-Up®, ginger ale, apple or cranberry juice, Jell-O®, water, or clear broth until 8:00 am  o Do not drink milk or juice with pulp on the morning before surgery  o Do not drink alcohol 24 hours before surgery  Medicine  o Follow instructions you received from your surgeon about which medicines you may take on the day of surgery  o If instructed to take medicine on the morning of surgery, take pills with just a small sip of water  Call your prescribing doctor for specific information on what to do if you take insulin    What should I bring to the hospital?    Bring:  Alli Mandes or a walker, if you have them, for foot or knee surgery   A list of the daily medicines, vitamins, minerals, herbals and nutritional supplements you take  Include the dosages of medicines and the time you take them each day   Glasses, dentures or hearing aids   Minimal clothing; you will be wearing hospital sleepwear   Photo ID; required to verify your identity   If you have a Living Will or Power of , bring a copy of the documents   If you have an ostomy, bring an extra pouch and any supplies you use    Do not bring   Medicines or inhalers   Money, valuables or jewelry    What other information should I know about the day of surgery?  Notify your surgeons if you develop a cold, sore throat, cough, fever, rash or any other illness     Report to the Ambulatory Surgical/Same Day Surgery Unit   You will be instructed to stop at Registration only if you have not been pre-registered   Inform your  fi they do not stay that they will be asked by the staff to leave a phone number where they can be reached   Be available to be reached before surgery  In the event the operating room schedule changes, you may be asked to come in earlier or later than expected    *It is important to tell your doctor and others involved in your health care if you are taking or have been taking any non-prescription drugs, vitamins, minerals, herbals or other nutritional supplements   Any of these may interact with some food or medicines and cause a reaction

## 2017-11-09 LAB
ATRIAL RATE: 85 BPM
P AXIS: 29 DEGREES
PR INTERVAL: 154 MS
QRS AXIS: -19 DEGREES
QRSD INTERVAL: 72 MS
QT INTERVAL: 382 MS
QTC INTERVAL: 454 MS
T WAVE AXIS: 28 DEGREES
VENTRICULAR RATE: 85 BPM

## 2017-11-12 ENCOUNTER — ANESTHESIA EVENT (OUTPATIENT)
Dept: PERIOP | Facility: AMBULARY SURGERY CENTER | Age: 72
End: 2017-11-12
Payer: MEDICARE

## 2017-11-12 NOTE — ANESTHESIA PREPROCEDURE EVALUATION
Hypothyroidism   HLD (hyperlipidemia)   Asthma   Abdominal pain in female patient   E-coli UTI     Bursitis    Neuralgia    Cystitis chronic   GERD (gastroesophageal reflux disease)    Ulcer (HCC)    Hyperlipidemia    Anxiety    Murmur    Hypothyroid hypo   Fibromyalgia    Fibromyalgia, primary    Spinal stenosis    Constipation    Ankle fracture, right casted   Diverticulosis    Ulcer of gastric fundus    Hiatal hernia    H/o Lyme disease    Lichen sclerosus of female genitalia    Arthritis    Wears glasses    Wears partial dentures upper and lower   Neck pain, chronic gets steroid injections-none since late 2016   Asthma    Labral tear of shoulder left   History of Clostridium difficile infection          Review of Systems/Medical History  Patient summary reviewed  Chart reviewed      Cardiovascular  Hyperlipidemia,    Pulmonary  Asthma: , ,        GI/Hepatic    GERD ,  Hiatal hernia,             Endo/Other  History of thyroid disease , hypothyroidism, Arthritis     GYN       Hematology   Musculoskeletal       Neurology    Neuromuscular disease ,    Psychology   Anxiety,            Physical Exam    Airway    Mallampati score: II  TM Distance: >3 FB  Neck ROM: full     Dental   upper dentures and lower dentures,     Cardiovascular  Rhythm: regular, Rate: normal,     Pulmonary  Breath sounds clear to auscultation,     Other Findings        Anesthesia Plan  ASA Score- 3       Anesthesia Type- general and regional with ASA Monitors  Additional Monitors:   Airway Plan:           Induction- intravenous  Informed Consent- Anesthetic plan and risks discussed with patient

## 2017-11-13 ENCOUNTER — HOSPITAL ENCOUNTER (OUTPATIENT)
Facility: AMBULARY SURGERY CENTER | Age: 72
Setting detail: OUTPATIENT SURGERY
Discharge: HOME | End: 2017-11-13
Attending: ORTHOPAEDIC SURGERY | Admitting: ORTHOPAEDIC SURGERY
Payer: MEDICARE

## 2017-11-13 ENCOUNTER — ANESTHESIA (OUTPATIENT)
Dept: PERIOP | Facility: AMBULARY SURGERY CENTER | Age: 72
End: 2017-11-13
Payer: MEDICARE

## 2017-11-13 VITALS
TEMPERATURE: 97.2 F | OXYGEN SATURATION: 97 % | DIASTOLIC BLOOD PRESSURE: 71 MMHG | HEART RATE: 99 BPM | RESPIRATION RATE: 18 BRPM | SYSTOLIC BLOOD PRESSURE: 148 MMHG

## 2017-11-13 PROCEDURE — C1713 ANCHOR/SCREW BN/BN,TIS/BN: HCPCS | Performed by: ORTHOPAEDIC SURGERY

## 2017-11-13 DEVICE — ANCHOR SUT 4.75 X 19.1MM W/CLD EYELET SWIVELOCK STRL: Type: IMPLANTABLE DEVICE | Site: SHOULDER | Status: FUNCTIONAL

## 2017-11-13 RX ORDER — PROPOFOL 10 MG/ML
INJECTION, EMULSION INTRAVENOUS AS NEEDED
Status: DISCONTINUED | OUTPATIENT
Start: 2017-11-13 | End: 2017-11-13 | Stop reason: SURG

## 2017-11-13 RX ORDER — FENTANYL CITRATE 50 UG/ML
INJECTION, SOLUTION INTRAMUSCULAR; INTRAVENOUS AS NEEDED
Status: DISCONTINUED | OUTPATIENT
Start: 2017-11-13 | End: 2017-11-13 | Stop reason: SURG

## 2017-11-13 RX ORDER — LIDOCAINE HYDROCHLORIDE 10 MG/ML
INJECTION, SOLUTION INFILTRATION; PERINEURAL AS NEEDED
Status: DISCONTINUED | OUTPATIENT
Start: 2017-11-13 | End: 2017-11-13 | Stop reason: SURG

## 2017-11-13 RX ORDER — MIDAZOLAM HYDROCHLORIDE 1 MG/ML
INJECTION INTRAMUSCULAR; INTRAVENOUS AS NEEDED
Status: DISCONTINUED | OUTPATIENT
Start: 2017-11-13 | End: 2017-11-13 | Stop reason: SURG

## 2017-11-13 RX ORDER — ONDANSETRON 2 MG/ML
4 INJECTION INTRAMUSCULAR; INTRAVENOUS ONCE AS NEEDED
Status: DISCONTINUED | OUTPATIENT
Start: 2017-11-13 | End: 2017-11-13 | Stop reason: HOSPADM

## 2017-11-13 RX ORDER — SODIUM CHLORIDE 9 MG/ML
125 INJECTION, SOLUTION INTRAVENOUS CONTINUOUS
Status: DISCONTINUED | OUTPATIENT
Start: 2017-11-13 | End: 2017-11-13 | Stop reason: HOSPADM

## 2017-11-13 RX ORDER — PROPOFOL 10 MG/ML
INJECTION, EMULSION INTRAVENOUS CONTINUOUS PRN
Status: DISCONTINUED | OUTPATIENT
Start: 2017-11-13 | End: 2017-11-13 | Stop reason: SURG

## 2017-11-13 RX ORDER — FENTANYL CITRATE/PF 50 MCG/ML
25 SYRINGE (ML) INJECTION
Status: DISCONTINUED | OUTPATIENT
Start: 2017-11-13 | End: 2017-11-13 | Stop reason: HOSPADM

## 2017-11-13 RX ADMIN — ROPIVACAINE HYDROCHLORIDE: 2 INJECTION, SOLUTION EPIDURAL; INFILTRATION at 15:53

## 2017-11-13 RX ADMIN — MIDAZOLAM HYDROCHLORIDE 2 MG: 1 INJECTION, SOLUTION INTRAMUSCULAR; INTRAVENOUS at 13:50

## 2017-11-13 RX ADMIN — FENTANYL CITRATE 50 MCG: 50 INJECTION, SOLUTION INTRAMUSCULAR; INTRAVENOUS at 14:51

## 2017-11-13 RX ADMIN — FENTANYL CITRATE 25 MCG: 50 INJECTION INTRAMUSCULAR; INTRAVENOUS at 15:25

## 2017-11-13 RX ADMIN — PROPOFOL 90 MCG/KG/MIN: 10 INJECTION, EMULSION INTRAVENOUS at 14:06

## 2017-11-13 RX ADMIN — CEFAZOLIN SODIUM 200 MG: 2 SOLUTION INTRAVENOUS at 14:20

## 2017-11-13 RX ADMIN — FENTANYL CITRATE 50 MCG: 50 INJECTION, SOLUTION INTRAMUSCULAR; INTRAVENOUS at 14:26

## 2017-11-13 RX ADMIN — CEFAZOLIN SODIUM 2000 MG: 2 SOLUTION INTRAVENOUS at 14:02

## 2017-11-13 RX ADMIN — FENTANYL CITRATE 25 MCG: 50 INJECTION, SOLUTION INTRAMUSCULAR; INTRAVENOUS at 14:43

## 2017-11-13 RX ADMIN — LIDOCAINE HYDROCHLORIDE 20 MG: 10 INJECTION, SOLUTION INFILTRATION; PERINEURAL at 14:06

## 2017-11-13 RX ADMIN — FENTANYL CITRATE 25 MCG: 50 INJECTION INTRAMUSCULAR; INTRAVENOUS at 15:13

## 2017-11-13 RX ADMIN — PROPOFOL 50 MG: 10 INJECTION, EMULSION INTRAVENOUS at 14:26

## 2017-11-13 RX ADMIN — SODIUM CHLORIDE: 0.9 INJECTION, SOLUTION INTRAVENOUS at 13:50

## 2017-11-13 RX ADMIN — FENTANYL CITRATE 25 MCG: 50 INJECTION, SOLUTION INTRAMUSCULAR; INTRAVENOUS at 14:39

## 2017-11-13 RX ADMIN — PROPOFOL 50 MG: 10 INJECTION, EMULSION INTRAVENOUS at 14:06

## 2017-11-13 RX ADMIN — FENTANYL CITRATE 50 MCG: 50 INJECTION, SOLUTION INTRAMUSCULAR; INTRAVENOUS at 13:50

## 2017-11-13 NOTE — ANESTHESIA POSTPROCEDURE EVALUATION
Post-Op Assessment Note      Mental Status:  Alert and awake    Hydration Status:  Stable    PONV Controlled:  None    Airway Patency:  Patent    Post Op Vitals Reviewed: Yes          Staff: Anesthesiologist           BP      Temp      Pulse     Resp      SpO2

## 2017-11-13 NOTE — ANESTHESIA PROCEDURE NOTES
Peripheral Block    Patient location during procedure: pre-op  Start time: 11/13/2017 2:00 PM  End time: 11/13/2017 2:15 PM  Reason for block: procedure for pain, at surgeon's request and post-op pain management  Staffing  Anesthesiologist: Fletcher Ibarra  Performed: anesthesiologist   Preanesthetic Checklist  Completed: patient identified, site marked, surgical consent, pre-op evaluation, timeout performed, IV checked, risks and benefits discussed and monitors and equipment checked  Peripheral Block  Patient position: supine  Prep: ChloraPrep  Patient monitoring: heart rate, continuous pulse ox and frequent blood pressure checks  Block type: interscalene  Laterality: left  Injection technique: single-shot and catheter  Procedures: ultrasound guided, nerve stimulator and paresthesia technique  Local infiltration: ropivacaine  Infiltration strength: 0 5 %  Dose: 30 mL  Needle  Needle type: [employer]   Needle gauge: 22 G  Needle length: 5 cm  Needle localization: anatomical landmarks, paresthesias and ultrasound guidance  Catheter type: open end  Catheter size: 16 G  Catheter at skin depth: 4 cm  Test dose: lidocaine 2% and negative  Assessment  Injection assessment: incremental injection, local visualized surrounding nerve on ultrasound, negative aspiration for heme, negative aspiration for CSF and no paresthesia on injection  Heart rate change: no  Slow fractionated injection: no  Post-procedure:  sterile dressing applied  patient tolerated the procedure well with no immediate complications

## 2017-11-13 NOTE — OP NOTE
OPERATIVE REPORT  PATIENT NAME: Patt Maloney    :  1945  MRN: [id number]  Pt Location: [facility] OR ROOM 02    SURGERY DATE: 2017    Surgeon(s) and Role:     * Dorys Chapin MD - Primary     * Dashawn Arciniega PA-C - Assisting necessary for the procedure for assistance with minimally invasive arthroscopic techniques for rotator cuff repair and decompression    Preop Diagnosis:  Incomplete rotator cuff tear [M75 110]    Post-Op Diagnosis Codes:     * Incomplete rotator cuff tear [M75 110] high-grade partial-thickness rotator cuff tear of supraspinatus tendon with significant subacromial impingement    Procedure(s) (LRB):  SHOULDER ARTHROSCOPY WITH SUBACROMIAL DECOMPRESSION, ROTATOR CUFF REPAIR (Left) using speed fix technique from [employer] with a swivel lock anchor and 1 FiberTape and 1 fiber link and 1 FiberWire suture    Specimen(s):  * No specimens in log *    Estimated Blood Loss:   Minimal    Drains:       Anesthesia Type:   IV SED/ON-Q    Operative Indications:  Incomplete rotator cuff tear [M75 110]  Melissa Garcia is a 65-year-old female who has been suffering long-term with left shoulder pain  She was found to have a partial rotator cuff tear on MRI but had significant weakness and pain and had failed conservative measures which included physical therapy and anti-inflammatories and ice  She wished to undergo a left shoulder arthroscopy for subacromial decompression and possible rotator cuff repair  She understood the risks and benefits of that procedure wished to go ahead  The risks are inclusive of but not limited to infection, stiffness, nerve injury causing numbness pain and weakness, persistence or recurrence of pain, recurrence of tear, failure to regain full strength and ability, worsening of symptoms, and need for further surgery      Operative Findings:  Left shoulder with a stable examination under anesthesia achieving forward flexion and abduction to 160 degrees with external rotation to 70 degrees and internal rotation to 50 degrees  Intra-articular findings demonstrated good appearance of articular cartilage in the glenohumeral joint with no loose bodies in the axillary pouch an intact long head of biceps tendon as well as an intact subscapularis tendon  Supraspinatus tendon had an obvious high-grade partial-thickness tear that was certainly greater than 50 percent of the fibers in the mid section of the supraspinatus tendon  We did debride that and found that this was a significant tear  We did complete that tear and repaired it with the speed fix technique  We also found a type 3 subacromial spur upon which we performed an acromioplasty for decompression  Complications:   None    Procedure and Technique:  Giancarlo Gregorio is taken to the operating room and placed supine on the OR table  She was given preoperative IV antibiotics  She was brought comfortably and safely into the beach chair position with all parts well padded and head in neutral position in the head burdick  The left shoulder was taken through examination under anesthesia as described above  The left upper extremity was then prepped and draped in the usual sterile fashion  A surgical time-out was taken  The posterior portal was made with an 11 blade  Diagnostic arthroscopy was begun and an anterior portal was made in the rotator interval with an 11 blade  We demonstrated good appearance of articular cartilage in the glenohumeral joint  No loose bodies in the axillary pouch  The the subscapularis tendon and long head of biceps tendon were both intact as was the labrum  The supraspinatus tendon however did have a significant amount of tearing on the undersurface  We did debride that tear and found that more than 50 percent of the greater tuberosity was exposed and thus a greater than 50 percent tear of the supraspinatus tendon    We did make a lateral portal with 11 blade and demonstrated easily with a spinal needle and then with the blunt trocar that this was a significantly high-grade partial-thickness tear  We then went to the subacromial space where we demonstrated type 3 subacromial spur  We did utilized the bur for an acromioplasty and decompression of this spur  We also debrided the high-grade partial-thickness supraspinatus tendon tear back to a stable rim of tissue by completing the tear with a shaver  We also debrided the greater tuberosity to bleeding bed of bone  We did place a horizontal mattress FiberTape suture and then utilized a rip stop technique with the fiber link suture and placed the anchor along the lateral aspect of the greater tuberosity  We had excellent coverage of a bleeding bed of bone utilizing this technique  We removed excess suture  We then utilized the FiberWire suture from the anchor and placed a simple stitch into the more posterior aspect of the tear and tied that down with arthroscopic knot tying techniques  We probed the tendon repair and found it to be stable  We then irrigated and removed arthroscopic equipment  We closed the portals with 4-0 nylon suture  Dry, sterile dressings applied with a sling  She tolerated the procedure well and transferred to the recovery room in stable condition  She will follow up in 1 week for suture removal   She will be on the rotator cuff repair protocol and have the sling for 6 weeks     I was present for the entire procedure and A qualified resident physician was not available    Patient Disposition:  PACU     SIGNATURE: Marly Rausch MD  DATE: November 13, 2017  TIME: 2:53 PM

## 2017-11-13 NOTE — DISCHARGE INSTRUCTIONS
Instruction Sheet Following RTC repair     Sling:   Wear your sling at all times after your surgery (this includes sleeping), except for when you are doing pendulum exercises, showering, or physical therapy  Additionally, you should not carry anything heavier than a pencil in your hand  Dressing:   Remove all cotton and yellow gauze 48 hours after your surgery  You do not need to put a new dressing on your wound; place Band-Aids on your wound  Showering: You may shower 48 hours after surgery  Please use CAUTION!! Be careful not to slip and fall  The effects of anesthesia and/or medication may make you drowsy or light-headed  Do not soak in a bathtub, hot tub, or pool until the doctor tells you it is O K , to do so  Once you are done showering pat the wound dry and apply a Band-Aid  While in the shower you must keep the arm across the front of the body as if it were still in the sling  Sleeping:   You will most likely have difficulty sleeping in the first few weeks after surgery  Most people find it more comfortable to sleep in a reclining position  You can either sleep in a recliner chair or create this position with pillows  Ice:   You can ice the shoulder to reduce swelling and discomfort  Do not ice the shoulder more than 20 minutes at a time  Let the shoulder warm up before reapplication  Avoid getting you wound wet  If you have a Cryocuff you may keep this on continuously  Follow-up visit:   You need to see the doctor about one week following surgery for your first post-op visit  At that time your sutures (stitches) will be removed  You will be given a prescription to begin physical therapy if you were not already given one  Common concerns:   Bruising and/or swelling of the shoulder, arm, or hand are common after surgery  To relieve this discomfort it is best to ice the shoulder  Please call if:   1  Any oozing or redness of the wound, fevers (>101 3°F), or chills       2  Any difficulty breathing or heaviness in the chest      REMEMBER - these are only guidelines for what to expect  If you have any questions or concerns, please do not hesitate to call the office  [phone]  ON-Q PAIN BALL INSTRUCTIONS    · You may refer to the patient information booklet for basic operating instructions  · Your pain ball is set to automatically deliver medication continuously to provide you with pain relied  · The ball will last on average between 2-3 days  · You may also take your pain medication as prescribed by your physician as needed  It is safe to take your pain pills while the pain pump is working  · It is important that you protect the limb that you have had surgery on from extreme hot and cold  This limb may be completely or partially numb from you block in addition to the pain pump,   · Never rest your affected limb on a very hard surface as this may result in nerve compression  Always rest your limb on soft pillows or cushions  · Keep dressing DRY  DO NOT SHOWER until the catheter has been removed  · Please DO NOT DRIVE OR OPERATE AND HEAVY MACHINERY UNTIL PAIN PUMP IS REMOVED and until released by your surgeon  · Try to keep the strap of the pouch away from the dressing so it does not displace the catheter  · Some leakage from the dressing over the catheter is normal   If your pain is well controlled, simply tape some guaze of a small towel around the edge of the dressing to absorb the excess fluid  Turning down the rate of your pump will decrease leakage    If you experience an increase in pain with this leakage then it is likely that your cathter has dislodged and you should call your anesthesiologist   · To call the anesthesiologist dial [phone] and ask for the anesthesiologist on call for any of the following  · Breakthrough pain that is uncontrolled even after adjustment of the pain pump and taking a pain pill  · Swelling or a lump around the catheter site  · Excessive tenderness redness or drainage around the catheter site  · Lightheadedness, dizziness, a metallic taste in your mouth, or numbness/tingling around your mouth, restlessness, blurred vision, ringing in the ears or excessive drowsiness   IMMEDIATELY CLAMP THE PAIN PUMP TUBING AND CALL THE ANESTHESIOLOGIST  · It is not uncommon to see a little blood on the dressing  If there is continuous oozing of blood from the dressing call the anesthesiologist   · When the pain pump is finished and removed, expect for the numbness and tingling to resolve within 24 hours  If it does not, call the anesthesiologist   · We are available by phone as needed when it comes time to take out the catheter if you need assistance      TO REMOVE THE PAIN PUMP AND CATHETER  · Wash your hands  · Peel off the tape and the clear dressing over the catheter  · Pull the pain catheter out if it did not come out when the dressing was removed  · Apply pressure for about 5 minutes and then apply a band-aide  · Inspect the site 15 minutes after removal of the catheter and check for swelling or bleeding  · If the catheter does not come out easily call the anesthesiologist,  · The pain pump is disposable and can be discarded in you household garbage          For any additional; questions or concerns regarding your pain pump, please call the anesthesiologist     DO NOT call the surgeon for issues related to the pain pump

## 2017-11-20 ENCOUNTER — ALLSCRIPTS OFFICE VISIT (OUTPATIENT)
Dept: OTHER | Facility: OTHER | Age: 72
End: 2017-11-20

## 2017-11-20 DIAGNOSIS — M75.122 COMPLETE ROTATOR CUFF TEAR OF LEFT SHOULDER: ICD-10-CM

## 2017-11-21 NOTE — PROGRESS NOTES
Assessment  1  Complete rotator cuff tear of left shoulder (297 61) (M81 122)    Chief Complaint    1  Shoulder Pain    Post-Op  Post-Op Shoulder:  BENEDICT FRANCISCO is status post arthroscopic rotator cuff repair and subacromial decompression of the left shoulder  The surgery was performed on 1 week ago  HPI: The patient reports no swelling,-- no excessive bleeding,-- no fever,-- no nausea-- and-- no shortness of breath  Patient has been sling at all times since the time of surgery  She notes moderate pain about the shoulder  PE: The surgical incision site was clean, dry and intact  The shoulder demonstrates ecchymosis-- and-- swelling, but-- no warmth,-- no induration,-- no erythema-- and-- no tenderness  Peripheral neurovascular exam reveals intact sensation to light touch-- and-- intact gross motor function  Assessment: Post-op, the patient has fair pain control, but-- is doing well-- and-- is showing no signs of infection  Plan: Activity Restrictions: per protocol-- and-- continue sling use at all times  Done this visit: remove sutures/staples-- and-- steristrip wound  To Do For Next Visit: Clinical recheck  PT Referral:  I hereby certify that these services are medically necessary  2 to 3 times a week for 4-6 weeks  She will start this in 1 more week  Follow up: 6 weeks  Active Problems    1  Abdominal pain (789 00) (R10 9)   2  Abnormal glucose (790 29) (R73 09)   3  Acute upper respiratory infection (465 9) (J06 9)   4  Adenomatous colon polyp (211 3) (D12 6)   5  Adult hypothyroidism (244 9) (E03 9)   6  Allergic rhinitis (477 9) (J30 9)   7  Angina pectoris (413 9) (I20 9)   8  Ankle pain, unspecified laterality   9  Anxiety disorder (300 00) (F41 9)   10  Arteriosclerotic coronary artery disease (414 00) (I25 10)   11  Arthritis (716 90) (M19 90)   12  Atypical chest pain (786 59) (R07 89)   13  Atypical face pain (350 2) (G50 1)   14  Benign essential hypertension (401 1) (I10)   15  Biceps tendinitis of right shoulder (726 12) (M75 21)   16  Bladder disorder (596 9) (N32 9)   17  Bloating (787 3) (R14 0)   18  Bursitis of hip (726 5) (M70 70)   19  Bursitis, subacromial (726 19) (M75 50)   20  Cervical neuralgia (723 4) (M54 12)   21  Cervical radiculopathy (723 4) (M54 12)   22  Cervical spinal stenosis (723 0) (M48 02)   23  Cervicalgia (723 1) (M54 2)   24  Change in stool habits (787 99) (R19 4)   25  Chronic cystitis (595 2) (N30 20)   26  Closed Fracture Of The Head Of The Right Talus (825 21)   27  Cloudy urine (791 9) (R82 90)   28  Colonoscopy (Fiberoptic) Screening   29  Constipation (564 00) (K59 00)   30  Cough (786 2) (R05)   31  Cough variant asthma (493 82) (J45 991)   32  Depression (311) (F32 9)   33  Diarrhea (787 91) (R19 7)   34  Diverticulitis of colon (562 11) (K57 32)   35  Diverticulosis (562 10) (K57 90)   36  Dizziness (780 4) (R42)   37  Dysuria (788 1) (R30 0)   38  Encounter for screening mammogram for malignant neoplasm of breast (V76 12)  (Z12 31)   39  Family history of colon cancer (V16 0) (Z80 0)   36  Fatigue (780 79) (R53 83)   41  Fever (780 60) (R50 9)   42  Fibromyalgia (729 1) (M79 7)   43  Fracture Of The Calcaneus (825 0)   44  Gastric ulcer (531 90) (K25 9)   45  GERD without esophagitis (530 81) (K21 9)   46  Greater trochanteric bursitis of right hip (726 5) (M70 61)   47  Hammer toe, unspecified laterality (735 4) (M20 40)   48  Heart burn (787 1) (R12)   49  Hiatal hernia (553 3) (K44 9)   50  Hip pain, right (719 45) (M25 551)   51  History of colon polyps (V12 72) (Z86 010)   52  Hyperlipidemia (272 4) (E78 5)   53  Hypothyroidism (244 9) (E03 9)   54  Impacted cerumen, unspecified laterality (380 4) (H61 20)   55  Impaired memory (780 93) (R41 3)   56  Internal Hemorrhoids With Complication (788 7)   57  Irritable bowel syndrome (564 1) (K58 9)   58  Localized osteoarthritis of hip (715 35) (M16 9)   59  Malaise (780 79) (R53 81)   60  Mononeuritis (355 9) (G58 9)   61  Motion Sickness (994 6)   62  Murmur (785 2) (R01 1)   63  Nausea (787 02) (R11 0)   64  Neck pain (723 1) (M54 2)   65  Need for influenza vaccination (V04 81) (Z23)   66  Nonvenomous Insect Bite (E906 4)   67  NSAID long-term use (V58 64) (Z79 1)   68  Partial tear of rotator cuff (840 4) (M75 110)   69  Peristernal perichondritis (733 6) (M94 0)   70  Peritonitis (567 9)   71  Primary osteoarthritis of right hip (715 15) (M16 11)   72  Pruritus ani (698 0) (L29 0)   73  Right shoulder pain (719 41) (M25 511)   74  Rotator cuff tendonitis, left (726 10) (M75 82)   75  Screening for diabetes mellitus (V77 1) (Z13 1)   76  Shakiness (781 0) (R25 1)   77  Shoulder arthritis (716 91) (M19 019)   78  Skin disorder (709 9) (L98 9)   79  Skin rash (782 1) (R21)   80  Sleep disorder (780 50) (G47 9)   81  Spasm of muscle (728 85) (M62 838)   82  Spinal stenosis (724 00) (M48 00)   83  Spondylosis of lumbar region without myelopathy or radiculopathy (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) (M47 816)   84  Tendinitis of left rotator cuff (726 10) (M75 82)   85  Thyroid disorder (246 9) (E07 9)   86  Tinea corporis (110 5) (B35 4)   87  Upper back pain (724 5) (M54 9)   88  Urinary frequency (788 41) (R35 0)   89  Urinary tract infection (599 0) (N39 0)    Social History     · Denied: History of Alcohol Use (History)   · Daily Coffee Consumption (___ Cups/Day)   · Denied: History of Drug Use   · Marital History - Currently    · Never A Smoker   · Occupation: Retired   · History of Sodomised   · Prior anal penetration without consent   · Stopped Drinking Alcohol   · 9 years    Current Meds   1  Acidophilus Oral Capsule; TAKE AS DIRECTED; Therapy: (Recorded:07Mar2017) to Recorded   2  Aspirin 81 MG TABS; 1 every day; Therapy: 96BBS5141 to  Requested for: 63KEN7804 Recorded   3  Atorvastatin Calcium 20 MG Oral Tablet; TAKE 1 TABLET DAILY AS DIRECTED;  Therapy: 93LBY1314 to (21 [phone])  Requested for: 60JZV9950; Last Rx:45Vbq3175 Ordered   4  Colace CAPS; take 1 capsule daily; Therapy: (21 265 ) to Recorded   5  Daily Multivitamin TABS; TAKE 1 TABLET DAILY; Therapy: (21 265 ) to Recorded   6  Melatonin 3 MG Oral Tablet; TAKE AS DIRECTED; Therapy: (21 265 ) to Recorded   7  Potassium 99 MG Oral Tablet; TAKE 1 TABLET DAILY; Therapy: (Recorded:21Uyq8940) to Recorded   8  ProAir  (90 Base) MCG/ACT Inhalation Aerosol Solution; INHALE 1 TO 2 PUFFS EVERY 6 HOURS AS NEEDED; Therapy: 24Apr2014 to (Evaluate:56Wiw6695)  Requested for: [phone]; Last Rx:27Apr2015 Ordered   9  Restasis 0 05 % Ophthalmic Emulsion; INSTILL 1 DROP IN [facility] EYE TWICE DAILY; Therapy: 21Apr2015 to Recorded   10  Synthroid 100 MCG Oral Tablet; TAKE 1 TABLET DAILY AS DIRECTED; Therapy: (Recorded:07Mar2017) to Recorded    Allergies  1  ASPIRIN   2  Codeine   3  Flexeril   4  Adhesive Tape TAPE   5  Iodinated Contrast Media   6  Pneumovax 23 INJ   7  Reglan  8  IV Dye   9  IVP Dye    Attending Note  Collaborating Physician Note: Collaborating Physician: I interviewed and examined the patient,-- I supervised the Advanced Practitioner-- and-- I agree with the Advanced Practitioner note  Future Appointments    Date/Time Provider Specialty Site   01/03/2018 09:50 AM AGUILAR Fitzpatrick  Orthopedic Surgery [facility]       Signatures   Electronically signed by :  Yury Silva, [facility]; Nov 20 2017  8:43AM EST                       (Author)    Electronically signed by : AGUILAR Diaz ; Nov 20 2017 12:05PM EST                       (Author)

## 2017-11-30 ENCOUNTER — APPOINTMENT (OUTPATIENT)
Dept: PHYSICAL THERAPY | Facility: CLINIC | Age: 72
End: 2017-11-30
Payer: MEDICARE

## 2017-11-30 PROCEDURE — 97164 PT RE-EVAL EST PLAN CARE: CPT

## 2017-11-30 PROCEDURE — G8984 CARRY CURRENT STATUS: HCPCS

## 2017-11-30 PROCEDURE — 97140 MANUAL THERAPY 1/> REGIONS: CPT

## 2017-11-30 PROCEDURE — G8985 CARRY GOAL STATUS: HCPCS

## 2017-12-04 ENCOUNTER — APPOINTMENT (OUTPATIENT)
Dept: PHYSICAL THERAPY | Facility: CLINIC | Age: 72
End: 2017-12-04
Payer: MEDICARE

## 2017-12-04 PROCEDURE — 97140 MANUAL THERAPY 1/> REGIONS: CPT

## 2017-12-04 PROCEDURE — 97110 THERAPEUTIC EXERCISES: CPT

## 2017-12-06 ENCOUNTER — APPOINTMENT (OUTPATIENT)
Dept: PHYSICAL THERAPY | Facility: CLINIC | Age: 72
End: 2017-12-06
Payer: MEDICARE

## 2017-12-06 PROCEDURE — 97140 MANUAL THERAPY 1/> REGIONS: CPT

## 2017-12-07 ENCOUNTER — GENERIC CONVERSION - ENCOUNTER (OUTPATIENT)
Dept: OBGYN CLINIC | Facility: CLINIC | Age: 72
End: 2017-12-07

## 2017-12-08 ENCOUNTER — APPOINTMENT (OUTPATIENT)
Dept: PHYSICAL THERAPY | Facility: CLINIC | Age: 72
End: 2017-12-08
Payer: MEDICARE

## 2017-12-08 PROCEDURE — 97140 MANUAL THERAPY 1/> REGIONS: CPT

## 2017-12-12 ENCOUNTER — APPOINTMENT (OUTPATIENT)
Dept: PHYSICAL THERAPY | Facility: CLINIC | Age: 72
End: 2017-12-12
Payer: MEDICARE

## 2017-12-12 PROCEDURE — 97140 MANUAL THERAPY 1/> REGIONS: CPT

## 2017-12-12 PROCEDURE — 97110 THERAPEUTIC EXERCISES: CPT

## 2017-12-14 ENCOUNTER — APPOINTMENT (OUTPATIENT)
Dept: PHYSICAL THERAPY | Facility: CLINIC | Age: 72
End: 2017-12-14
Payer: MEDICARE

## 2017-12-14 PROCEDURE — 97110 THERAPEUTIC EXERCISES: CPT

## 2017-12-14 PROCEDURE — 97140 MANUAL THERAPY 1/> REGIONS: CPT

## 2017-12-15 ENCOUNTER — APPOINTMENT (OUTPATIENT)
Dept: PHYSICAL THERAPY | Facility: CLINIC | Age: 72
End: 2017-12-15
Payer: MEDICARE

## 2017-12-15 PROCEDURE — 97140 MANUAL THERAPY 1/> REGIONS: CPT

## 2017-12-15 PROCEDURE — 97110 THERAPEUTIC EXERCISES: CPT

## 2017-12-19 ENCOUNTER — APPOINTMENT (OUTPATIENT)
Dept: PHYSICAL THERAPY | Facility: CLINIC | Age: 72
End: 2017-12-19
Payer: MEDICARE

## 2017-12-19 PROCEDURE — 97140 MANUAL THERAPY 1/> REGIONS: CPT

## 2017-12-21 ENCOUNTER — APPOINTMENT (OUTPATIENT)
Dept: PHYSICAL THERAPY | Facility: CLINIC | Age: 72
End: 2017-12-21
Payer: MEDICARE

## 2017-12-21 PROCEDURE — 97140 MANUAL THERAPY 1/> REGIONS: CPT

## 2017-12-21 PROCEDURE — 97110 THERAPEUTIC EXERCISES: CPT

## 2017-12-22 ENCOUNTER — APPOINTMENT (OUTPATIENT)
Dept: PHYSICAL THERAPY | Facility: CLINIC | Age: 72
End: 2017-12-22
Payer: MEDICARE

## 2017-12-22 PROCEDURE — 97110 THERAPEUTIC EXERCISES: CPT

## 2017-12-22 PROCEDURE — 97140 MANUAL THERAPY 1/> REGIONS: CPT

## 2017-12-26 ENCOUNTER — APPOINTMENT (OUTPATIENT)
Dept: PHYSICAL THERAPY | Facility: CLINIC | Age: 72
End: 2017-12-26
Payer: MEDICARE

## 2017-12-26 PROCEDURE — G8985 CARRY GOAL STATUS: HCPCS

## 2017-12-26 PROCEDURE — 97140 MANUAL THERAPY 1/> REGIONS: CPT

## 2017-12-26 PROCEDURE — G8984 CARRY CURRENT STATUS: HCPCS

## 2017-12-26 PROCEDURE — 97110 THERAPEUTIC EXERCISES: CPT

## 2017-12-28 ENCOUNTER — APPOINTMENT (OUTPATIENT)
Dept: PHYSICAL THERAPY | Facility: CLINIC | Age: 72
End: 2017-12-28
Payer: MEDICARE

## 2017-12-29 ENCOUNTER — APPOINTMENT (OUTPATIENT)
Dept: PHYSICAL THERAPY | Facility: CLINIC | Age: 72
End: 2017-12-29
Payer: MEDICARE

## 2017-12-29 PROCEDURE — 97140 MANUAL THERAPY 1/> REGIONS: CPT

## 2017-12-29 PROCEDURE — 97110 THERAPEUTIC EXERCISES: CPT

## 2018-01-02 ENCOUNTER — APPOINTMENT (OUTPATIENT)
Dept: PHYSICAL THERAPY | Facility: CLINIC | Age: 73
End: 2018-01-02
Payer: MEDICARE

## 2018-01-02 PROCEDURE — G8985 CARRY GOAL STATUS: HCPCS

## 2018-01-02 PROCEDURE — G8984 CARRY CURRENT STATUS: HCPCS

## 2018-01-02 PROCEDURE — 97110 THERAPEUTIC EXERCISES: CPT

## 2018-01-02 PROCEDURE — 97140 MANUAL THERAPY 1/> REGIONS: CPT

## 2018-01-03 ENCOUNTER — ALLSCRIPTS OFFICE VISIT (OUTPATIENT)
Dept: OTHER | Facility: OTHER | Age: 73
End: 2018-01-03

## 2018-01-03 DIAGNOSIS — M75.122 COMPLETE ROTATOR CUFF TEAR OF LEFT SHOULDER: ICD-10-CM

## 2018-01-03 DIAGNOSIS — Z98.890 OTHER SPECIFIED POSTPROCEDURAL STATES: ICD-10-CM

## 2018-01-03 DIAGNOSIS — M75.82 OTHER SHOULDER LESIONS, LEFT SHOULDER: ICD-10-CM

## 2018-01-04 ENCOUNTER — APPOINTMENT (OUTPATIENT)
Dept: PHYSICAL THERAPY | Facility: CLINIC | Age: 73
End: 2018-01-04
Payer: MEDICARE

## 2018-01-04 NOTE — PROGRESS NOTES
Assessment   1  History of repair of left rotator cuff (V15 29) (Y10 232)    Discussion/Summary      Xiomara is doing well 7 weeks status post a left shoulder arthroscopic rotator cuff repair and subacromial decompression  She was given a new prescription for physical therapy to continue 2 times per week  She needs to continue with her home exercise program as well  The focus should be regaining range of motion at this point  We will continue to monitor her left wrist and hand swelling and ensure regain of range of motion, as this is her dominant hand  She should return in 6 weeks for clinical re-evaluation  The treatment plan was reviewed with the patient/guardian  The patient/guardian understands and agrees with the treatment plan      Chief Complaint   7 weeks post op left shoulder arthroscopic RTC repair      Post-Op   Post-Op Shoulder:      XIOMARA FRANCISCO is status post arthroscopic rotator cuff repair and subacromial decompression  HPI: The patient reports no excessive pain-- and-- no fever  PE: The surgical incision site was healed  The shoulder demonstrates tenderness, but-- no warmth,-- no induration,-- no erythema,-- no ecchymosis-- and-- no swelling  ROM exam shows restricted 25 degrees active internal rotation,-- restricted 80 degrees active abduction,-- restricted 40 degrees active external rotation-- and-- restricted 100 degrees active flexion  The patient is progressing well with ROM  Strength as expected given post-op status  Peripheral neurovascular exam reveals intact sensation to light touch-- and-- intact gross motor function  Assessment: Post-op, the patient is progressing slowly, but-- is showing no signs of infection  Plan: Activity Restrictions: Advance as tolerated,-- no wieght bearing-- and-- per protocol  PT Referral:  I hereby certify that these services are medically necessary       HPI: Hanna Iyer is presenting 7 weeks after undergoing a left shoulder arthroscopic rotator cuff repair with subacromial decompression  She has been working with physical therapy 3 times a week and performing her home exercise program 3 times a day  She discontinue the sling a few weeks ago at the direction of her therapist  She denies any new injuries or symptoms  She continues to have discomfort in the left shoulder with movement, but feels that her movement has improved  She denies any paresthesias, but notes that she continues to have left thumb pain and inability to fully extend all of her digits due to edema  This has been improving since surgery but not fully recover  Review of Systems        Constitutional: No fever, no chills, feels well, no tiredness, no recent weight gain or loss  Eyes: No complaints of eyesight problems, no red eyes  ENT: no loss of hearing, no nosebleeds, no sore throat  Cardiovascular: No complaints of chest pain, no palpitations, no leg claudication or lower extremity edema  Respiratory: no compliants of shortness of breath, no wheezing, no cough  Gastrointestinal: no complaints of abdominal pain, no constipation, no nausea or diarrhea, no vomiting, no bloody stools  Genitourinary: no complaints of dysuria, no incontinence  Musculoskeletal: joint swelling  Integumentary: no complaints of skin rash or lesion, no itching or dry skin, no skin wounds  Neurological: no complaints of headache, no confusion, no numbness or tingling, no dizziness  Endocrine: No complaints of muscle weakness, no feelings of weakness, no frequent urination, no excessive thirst       Psychiatric: No suicidal thoughts, no anxiety, no feelings of depression  ROS reviewed  Active Problems   1  Abdominal pain (789 00) (R10 9)   2  Abnormal glucose (790 29) (R73 09)   3  Acute upper respiratory infection (465 9) (J06 9)   4  Adenomatous colon polyp (211 3) (D12 6)   5  Adult hypothyroidism (244 9) (E03 9)   6   Allergic rhinitis (477 9) (J30 9)   7  Angina pectoris (413 9) (I20 9)   8  Ankle pain, unspecified laterality   9  Anxiety disorder (300 00) (F41 9)   10  Arteriosclerotic coronary artery disease (414 00) (I25 10)   11  Arthritis (716 90) (M19 90)   12  Atypical chest pain (786 59) (R07 89)   13  Atypical face pain (350 2) (G50 1)   14  Benign essential hypertension (401 1) (I10)   15  Biceps tendinitis of right shoulder (726 12) (M75 21)   16  Bladder disorder (596 9) (N32 9)   17  Bloating (787 3) (R14 0)   18  Bursitis of hip (726 5) (M70 70)   19  Bursitis, subacromial (726 19) (M75 50)   20  Cervical neuralgia (723 4) (M54 12)   21  Cervical radiculopathy (723 4) (M54 12)   22  Cervical spinal stenosis (723 0) (M48 02)   23  Cervicalgia (723 1) (M54 2)   24  Change in stool habits (787 99) (R19 4)   25  Chronic cystitis (595 2) (N30 20)   26  Closed Fracture Of The Head Of The Right Talus (825 21)   27  Cloudy urine (791 9) (R82 90)   28  Colonoscopy (Fiberoptic) Screening   29  Complete rotator cuff tear of left shoulder (727 61) (M75 122)   30  Constipation (564 00) (K59 00)   31  Cough (786 2) (R05)   32  Cough variant asthma (493 82) (J45 991)   33  Depression (311) (F32 9)   34  Diarrhea (787 91) (R19 7)   35  Diverticulitis of colon (562 11) (K57 32)   36  Diverticulosis (562 10) (K57 90)   37  Dizziness (780 4) (R42)   38  Dysuria (788 1) (R30 0)   39  Encounter for screening mammogram for malignant neoplasm of breast (V76 12)      (Z12 31)   40  Family history of colon cancer (V16 0) (Z80 0)   41  Fatigue (780 79) (R53 83)   42  Fever (780 60) (R50 9)   43  Fibromyalgia (729 1) (M79 7)   44  Fracture Of The Calcaneus (825 0)   45  Gastric ulcer (531 90) (K25 9)   46  GERD without esophagitis (530 81) (K21 9)   47  Greater trochanteric bursitis of right hip (726 5) (M70 61)   48  Hammer toe, unspecified laterality (735 4) (M20 40)   49  Heart burn (787 1) (R12)   50  Hiatal hernia (553 3) (K44 9)   51   Hip pain, right (719 45) (M25 551)   52  History of colon polyps (V12 72) (Z86 010)   53  Hyperlipidemia (272 4) (E78 5)   54  Hypothyroidism (244 9) (E03 9)   55  Impacted cerumen, unspecified laterality (380 4) (H61 20)   56  Impaired memory (780 93) (R41 3)   57  Internal Hemorrhoids With Complication (375 6)   58  Irritable bowel syndrome (564 1) (K58 9)   59  Localized osteoarthritis of hip (715 35) (M16 9)   60  Malaise (780 79) (R53 81)   61  Mononeuritis (355 9) (G58 9)   62  Motion Sickness (994 6)   63  Murmur (785 2) (R01 1)   64  Nausea (787 02) (R11 0)   65  Neck pain (723 1) (M54 2)   66  Need for influenza vaccination (V04 81) (Z23)   67  Nonvenomous Insect Bite (E906 4)   68  NSAID long-term use (V58 64) (Z79 1)   69  Partial tear of rotator cuff (840 4) (M75 110)   70  Peristernal perichondritis (733 6) (M94 0)   71  Peritonitis (567 9)   72  Primary osteoarthritis of right hip (715 15) (M16 11)   73  Pruritus ani (698 0) (L29 0)   74  Right shoulder pain (719 41) (M25 511)   75  Rotator cuff tendonitis, left (726 10) (M75 82)   76  Screening for diabetes mellitus (V77 1) (Z13 1)   77  Shakiness (781 0) (R25 1)   78  Shoulder arthritis (716 91) (M19 019)   79  Skin disorder (709 9) (L98 9)   80  Skin rash (782 1) (R21)   81  Sleep disorder (780 50) (G47 9)   82  Spasm of muscle (728 85) (M62 838)   83  Spinal stenosis (724 00) (M48 00)   84  Spondylosis of lumbar region without myelopathy or radiculopathy (721 3) (M47 816)   85  Tendinitis of left rotator cuff (726 10) (M75 82)   86  Thyroid disorder (246 9) (E07 9)   87  Tinea corporis (110 5) (B35 4)   88  Upper back pain (724 5) (M54 9)   89  Urinary frequency (788 41) (R35 0)   90   Urinary tract infection (599 0) (N39 0)    Social History    · Denied: History of Alcohol Use (History)   · Daily Coffee Consumption (___ Cups/Day)   · Denied: History of Drug Use   · Marital History - Currently    · Never A Smoker   · Occupation: Retired   · History of Sodomised   · Prior anal penetration without consent   · Stopped Drinking Alcohol   · 9 years  The social history was reviewed and updated today  The social history was reviewed and is unchanged  Current Meds    1  Acidophilus Oral Capsule; TAKE AS DIRECTED; Therapy: (Recorded:07Mar2017) to Recorded   2  Aspirin 81 MG TABS; 1 every day; Therapy: 23XIL0561 to  Requested for: 73GVN6192 Recorded   3  Atorvastatin Calcium 20 MG Oral Tablet; TAKE 1 TABLET DAILY AS DIRECTED; Therapy: 99VSV3639 to (Renew:07Nov2017)  Requested for: 89AVY3886; Last     Rx:83Wot7602 Ordered   4  Colace CAPS; take 1 capsule daily; Therapy: (21 265 ) to Recorded   5  Daily Multivitamin TABS; TAKE 1 TABLET DAILY; Therapy: (21 265 ) to Recorded   6  Melatonin 3 MG Oral Tablet; TAKE AS DIRECTED; Therapy: (21 265 ) to Recorded   7  Potassium 99 MG Oral Tablet; TAKE 1 TABLET DAILY; Therapy: (Recorded:07Jul2014) to Recorded   8  ProAir  (90 Base) MCG/ACT Inhalation Aerosol Solution; INHALE 1 TO 2 PUFFS     EVERY 6 HOURS AS NEEDED; Therapy: 24Apr2014 to (Evaluate:33Czp4795)  Requested for: [phone]; Last     Rx:27Apr2015 Ordered   9  Restasis 0 05 % Ophthalmic Emulsion; INSTILL 1 DROP IN [facility] EYE TWICE DAILY; Therapy: 21Apr2015 to Recorded   10  Synthroid 100 MCG Oral Tablet; TAKE 1 TABLET DAILY AS DIRECTED; Therapy: (Recorded:07Mar2017) to Recorded     The medication list was reviewed and updated today  Allergies   1  ASPIRIN   2  Codeine   3  Flexeril   4  Adhesive Tape TAPE   5  Iodinated Contrast Media   6  Pneumovax 23 INJ   7  Reglan  8  IV Dye   9   IVP Dye    Physical Exam        Constitutional - General appearance: Normal       Musculoskeletal - Gait and station: Normal       Cardiovascular - Pulses: Normal -- Examination of extremities for edema and/or varicosities: Normal       Neurologic - Sensation: Normal -- Lower extremity peripheral neuro exam: Normal       Psychiatric - Orientation to person, place, and time: Normal -- Mood and affect: Normal       Eyes      Conjunctiva and lids: Normal        Free Text - Musculoskeletal: Examination of her left shoulder reveals well-healed arthroscopic incision sites  There is no erythema, ecchymosis, or edema  She is tender over the [facility] joint and deltoid  She is able to actively flex to 100Â°, abduct to approximately 80Â°, internally rotate to 25Â°, externally rotate to about 40Â°  She has discomfort at the end range of all motion  She does have scapulothoracic compensation with active motion  She is grossly distally neurovascularly unchanged in the left upper extremity  There is some minor edema about the left wrist and hand  Attending Note   Collaborating Physician Note: Collaborating Physician: I interviewed and examined the patient,-- I supervised the Advanced Practitioner-- and-- I agree with the Advanced Practitioner note  Future Appointments      Date/Time Provider Specialty Site   02/12/2018 10:00 AM AGUILAR Pulido   Orthopedic Surgery [facility]    Electronically signed by : Rolan Weems, [facility]; [place]  3 2018 10:53AM EST                       (Author)     Electronically signed by : AGUILAR Farrell ; [place]  3 2018  2:54PM EST                       (Author)

## 2018-01-05 ENCOUNTER — APPOINTMENT (OUTPATIENT)
Dept: PHYSICAL THERAPY | Facility: CLINIC | Age: 73
End: 2018-01-05
Payer: MEDICARE

## 2018-01-05 PROCEDURE — 97140 MANUAL THERAPY 1/> REGIONS: CPT

## 2018-01-05 PROCEDURE — 97530 THERAPEUTIC ACTIVITIES: CPT

## 2018-01-08 ENCOUNTER — APPOINTMENT (OUTPATIENT)
Dept: PHYSICAL THERAPY | Facility: CLINIC | Age: 73
End: 2018-01-08
Payer: MEDICARE

## 2018-01-08 PROCEDURE — 97140 MANUAL THERAPY 1/> REGIONS: CPT

## 2018-01-08 PROCEDURE — 97110 THERAPEUTIC EXERCISES: CPT

## 2018-01-12 ENCOUNTER — APPOINTMENT (OUTPATIENT)
Dept: PHYSICAL THERAPY | Facility: CLINIC | Age: 73
End: 2018-01-12
Payer: MEDICARE

## 2018-01-12 PROCEDURE — 97140 MANUAL THERAPY 1/> REGIONS: CPT

## 2018-01-12 PROCEDURE — 97110 THERAPEUTIC EXERCISES: CPT

## 2018-01-13 VITALS
HEART RATE: 106 BPM | HEIGHT: 60 IN | BODY MASS INDEX: 29.45 KG/M2 | WEIGHT: 150 LBS | DIASTOLIC BLOOD PRESSURE: 72 MMHG | SYSTOLIC BLOOD PRESSURE: 183 MMHG

## 2018-01-13 VITALS — SYSTOLIC BLOOD PRESSURE: 122 MMHG | RESPIRATION RATE: 16 BRPM | DIASTOLIC BLOOD PRESSURE: 76 MMHG

## 2018-01-13 VITALS
OXYGEN SATURATION: 98 % | RESPIRATION RATE: 18 BRPM | BODY MASS INDEX: 29.06 KG/M2 | HEART RATE: 83 BPM | DIASTOLIC BLOOD PRESSURE: 82 MMHG | TEMPERATURE: 98 F | HEIGHT: 60 IN | SYSTOLIC BLOOD PRESSURE: 142 MMHG | WEIGHT: 148 LBS

## 2018-01-14 VITALS
WEIGHT: 142.25 LBS | DIASTOLIC BLOOD PRESSURE: 69 MMHG | HEIGHT: 60 IN | HEART RATE: 96 BPM | BODY MASS INDEX: 27.93 KG/M2 | SYSTOLIC BLOOD PRESSURE: 137 MMHG

## 2018-01-14 VITALS — HEART RATE: 93 BPM | SYSTOLIC BLOOD PRESSURE: 138 MMHG | DIASTOLIC BLOOD PRESSURE: 76 MMHG

## 2018-01-15 ENCOUNTER — APPOINTMENT (OUTPATIENT)
Dept: PHYSICAL THERAPY | Facility: CLINIC | Age: 73
End: 2018-01-15
Payer: MEDICARE

## 2018-01-15 PROCEDURE — 97110 THERAPEUTIC EXERCISES: CPT

## 2018-01-15 PROCEDURE — 97140 MANUAL THERAPY 1/> REGIONS: CPT

## 2018-01-16 NOTE — RESULT NOTES
Discussion/Summary    Colon polyps removed came back as benign  Next colonoscopy in 3 years    DISCUSSED WITH PATIENT  REMINDER SET  CS         Verified Results  (1) TISSUE EXAM [id number] 10:10AM Charisse Cordial     Test Name Result Flag Reference   LAB AP CASE REPORT (Report)     Surgical Pathology Report             Case: [id number]                   Authorizing Provider: Tito Rangel MD     Collected:      10/23/2017 1010        Ordering Location:   [facility]    Received:      10/23/2017 [address]                            Pathologist:      Gregg Barrientos MD                             Specimen:  Polyp, Colorectal, rectal polyp cold forcep   LAB AP FINAL DIAGNOSIS      A  Colon, rectal polyp, biopsy:  - Hyperplastic polyp  Electronically signed by Gregg Barrientos MD on 10/24/2017 at 2:00 PM   LAB AP SURGICAL ADDITIONAL INFORMATION (Report)     All controls performed with the immunohistochemical stains reported above   reacted appropriately  These tests were developed and their performance   characteristics determined by [facility] or   [facility]  They may not be cleared or approved by the [place]  Food and Drug Administration  The FDA has determined that such clearance   or approval is not necessary  These tests are used for clinical purposes  They should not be regarded as investigational or for research  This   laboratory has been approved by CLIA 88, designated as a high-complexity   laboratory and is qualified to perform these tests  Interpretation performed at [facility]   LAB AP GROSS DESCRIPTION (Report)     A   The specimen is received in formalin, labeled with the patient's name   and hospital number, and is designated rectal polyp cold forceps, are   two irregularly shaped fragments of tan soft tissue each measuring 0 2 cm   in greatest dimension  Entirely submitted  One cassette  Note: The estimated total formalin fixation time based upon information   provided by the submitting clinician and the standard processing schedule   is less than 72 hours      RLR

## 2018-01-18 ENCOUNTER — APPOINTMENT (OUTPATIENT)
Dept: PHYSICAL THERAPY | Facility: CLINIC | Age: 73
End: 2018-01-18
Payer: MEDICARE

## 2018-01-19 ENCOUNTER — APPOINTMENT (OUTPATIENT)
Dept: PHYSICAL THERAPY | Facility: CLINIC | Age: 73
End: 2018-01-19
Payer: MEDICARE

## 2018-01-19 PROCEDURE — 97140 MANUAL THERAPY 1/> REGIONS: CPT

## 2018-01-19 PROCEDURE — 97110 THERAPEUTIC EXERCISES: CPT

## 2018-01-22 ENCOUNTER — APPOINTMENT (OUTPATIENT)
Dept: PHYSICAL THERAPY | Facility: CLINIC | Age: 73
End: 2018-01-22
Payer: MEDICARE

## 2018-01-22 VITALS
HEIGHT: 60 IN | DIASTOLIC BLOOD PRESSURE: 70 MMHG | HEART RATE: 80 BPM | BODY MASS INDEX: 27.9 KG/M2 | WEIGHT: 142.13 LBS | SYSTOLIC BLOOD PRESSURE: 124 MMHG

## 2018-01-22 VITALS
WEIGHT: 140.25 LBS | HEIGHT: 60 IN | DIASTOLIC BLOOD PRESSURE: 69 MMHG | BODY MASS INDEX: 27.54 KG/M2 | HEART RATE: 93 BPM | SYSTOLIC BLOOD PRESSURE: 126 MMHG

## 2018-01-22 PROCEDURE — 97140 MANUAL THERAPY 1/> REGIONS: CPT

## 2018-01-26 ENCOUNTER — OFFICE VISIT (OUTPATIENT)
Dept: PHYSICAL THERAPY | Facility: CLINIC | Age: 73
End: 2018-01-26
Payer: MEDICARE

## 2018-01-26 DIAGNOSIS — M71.9 DISORDER OF BURSAE OF LEFT SHOULDER REGION: Primary | ICD-10-CM

## 2018-01-26 PROCEDURE — 97110 THERAPEUTIC EXERCISES: CPT

## 2018-01-26 PROCEDURE — 97140 MANUAL THERAPY 1/> REGIONS: CPT

## 2018-01-26 NOTE — PROGRESS NOTES
Daily Note     Today's date: 2018  Patient name: Hiral Ellington  : 1945  MRN: [id number]  Referring provider: Benigno Ordonez MD  Dx:   Encounter Diagnosis   Name Primary?  Disorder of bursae of left shoulder region Yes                  Subjective: Pt reports left hand has been sore  Shoulder is sore but hand is worse  Reports left shoulder pain = 5/10      Objective: See treatment diary below  There ex: 10 min  Rows with blue tubing 3 x 10  Lat pull downs with blue tubing 3 x 10  Scaption 2 x 10 2lbs  Overhead Press 1 lb 2 x 10  Pulleys 10 x 5 sec    Manual: 15 min  IR/ER with manual resist/ rhthmic stab  RS at 100 deg elevation  PROM in all directions    Assessment: Tolerated treatment well  Patient could benefit from continued PT      Plan: Continue per plan of care

## 2018-01-29 ENCOUNTER — OFFICE VISIT (OUTPATIENT)
Dept: PHYSICAL THERAPY | Facility: CLINIC | Age: 73
End: 2018-01-29
Payer: MEDICARE

## 2018-01-29 DIAGNOSIS — M71.9 DISORDER OF BURSAE OF LEFT SHOULDER REGION: Primary | ICD-10-CM

## 2018-01-29 PROCEDURE — 97110 THERAPEUTIC EXERCISES: CPT

## 2018-01-29 PROCEDURE — 97140 MANUAL THERAPY 1/> REGIONS: CPT

## 2018-01-29 NOTE — PROGRESS NOTES
Daily Note     Today's date: 2018  Patient name: Marlo Hamm  : 1945  MRN: [id number]  Referring provider: Teresita Freeman MD  Dx:   Encounter Diagnosis   Name Primary?  Disorder of bursae of left shoulder region Yes                  Subjective: Pt reports no new c/o  Reports left shoulder pain = 5/10      Objective: See treatment diary below  There ex: 10 min  Rows with blue tubing 3 x 10  Lat pull downs with blue tubing 3 x 10  Scaption 2 x 10 2lbs  Overhead Press 1 lb 2 x 10  Pulleys 10 x 5 sec    Manual: 15 min  IR/ER with manual resist/ rhthmic stab  RS at 100 deg elevation  PROM in all directions    Assessment: Tolerated treatment well  Patient could benefit from continued PT      Plan: Continue per plan of care

## 2018-02-01 ENCOUNTER — OFFICE VISIT (OUTPATIENT)
Dept: PHYSICAL THERAPY | Facility: CLINIC | Age: 73
End: 2018-02-01
Payer: MEDICARE

## 2018-02-01 DIAGNOSIS — M71.9 DISORDER OF BURSAE OF LEFT SHOULDER REGION: Primary | ICD-10-CM

## 2018-02-01 PROCEDURE — 97110 THERAPEUTIC EXERCISES: CPT

## 2018-02-01 PROCEDURE — 97140 MANUAL THERAPY 1/> REGIONS: CPT

## 2018-02-01 NOTE — PROGRESS NOTES
Daily Note     Today's date: 2018  Patient name: Dorothea Youngblood  : 1945  MRN: [id number]  Referring provider: Ela Bañuelos MD  Dx:   Encounter Diagnosis   Name Primary?  Disorder of bursae of left shoulder region Yes                  Subjective: Pt reports no new c/o  Reports left shoulder pain = 5/10      Objective: See treatment diary below  There ex: 10 min  Rows with blue tubing 3 x 10  Lat pull downs with blue tubing 3 x 10  Scaption 2 x 10 2lbs  Overhead Press 1 lb 2 x 10  Pulleys 10 x 5 sec    Manual: 15 min  IR/ER with manual resist/ rhthmic stab  RS at 100 deg elevation  PROM in all directions    Assessment: Tolerated treatment well  Patient could benefit from continued PT      Plan: Continue per plan of care

## 2018-02-05 ENCOUNTER — APPOINTMENT (OUTPATIENT)
Dept: PHYSICAL THERAPY | Facility: CLINIC | Age: 73
End: 2018-02-05
Payer: MEDICARE

## 2018-02-08 ENCOUNTER — OFFICE VISIT (OUTPATIENT)
Dept: PHYSICAL THERAPY | Facility: CLINIC | Age: 73
End: 2018-02-08
Payer: MEDICARE

## 2018-02-08 DIAGNOSIS — M71.9 DISORDER OF BURSAE OF LEFT SHOULDER REGION: Primary | ICD-10-CM

## 2018-02-08 PROCEDURE — 97140 MANUAL THERAPY 1/> REGIONS: CPT | Performed by: PHYSICAL THERAPIST

## 2018-02-08 PROCEDURE — 97110 THERAPEUTIC EXERCISES: CPT | Performed by: PHYSICAL THERAPIST

## 2018-02-08 NOTE — PROGRESS NOTES
Daily Note     Today's date: 2018  Patient name: Gonzales Cleveland  : 1945  MRN: [id number]  Referring provider: Loretta Wilson MD  Dx:   Encounter Diagnosis   Name Primary?  Disorder of bursae of left shoulder region Yes                  Subjective:  Reports left shoulder pain = 4/10      Objective: See treatment diary below  There ex: 22  Rows with black tubing 4 x 10  Lat pull downs with black tubing 3 x 10  Scaption 2 x 10 2lbs  Overhead Press 1 lb 2 x 10  Pulleys 30 x 5 sec  Wand IR and EXT behind back 2 x 10  Wand flex in supine   2 x 10    Manual: 15 min  ST joint mobs into retraction and upward rotation  IR/ER with manual resist/ rhthmic stab  STM to Left UT  PROM in all directions    Ended with MH to Left shld x 5 min with patient seated  Assessment:  She had no pain following treatment  She has functional ROM with end range restrictions into ER and Flex  Plan: Continue per plan of care

## 2018-02-12 ENCOUNTER — OFFICE VISIT (OUTPATIENT)
Dept: OBGYN CLINIC | Facility: CLINIC | Age: 73
End: 2018-02-12
Payer: MEDICARE

## 2018-02-12 VITALS
HEIGHT: 60 IN | HEART RATE: 83 BPM | SYSTOLIC BLOOD PRESSURE: 131 MMHG | DIASTOLIC BLOOD PRESSURE: 81 MMHG | WEIGHT: 148 LBS | BODY MASS INDEX: 29.06 KG/M2

## 2018-02-12 DIAGNOSIS — M75.122 COMPLETE ROTATOR CUFF TEAR OF LEFT SHOULDER: Primary | ICD-10-CM

## 2018-02-12 PROCEDURE — 99213 OFFICE O/P EST LOW 20 MIN: CPT | Performed by: ORTHOPAEDIC SURGERY

## 2018-02-12 NOTE — PATIENT INSTRUCTIONS
Rotator Cuff Injury   WHAT YOU NEED TO KNOW:   What is a rotator cuff injury? A rotator cuff injury is damage to the muscles or tendons of your rotator cuff  A tendon is a cord of tough tissue that connects your muscles to your bones  The rotator cuff is made up of a group of muscles and tendons that hold the shoulder joint in place  The damage may include stretching of your muscle or tears in the tendons  It may also include inflammation of the bursa (small sack of fluid around the joint)  What causes a rotator cuff injury? · Wear and tear of the tendons: Your tendons get weaker as you get older  · Impingement: This happens when the bone of your upper arm presses on the tendon of your shoulder when you lift your arm  Impingement causes pain and inflammation that weaken your rotator cuff  · Overuse or injury:  Heavy lifting, throwing, or overuse may damage the rotator cuff  Sports such as baseball and tennis may injure your rotator cuff when your arm goes over your head  A fall or other shoulder injury may also damage your rotator cuff  What are the signs and symptoms of a rotator cuff injury? · Pain or stiffness: You may feel pain in your shoulder that travels down your arm  You may feel pain all of the time or only sometimes  You may feel pain when you lie on the side of your injured shoulder  · Decreased range of motion:  You may have trouble lifting your arm or placing it behind your back  It may also be hard to use your shoulder  If you have a severe injury, you may not be able to move your shoulder at all  · Tenderness or swelling: Your shoulder area may swell and be painful to the touch  · Numbness: You may lose feeling in part or all of your arm  This is more common in athletes who throw as part of their sport, such as baseball pitchers  · A popping noise: You may hear this noise and feel pain when you lift your arm  How is a rotator cuff injury diagnosed?   Your healthcare provider will ask if you have had an injury or surgery on your shoulder  He will examine your shoulder, and test the strength and movement of your arm  You may need other tests to show what is causing your symptoms  · Ultrasound: An ultrasound uses sound waves to show pictures on a monitor  An ultrasound may be done to show fluid or swelling around your rotator cuff  · X-ray:  An x-ray may show injury to the bones and tissues in your shoulder  · CT scan: This test is also called a CAT scan  An x-ray machine uses a computer to take pictures of your shoulder  You may be given a dye before the pictures are taken to help healthcare providers see the pictures better  Tell the healthcare provider if you have ever had an allergic reaction to contrast dye  · MRI:  This scan uses powerful magnets and a computer to take pictures of your shoulder  An MRI may show damage to your muscles or tendons  You may be given dye to help the pictures show up better  Tell the healthcare provider if you have ever had an allergic reaction to contrast dye  Do not enter the MRI room with any metal  Metal can cause serious injury  Tell the healthcare provider if you have any metal in or on your body  How is a rotator cuff injury treated? · Medicines:      ¨ Acetaminophen: This medicine decreases pain  Acetaminophen is available without a doctor's order  Ask how much to take and how often to take it  Follow directions  Acetaminophen can cause liver damage if not taken correctly  ¨ NSAIDs:  These medicines decrease swelling, pain, and fever  NSAIDs are available without a doctor's order  Ask your healthcare provider which medicine is right for you  Ask how much to take and when to take it  Take as directed  NSAIDs can cause stomach bleeding or kidney problems if not taken correctly  ¨ Pain medicine: You may be given a prescription medicine to decrease pain   Do not wait until the pain is severe before you take this medicine  ¨ Steroids: This medicine may be injected into the rotator cuff area to decrease inflammation and pain  · Physical therapy:  A physical therapist can teach you exercises to help improve movement and strength, and to decrease pain  These exercises may help you go back to your usual activities or return to playing sports  · Surgery: You may need surgery if your injury is severe or your symptoms do not improve  You may also need surgery to decrease your signs and symptoms if other treatments have not worked  ¨ Debridement and repair: This surgery is done for partial or full tears of your rotator cuff  Your healthcare provider will clean away damaged tissue and fix your tear  ¨ Tendon transfer and graft: This surgery is done for badly torn rotator cuffs that cannot be repaired easily  Your healthcare provider will use a piece of another tissue or muscle to fix the torn tendon  ¨ Arthrodesis: This surgery is to reshape the bone of your shoulder joint so it stays in place  This is done if the muscles of your rotator cuff are not working  ¨ Arthroplasty:  During this surgery, an artificial joint made of metal and plastic is put into your shoulder  This surgery may be done if the rotator cuff cannot be fixed, or if your pain and swelling do not go away  How can I care for my rotator cuff injury at home? · Rest:  Rest may help your shoulder heal  Overuse of your shoulder can make your injury worse  Avoid heavy lifting, using your arms over your head, or any other activity that makes the pain worse  · Put ice or heat on your shoulder:  Use ice on your shoulder every few hours for the first several days  This may help decrease pain and swelling  After the first several days, a heating pad may help relax the muscles in your shoulder  When should I contact my healthcare provider?    · The pain in your shoulder or arm is not improving, or is worse than before you started treatment  · You have new pain in your neck  · You have a fever  · You have questions or concerns about your condition or care  When should I seek immediate care or call 911? · You suddenly cannot move your arm  · You have severe stomach or back pain, are vomiting blood, or have black bowel movements  CARE AGREEMENT:   You have the right to help plan your care  Learn about your health condition and how it may be treated  Discuss treatment options with your caregivers to decide what care you want to receive  You always have the right to refuse treatment  The above information is an  only  It is not intended as medical advice for individual conditions or treatments  Talk to your doctor, nurse or pharmacist before following any medical regimen to see if it is safe and effective for you  © 2017 [employer]  Information is for End User's use only and may not be sold, redistributed or otherwise used for commercial purposes  All illustrations and images included in CareNotes® are the copyrighted property of [employer]  or [employer]

## 2018-02-12 NOTE — PROGRESS NOTES
H&P Exam - Orthopedics   Monica Chandra 67 y o  female MRN: [id number]  Unit/Bed#:  Encounter: [id number]    Assessment/Plan     Assessment:  1  Complete rotator cuff tear of left shoulder  Ambulatory referral to Physical Therapy       Plan:  Ernestina Smith looks good upon examination today  Her ROM and strength is as expected given her procedure, and time in physical therapy  Her scars have healed well  She is experiencing some difficulty with extension of her left fingers  She states that this occurred after her surgery  I would like her to continue with formal therapy for her shoulder as per protocol  Additionally, I put instructions for physical therapy to add exercises to increase her lack of finger extension  I also provided and instructed Ernestina Smith on home exercise sheet for her hand  I would like to see Ernestina Smith back in 6 weeks for re-evaluation  Scribe Attestation    I,:   Radha Richardson am acting as a scribe while in the presence of the attending physician :        I,:   Jyoti Self MD personally performed the services described in this documentation    as scribed in my presence :            History of Present Illness   HPI:  Moncia Chandra is a 67 y o  female who presents 80 days status post op of left shoulder arthroscopy with subacromial decompression and rotator cuff repair  She notes that she is still sore at the [facility] joint, but over all is feeling much better  She notes an intermittent and mild radiating dull ache about the anterolateral aspect of the shoulder with activities and is better at rest  She has noted additional swelling into the forearm, but has recently increased intensity at physical therapy, and does not note increased pain from the exercises  Ernestina Smith denies paresthesias at this time  Review of Systems   Constitutional: Negative  HENT: Negative  Eyes: Negative  Respiratory: Negative  Endocrine: Negative  Genitourinary: Negative      Musculoskeletal: Positive for arthralgias, joint swelling and myalgias  Allergic/Immunologic: Negative  Neurological: Negative  Hematological: Negative  Psychiatric/Behavioral: Positive for decreased concentration  The patient is nervous/anxious  Historical Information   Past Medical History:   Diagnosis Date    Ankle fracture, right     casted    Anxiety     Arthritis     Asthma     Bursitis     Constipation     Cystitis     chronic    Diverticulosis     Fibromyalgia     Fibromyalgia, primary     GERD (gastroesophageal reflux disease)     H/o Lyme disease     Hiatal hernia     History of Clostridium difficile infection     Hyperlipidemia     Hypothyroid     hypo    Labral tear of shoulder     left    Lichen sclerosus of female genitalia 2016    Murmur     Neck pain, chronic     gets steroid injections-none since late 2016    Neuralgia     Spinal stenosis     Ulcer ([address] )     Ulcer of gastric fundus     Wears glasses     Wears partial dentures     upper and lower     Past Surgical History:   Procedure Laterality Date    ABDOMINAL SURGERY      exploratory-removal of appendix    APPENDECTOMY      CARPAL TUNNEL RELEASE Bilateral     CHOLECYSTECTOMY      COLONOSCOPY      DILATION AND CURETTAGE OF UTERUS      x3 miscarriages    FOOT SURGERY Right     5th toe-hammertoe repair    HYSTERECTOMY      complete    LA COLONOSCOPY FLX DX W/COLLJ SPEC WHEN PFRMD N/A 10/23/2017    Procedure: EGD AND COLONOSCOPY;  Surgeon: Judy Serrano MD;  [facility];   Service: Gastroenterology    LA DAR Lizbeth Gonsalez Left 11/13/2017    Procedure: SHOULDER ARTHROSCOPY WITH SUBACROMIAL DECOMPRESSION, ROTATOR CUFF REPAIR;  Surgeon: Janeth Antonio MD;  Location: [facility] MAIN OR;  Service: Orthopedics    ROTATOR CUFF REPAIR Right     TONSILLECTOMY       Social History   History   Alcohol Use No     History   Drug Use No     History   Smoking Status    Never Smoker   Smokeless Tobacco    Never Used     Family History:   Family History   Problem Relation Age of Onset    Cancer Mother      colon    Alzheimer's disease Father     Heart disease Sister      MI x4-angioplasty x4 stents    Cancer Brother      brain tumor-age 6    Cancer Brother 72     prostate       Meds/Allergies   all medications and allergies reviewed  Allergies   Allergen Reactions    Omnipaque [Iohexol] Other (See Comments)     Shakes, "passed out"    Pneumovax 23 [Pneumococcal Vac Polyvalent] Hives    Iodine     Iv Dye  [Iodinated Diagnostic Agents] Confusion     Annotation - [id number]: shaking    Aspirin GI Intolerance and Nausea Only     Reaction Date: [id number]; Annotation - [id number]: 325mg causes bleeding  Cannot take a dose higher than 81mg-pt has a hx of ulcer    Codeine GI Intolerance     N/V    Flexeril [Cyclobenzaprine]      Unknown reaction per pt  Allergy was noted on physicians note    Latex Rash    Other Rash     Adhesive Tape-caused a rash       Objective   Vitals: Blood pressure 131/81, pulse 83, height 5' (1 524 m), weight 67 1 kg (148 lb), not currently breastfeeding  ,Body mass index is 28 9 kg/m²  [unfilled]    [unfilled]    Invasive Devices     Epidural Line            Nerve Block Catheter 11/13/17 90 days                Physical Exam   Constitutional: She is oriented to person, place, and time  She appears well-developed and well-nourished  HENT:   Head: Normocephalic and atraumatic  Eyes: Conjunctivae and EOM are normal    Neck: Normal range of motion  Cardiovascular: Normal rate  Pulmonary/Chest: Effort normal    Musculoskeletal:   As noted in HPI   Neurological: She is alert and oriented to person, place, and time  Skin: Skin is warm and dry  Psychiatric: She has a normal mood and affect  Her behavior is normal  Judgment and thought content normal      Left Shoulder Exam     Tenderness   The patient is experiencing tenderness in the acromioclavicular joint      Range of Motion Active Abduction: 120   Passive Abduction: 130   Forward Flexion: 130   Left shoulder external rotation: 45     Internal Rotation 0 degrees: Lumbar     Muscle Strength   Abduction: 4/5   Internal Rotation: 5/5   External Rotation: 5/5   Supraspinatus: 4/5   Biceps: 5/5     Other   Erythema: absent  Scars: present  Sensation: normal  Pulse: present             Lab Results:   Imaging:   EKG, Pathology, and Other Studies:     Code Status: [unfilled]  Advance Directive and Living Will: Yes    Power of :    POLST:

## 2018-02-13 ENCOUNTER — TELEPHONE (OUTPATIENT)
Dept: OBGYN CLINIC | Facility: HOSPITAL | Age: 73
End: 2018-02-13

## 2018-02-13 NOTE — TELEPHONE ENCOUNTER
Patient is calling wondering if she would be able to drive now with her shoulder injury  Patient forgot to ask at the appointment yesterday  Naheed rodriguez

## 2018-02-14 ENCOUNTER — OFFICE VISIT (OUTPATIENT)
Dept: PHYSICAL THERAPY | Facility: CLINIC | Age: 73
End: 2018-02-14
Payer: MEDICARE

## 2018-02-14 DIAGNOSIS — M71.9 DISORDER OF BURSAE OF LEFT SHOULDER REGION: ICD-10-CM

## 2018-02-14 DIAGNOSIS — M71.9 DISORDER OF BURSAE OF LEFT SHOULDER REGION: Primary | ICD-10-CM

## 2018-02-14 PROCEDURE — 97110 THERAPEUTIC EXERCISES: CPT

## 2018-02-14 PROCEDURE — 97140 MANUAL THERAPY 1/> REGIONS: CPT

## 2018-02-14 PROCEDURE — G8985 CARRY GOAL STATUS: HCPCS

## 2018-02-14 PROCEDURE — G8984 CARRY CURRENT STATUS: HCPCS

## 2018-02-14 NOTE — PROGRESS NOTES
Daily Note     Today's date: 2018  Patient name: Karolyn Ernandez  : 1945  MRN: [id number]  Referring provider: Sherrell Olson MD  Dx:   Encounter Diagnosis   Name Primary?  Disorder of bursae of left shoulder region Yes                  Subjective: Pt reports no new c/o  Reports left shoulder pain = 3/10  Reports saw MD last week and Dr Romeo Higginbotham would like to add exercises for left hand  Objective: See treatment diary below  There ex: 25 min  Rows with blue tubing 3 x 10  Lat pull downs with blue tubing 3 x 10  Scaption 2 x 10 2lbs  Overhead Press 2 lb 2 x 10  Pulleys 10 x 5 sec  Bicep Curls 4 lbs 2 x 20  Tricep Ext black tubing 2 x 12    Manual: 15 min  IR/ER with manual resist/ rhthmic stab  RS at 100 deg elevation  PROM in all directions    Left Shoulder   AROM  PROM  MMT   Flexion   110  150  3+  Abduction  80  90  3+  Int Rot   60  75  3+  Ext Rot  45  60  3+  Horiz AB    * Denotes Pain  Assessment: Tolerated treatment well  Patient could benefit from continued PT     Pt has achieved STG    LT weeks  Independent HEP  Increase AROM to [facility] Left Shoulder  Assess and treat left hand appropriately  Increase left shoulder MMT to 4/5  Pt to perform ADL's with less than 3/10 pain  Pt to lift 5 lbs overhead without pain or substitution using left UE      Plan: Continue per plan of care

## 2018-02-16 ENCOUNTER — OFFICE VISIT (OUTPATIENT)
Dept: PHYSICAL THERAPY | Facility: CLINIC | Age: 73
End: 2018-02-16
Payer: MEDICARE

## 2018-02-16 DIAGNOSIS — M71.9 DISORDER OF BURSAE OF LEFT SHOULDER REGION: Primary | ICD-10-CM

## 2018-02-16 PROCEDURE — 97110 THERAPEUTIC EXERCISES: CPT

## 2018-02-16 PROCEDURE — 97140 MANUAL THERAPY 1/> REGIONS: CPT

## 2018-02-16 NOTE — PROGRESS NOTES
Daily Note     Today's date: 2018  Patient name: Joan Pastor  : 1945  MRN: [id number]  Referring provider: Bozena Ybarra MD  Dx:   Encounter Diagnosis   Name Primary?  Disorder of bursae of left shoulder region Yes                  Subjective: Pt reports no new c/o  Reports left shoulder pain = 3/10  Objective: See treatment diary below  There ex: 25 min  Rows with blue tubing 3 x 10  Lat pull downs with blue tubing 3 x 10  Scaption 2 x 10 2lbs  Overhead Press 2 lb 2 x 10  Pulleys 10 x 5 sec  Bicep Curls 4 lbs 2 x 20  Tricep Ext black tubing 2 x 12  Finger extension Yellow web  and rubber bands (red and Blue) 20 x each    Manual: 15 min  IR/ER with manual resist/ rhthmic stab  RS at 100 deg elevation  PROM in all directions    Assessment: Tolerated treatment well  Patient could benefit from continued PT       Plan: Continue per plan of care

## 2018-02-19 ENCOUNTER — OFFICE VISIT (OUTPATIENT)
Dept: PHYSICAL THERAPY | Facility: CLINIC | Age: 73
End: 2018-02-19
Payer: MEDICARE

## 2018-02-19 DIAGNOSIS — M71.9 DISORDER OF BURSAE OF LEFT SHOULDER REGION: Primary | ICD-10-CM

## 2018-02-19 PROCEDURE — 97140 MANUAL THERAPY 1/> REGIONS: CPT

## 2018-02-19 PROCEDURE — 97110 THERAPEUTIC EXERCISES: CPT

## 2018-02-19 NOTE — PROGRESS NOTES
Daily Note     Today's date: 2018  Patient name: Abdirashid Morris  : 1945  MRN: [id number]  Referring provider: Sulma Patel MD  Dx:   Encounter Diagnosis   Name Primary?  Disorder of bursae of left shoulder region Yes                  Subjective: Pt reports no new c/o  Reports left shoulder pain = 4/10  Feels a little stiff this morning  Objective: See treatment diary below  There ex: 25 min  Rows with blue tubing 3 x 10  Lat pull downs with blue tubing 3 x 10  Scaption 2 x 10 2lbs  Overhead Press 2 lb 2 x 10  Pulleys 10 x 5 sec  Bicep Curls 4 lbs 2 x 20  Tricep Ext black tubing 2 x 12  Finger extension Yellow web  and rubber bands (red and Blue) 20 x each    Manual: 15 min  IR/ER with manual resist/ rhthmic stab  RS at 100 deg elevation  PROM in all directions    Assessment: Tolerated treatment well  Patient could benefit from continued PT       Plan: Continue per plan of care

## 2018-02-23 ENCOUNTER — OFFICE VISIT (OUTPATIENT)
Dept: PHYSICAL THERAPY | Facility: CLINIC | Age: 73
End: 2018-02-23
Payer: MEDICARE

## 2018-02-23 DIAGNOSIS — M71.9 DISORDER OF BURSAE OF LEFT SHOULDER REGION: Primary | ICD-10-CM

## 2018-02-23 PROCEDURE — 97140 MANUAL THERAPY 1/> REGIONS: CPT

## 2018-02-23 PROCEDURE — 97110 THERAPEUTIC EXERCISES: CPT

## 2018-02-23 NOTE — PROGRESS NOTES
Daily Note     Today's date: 2018  Patient name: Marlo Hamm  : 1945  MRN: [id number]  Referring provider: Teresita Freeman MD  Dx:   Encounter Diagnosis   Name Primary?  Disorder of bursae of left shoulder region Yes                  Subjective: Pt reports no new c/o  Reports left shoulder pain = 2/10  Objective: See treatment diary below  There ex: 20min  Rows with blue tubing 3 x 10  Lat pull downs with blue tubing 3 x 10  Scaption 2 x 10 2lbs  Overhead Press 2 lb 2 x 10  Pulleys 10 x 5 sec  Bicep Curls 4 lbs 2 x 20  Tricep Ext black tubing 2 x 12  Finger extension Yellow web  and rubber bands (red and Blue) 20 x each    Manual: 10min  IR/ER with manual resist/ rhthmic stab  RS at 100 deg elevation  PROM in all directions    Assessment: Tolerated treatment well  Patient could benefit from continued PT       Plan: Continue per plan of care

## 2018-02-26 ENCOUNTER — OFFICE VISIT (OUTPATIENT)
Dept: PHYSICAL THERAPY | Facility: CLINIC | Age: 73
End: 2018-02-26
Payer: MEDICARE

## 2018-02-26 DIAGNOSIS — M71.9 DISORDER OF BURSAE OF LEFT SHOULDER REGION: Primary | ICD-10-CM

## 2018-02-26 PROCEDURE — 97110 THERAPEUTIC EXERCISES: CPT

## 2018-02-26 PROCEDURE — 97140 MANUAL THERAPY 1/> REGIONS: CPT

## 2018-02-26 NOTE — PROGRESS NOTES
Daily Note     Today's date: 2018  Patient name: Sujit French  : 1945  MRN: [id number]  Referring provider: Sony Cho MD  Dx:   Encounter Diagnosis   Name Primary?  Disorder of bursae of left shoulder region Yes                 Session: 27  Last Re-Eval: 18    Subjective: Pt reports no new c/o  Reports left shoulder pain = 2/10  Objective: See treatment diary below  There ex: 20min  Rows with blue tubing 3 x 10  Lat pull downs with blue tubing 3 x 10  Scaption 2 x 10 2lbs  Overhead Press 2 lb 2 x 10  Pulleys 10 x 5 sec  Bicep Curls 4 lbs 2 x 20  Tricep Ext black tubing 2 x 12  Finger extension Yellow web  and rubber bands (red and Blue) 20 x each    Manual: 10min  IR/ER with manual resist/ rhthmic stab  RS at 100 deg elevation  PROM in all directions    Assessment: Tolerated treatment well  Patient could benefit from continued PT       Plan: Continue per plan of care

## 2018-03-01 ENCOUNTER — OFFICE VISIT (OUTPATIENT)
Dept: PHYSICAL THERAPY | Facility: CLINIC | Age: 73
End: 2018-03-01
Payer: MEDICARE

## 2018-03-01 DIAGNOSIS — M71.9 DISORDER OF BURSAE OF LEFT SHOULDER REGION: Primary | ICD-10-CM

## 2018-03-01 PROCEDURE — 97110 THERAPEUTIC EXERCISES: CPT

## 2018-03-01 PROCEDURE — 97140 MANUAL THERAPY 1/> REGIONS: CPT

## 2018-03-01 NOTE — PROGRESS NOTES
Daily Note     Today's date: 3/1/2018  Patient name: Antoinette Ybarra  : 1945  MRN: [id number]  Referring provider: Gregg Moreau MD  Dx:   Encounter Diagnosis   Name Primary?  Disorder of bursae of left shoulder region Yes                 Session: 28  Last Re-Eval: 18    Subjective: Pt reports no new c/o  Reports left shoulder pain = 2/10  Objective: See treatment diary below  There ex: 20min  Rows with Green tubing 2 x 20  Lat pull downs with Green tubing 2 x20  Scaption 2 x 10 2lbs  Overhead Press 2 lb 2 x 10  Pulleys 10 x 5 sec  Bicep Curls 5 lbs 2 x 20  Tricep Ext black tubing 2 x 12  Finger extension Red TB 2 x 20     Manual: 10min  IR/ER with manual resist/ rhthmic stab  RS at 100 deg elevation  PROM in all directions    Assessment: Tolerated treatment well  Patient could benefit from continued PT       Plan: Continue per plan of care

## 2018-03-08 ENCOUNTER — OFFICE VISIT (OUTPATIENT)
Dept: PHYSICAL THERAPY | Facility: CLINIC | Age: 73
End: 2018-03-08
Payer: MEDICARE

## 2018-03-08 DIAGNOSIS — M71.9 DISORDER OF BURSAE OF LEFT SHOULDER REGION: Primary | ICD-10-CM

## 2018-03-08 PROCEDURE — 97140 MANUAL THERAPY 1/> REGIONS: CPT

## 2018-03-08 PROCEDURE — 97110 THERAPEUTIC EXERCISES: CPT

## 2018-03-08 NOTE — PROGRESS NOTES
Daily Note     Today's date: 3/8/2018  Patient name: Suma Richter  : 1945  MRN: [id number]  Referring provider: Katharina Kapadia MD  Dx:   Encounter Diagnosis   Name Primary?  Disorder of bursae of left shoulder region Yes                 Session: 28  Last Re-Eval: 18    Subjective: Pt reports no new c/o  Reports left shoulder pain = 2/10  Objective: See treatment diary below  There ex: 20min  Rows with Green tubing 2 x 20  Lat pull downs with Green tubing 2 x20  Scaption 2 x 10 2lbs  Overhead Press 2 lb 2 x 10  Pulleys 10 x 5 sec  Bicep Curls 5 lbs 2 x 20  Tricep Ext black tubing 2 x 12  Finger extension yellow TB 2 x 20     Manual: 10min  IR/ER with manual resist/ rhthmic stab  RS at 100 deg elevation  PROM in all directions    Assessment: Tolerated treatment well  Patient could benefit from continued PT       Plan: Continue per plan of care

## 2018-03-12 ENCOUNTER — OFFICE VISIT (OUTPATIENT)
Dept: PHYSICAL THERAPY | Facility: CLINIC | Age: 73
End: 2018-03-12
Payer: MEDICARE

## 2018-03-12 DIAGNOSIS — M71.9 DISORDER OF BURSAE OF LEFT SHOULDER REGION: Primary | ICD-10-CM

## 2018-03-12 PROCEDURE — 97110 THERAPEUTIC EXERCISES: CPT

## 2018-03-12 PROCEDURE — 97140 MANUAL THERAPY 1/> REGIONS: CPT

## 2018-03-12 NOTE — PROGRESS NOTES
Daily Note     Today's date: 3/12/2018  Patient name: Sam Samaniego  : 1945  MRN: [id number]  Referring provider: Suzy Foster MD  Dx:   Encounter Diagnosis   Name Primary?  Disorder of bursae of left shoulder region Yes                 Session: 29  Last Re-Eval: 18    Subjective: Pt reports no new c/o  Reports left shoulder pain = 2/10  Objective: See treatment diary below  There ex: 20min  Rows with Green tubing 2 x 20  Lat pull downs with Green tubing 2 x20  Scaption 2 x 10 2lbs  Overhead Press 2 lb 2 x 10  Pulleys 10 x 5 sec  Bicep Curls 5 lbs 2 x 20  Tricep Ext black tubing 2 x 12  Finger extension yellow TB 2 x 20     Manual: 10 min  IR/ER with manual resist/ rhthmic stab  RS at 100 deg elevation  PROM in all directions    Assessment: Tolerated treatment well  Re-Eval next session    Plan: Continue per plan of care

## 2018-03-13 ENCOUNTER — APPOINTMENT (OUTPATIENT)
Dept: LAB | Facility: HOSPITAL | Age: 73
End: 2018-03-13
Payer: MEDICARE

## 2018-03-13 ENCOUNTER — TRANSCRIBE ORDERS (OUTPATIENT)
Dept: ADMINISTRATIVE | Facility: HOSPITAL | Age: 73
End: 2018-03-13

## 2018-03-13 DIAGNOSIS — F03.90 SENILE DEMENTIA, UNCOMPLICATED (HCC): ICD-10-CM

## 2018-03-13 DIAGNOSIS — F03.90 SENILE DEMENTIA, UNCOMPLICATED (HCC): Primary | ICD-10-CM

## 2018-03-13 DIAGNOSIS — E03.9 MYXEDEMA HEART DISEASE: ICD-10-CM

## 2018-03-13 DIAGNOSIS — E78.2 MIXED HYPERLIPIDEMIA: ICD-10-CM

## 2018-03-13 DIAGNOSIS — J44.9 OBSTRUCTIVE CHRONIC BRONCHITIS WITHOUT EXACERBATION (HCC): ICD-10-CM

## 2018-03-13 DIAGNOSIS — I51.9 MYXEDEMA HEART DISEASE: ICD-10-CM

## 2018-03-13 LAB
ALBUMIN SERPL BCP-MCNC: 3.5 G/DL (ref 3.5–5)
ALP SERPL-CCNC: 93 U/L (ref 46–116)
ALT SERPL W P-5'-P-CCNC: 17 U/L (ref 12–78)
ANION GAP SERPL CALCULATED.3IONS-SCNC: 10 MMOL/L (ref 4–13)
AST SERPL W P-5'-P-CCNC: 11 U/L (ref 5–45)
BASOPHILS # BLD AUTO: 0 THOUSANDS/ΜL (ref 0–0.1)
BASOPHILS NFR BLD AUTO: 0 % (ref 0–1)
BILIRUB SERPL-MCNC: 0.3 MG/DL (ref 0.2–1)
BUN SERPL-MCNC: 14 MG/DL (ref 5–25)
CALCIUM SERPL-MCNC: 9.1 MG/DL (ref 8.3–10.1)
CHLORIDE SERPL-SCNC: 106 MMOL/L (ref 100–108)
CHOLEST SERPL-MCNC: 170 MG/DL (ref 50–200)
CO2 SERPL-SCNC: 27 MMOL/L (ref 21–32)
CREAT SERPL-MCNC: 0.68 MG/DL (ref 0.6–1.3)
EOSINOPHIL # BLD AUTO: 0.2 THOUSAND/ΜL (ref 0–0.61)
EOSINOPHIL NFR BLD AUTO: 4 % (ref 0–6)
ERYTHROCYTE [DISTWIDTH] IN BLOOD BY AUTOMATED COUNT: 15.4 % (ref 11.6–15.1)
GFR SERPL CREATININE-BSD FRML MDRD: 87 ML/MIN/1.73SQ M
GLUCOSE P FAST SERPL-MCNC: 108 MG/DL (ref 65–99)
HCT VFR BLD AUTO: 36.4 % (ref 37–47)
HDLC SERPL-MCNC: 45 MG/DL (ref 40–60)
HGB BLD-MCNC: 11.5 G/DL (ref 12–16)
LDLC SERPL CALC-MCNC: 88 MG/DL (ref 0–100)
LYMPHOCYTES # BLD AUTO: 2.1 THOUSANDS/ΜL (ref 0.6–4.47)
LYMPHOCYTES NFR BLD AUTO: 32 % (ref 14–44)
MCH RBC QN AUTO: 24.3 PG (ref 27–31)
MCHC RBC AUTO-ENTMCNC: 31.7 G/DL (ref 31.4–37.4)
MCV RBC AUTO: 77 FL (ref 82–98)
MONOCYTES # BLD AUTO: 0.7 THOUSAND/ΜL (ref 0.17–1.22)
MONOCYTES NFR BLD AUTO: 11 % (ref 4–12)
NEUTROPHILS # BLD AUTO: 3.5 THOUSANDS/ΜL (ref 1.85–7.62)
NEUTS SEG NFR BLD AUTO: 53 % (ref 43–75)
NRBC BLD AUTO-RTO: 0 /100 WBCS
PLATELET # BLD AUTO: 297 THOUSANDS/UL (ref 130–400)
PMV BLD AUTO: 7.3 FL (ref 8.9–12.7)
POTASSIUM SERPL-SCNC: 3.9 MMOL/L (ref 3.5–5.3)
PROT SERPL-MCNC: 6.7 G/DL (ref 6.4–8.2)
RBC # BLD AUTO: 4.75 MILLION/UL (ref 4.2–5.4)
SODIUM SERPL-SCNC: 143 MMOL/L (ref 136–145)
TRIGL SERPL-MCNC: 183 MG/DL
TSH SERPL DL<=0.05 MIU/L-ACNC: 1.64 UIU/ML (ref 0.36–3.74)
WBC # BLD AUTO: 6.5 THOUSAND/UL (ref 4.8–10.8)

## 2018-03-13 PROCEDURE — 85025 COMPLETE CBC W/AUTO DIFF WBC: CPT | Performed by: INTERNAL MEDICINE

## 2018-03-13 PROCEDURE — 84443 ASSAY THYROID STIM HORMONE: CPT

## 2018-03-13 PROCEDURE — 80061 LIPID PANEL: CPT

## 2018-03-13 PROCEDURE — 80053 COMPREHEN METABOLIC PANEL: CPT | Performed by: INTERNAL MEDICINE

## 2018-03-13 PROCEDURE — 36415 COLL VENOUS BLD VENIPUNCTURE: CPT | Performed by: INTERNAL MEDICINE

## 2018-03-15 ENCOUNTER — OFFICE VISIT (OUTPATIENT)
Dept: PHYSICAL THERAPY | Facility: CLINIC | Age: 73
End: 2018-03-15
Payer: MEDICARE

## 2018-03-15 DIAGNOSIS — M71.9 DISORDER OF BURSAE OF LEFT SHOULDER REGION: Primary | ICD-10-CM

## 2018-03-15 PROCEDURE — 97140 MANUAL THERAPY 1/> REGIONS: CPT

## 2018-03-15 PROCEDURE — 97110 THERAPEUTIC EXERCISES: CPT

## 2018-03-15 NOTE — PROGRESS NOTES
Daily Note     Today's date: 3/15/2018  Patient name: Hanna Cam  : 1945  MRN: [id number]  Referring provider: Laurence Mustafa MD  Dx:   Encounter Diagnosis   Name Primary?  Disorder of bursae of left shoulder region Yes                 Session: 30  Last Re-Eval: 18    Subjective: Pt reports she has been getting several cramps in legs, arms and chest  Also reports she started a new medication for Alzheimers  Reports left shoulder pain = 2/10  Objective: See treatment diary below  There ex: 20min  Rows with Green tubing 2 x 20  Lat pull downs with Green tubing 2 x20  Scaption 2 x 10 2lbs  Overhead Press 2 lb 2 x 10  Pulleys 10 x 5 sec  Bicep Curls 5 lbs 2 x 20  Tricep Ext black tubing 2 x 12  Finger extension yellow TB 2 x 20     Manual: 10 min  IR/ER with manual resist/ rhthmic stab  RS at 100 deg elevation  PROM in all directions    Assessment: Tolerated treatment well  Pt was instructed to call MD regarding cramping  Re-Eval next session    Plan: Continue per plan of care

## 2018-03-19 ENCOUNTER — APPOINTMENT (OUTPATIENT)
Dept: PHYSICAL THERAPY | Facility: CLINIC | Age: 73
End: 2018-03-19
Payer: MEDICARE

## 2018-03-22 ENCOUNTER — APPOINTMENT (OUTPATIENT)
Dept: PHYSICAL THERAPY | Facility: CLINIC | Age: 73
End: 2018-03-22
Payer: MEDICARE

## 2018-03-23 ENCOUNTER — OFFICE VISIT (OUTPATIENT)
Dept: PHYSICAL THERAPY | Facility: CLINIC | Age: 73
End: 2018-03-23
Payer: MEDICARE

## 2018-03-23 DIAGNOSIS — M71.9 DISORDER OF BURSAE OF LEFT SHOULDER REGION: Primary | ICD-10-CM

## 2018-03-23 PROCEDURE — 97110 THERAPEUTIC EXERCISES: CPT

## 2018-03-23 PROCEDURE — G8985 CARRY GOAL STATUS: HCPCS

## 2018-03-23 PROCEDURE — 97140 MANUAL THERAPY 1/> REGIONS: CPT

## 2018-03-23 PROCEDURE — G8986 CARRY D/C STATUS: HCPCS

## 2018-03-23 NOTE — PROGRESS NOTES
PT Discharge    Today's date: 3/23/2018  Patient name: Gonzales [place]  : 1945  MRN: [id number]  Referring provider: Loretta Wilson MD  Dx:   Encounter Diagnosis     ICD-10-CM    1  Disorder of bursae of left shoulder region M75 [id number]                 Assessment/Plan Pt has demonstrated significant progress in ROM and strength  Pt is comfortable continuing HEP Independently as she has other medical issues she is currently dealing with    LTG:   Independent HEP  ACHIEVED  Increase AROM to [facility] Left Shoulder   ACHIEVED  Assess and treat left hand appropriately  ACHIEVED  Increase left shoulder MMT to 4/5  Partially Achieved  Pt to perform ADL's with less than 3/10 pain ACHIEVED  Pt to lift 5 lbs overhead without pain or substitution using left UE    D/C from OPPT to HEP    Subjective Pt reports left shoulder pain = 1-2/10 on average  Reports able to resume all previous activity without difficulty         Objective   There ex: 25 min  Rows with blue tubing 3 x 10  Lat pull downs with blue tubing 3 x 10  Scaption 2 x 10 2lbs  Overhead Press 2 lb 2 x 10  Pulleys 10 x 5 sec  Bicep Curls 4 lbs 2 x 20  Tricep Ext black tubing 2 x 12    Manual: 15 min  IR/ER with manual resist/ rhthmic stab  RS at 100 deg elevation  PROM in all directions    Left Shoulder   AROM  PROM  MMT   Flexion   125  165  3+  Abduction  80  90  3+  Int Rot   70  75  4  Ext Rot  55  75  3+  Horiz AB        Flowsheet Rows    Flowsheet Row Most Recent Value   PT/OT G-Codes   Current Score  69   Projected Score  59   FOTO information reviewed  Yes   Assessment Type  Discharge   G code set  Carrying, Moving & Handling Objects   Carrying, Moving and Handling Objects Goal Status ()  CJ   Carrying, Moving and Handling Objects Discharge Status ()  CJ          Precautions: Standard

## 2018-03-26 ENCOUNTER — OFFICE VISIT (OUTPATIENT)
Dept: OBGYN CLINIC | Facility: CLINIC | Age: 73
End: 2018-03-26
Payer: MEDICARE

## 2018-03-26 VITALS
SYSTOLIC BLOOD PRESSURE: 152 MMHG | HEIGHT: 60 IN | HEART RATE: 82 BPM | DIASTOLIC BLOOD PRESSURE: 76 MMHG | BODY MASS INDEX: 29.09 KG/M2 | WEIGHT: 148.2 LBS

## 2018-03-26 DIAGNOSIS — M75.122 COMPLETE ROTATOR CUFF TEAR OF LEFT SHOULDER: Primary | ICD-10-CM

## 2018-03-26 PROCEDURE — 99213 OFFICE O/P EST LOW 20 MIN: CPT | Performed by: ORTHOPAEDIC SURGERY

## 2018-03-26 NOTE — PROGRESS NOTES
H&P Exam - Orthopedics   Chloe Diego 67 y o  female MRN: [id number]  Unit/Bed#:  Encounter: [id number]    Assessment/Plan     Assessment:  Status post left shoulder rotator cuff repair over 4 months ago  Plan:  Rainer Almanza is doing very well  She was not have any significant discomfort except for into her neck and this is improving  She has completed physical therapy and I did review the therapy note  I believe she has done great and can follow up as needed and cannot be activities as tolerated and will continue with her home exercises  History of Present Illness   HPI:  Chloe Diego is a 67 y o  female who presents with left shoulder pain that is really only into her neck at this point  She has done great with physical therapy  She has been discharged now from therapy  Her pain is really only mild and dull and into the neck region only  She can do all activities without any significant discomfort  She had been previously treated for her neck however       Review of Systems   Constitutional: Negative for chills, fever and unexpected weight change  HENT: Negative for hearing loss, nosebleeds and sore throat  Eyes: Negative for pain, redness and visual disturbance  Respiratory: Negative for cough, shortness of breath and wheezing  Cardiovascular: Negative for chest pain, palpitations and leg swelling  Gastrointestinal: Negative for abdominal pain, nausea and vomiting  Endocrine: Negative for polydipsia and polyuria  Genitourinary: Negative for dysuria and hematuria  Skin: Negative for rash and wound  Neurological: Negative for dizziness, numbness and headaches  Psychiatric/Behavioral: Negative for decreased concentration and suicidal ideas  The patient is not nervous/anxious          Historical Information   Past Medical History:   Diagnosis Date    Ankle fracture, right     casted    Anxiety     Arthritis     Asthma     Bursitis     Constipation     Cystitis     chronic    Diverticulosis     Fibromyalgia     Fibromyalgia, primary     GERD (gastroesophageal reflux disease)     H/o Lyme disease     Hiatal hernia     History of Clostridium difficile infection     Hyperlipidemia     Hypothyroid     hypo    Labral tear of shoulder     left    Lichen sclerosus of female genitalia 2016    Murmur     Neck pain, chronic     gets steroid injections-none since late 2016    Neuralgia     Spinal stenosis     Ulcer ([address] )     Ulcer of gastric fundus     Wears glasses     Wears partial dentures     upper and lower     Past Surgical History:   Procedure Laterality Date    ABDOMINAL SURGERY      exploratory-removal of appendix    APPENDECTOMY      CARPAL TUNNEL RELEASE Bilateral     CHOLECYSTECTOMY      COLONOSCOPY      DILATION AND CURETTAGE OF UTERUS      x3 miscarriages    FOOT SURGERY Right     5th toe-hammertoe repair    HYSTERECTOMY      complete    CT COLONOSCOPY FLX DX W/COLLJ SPEC WHEN PFRMD N/A 10/23/2017    Procedure: EGD AND COLONOSCOPY;  Surgeon: Severiano Simpson MD;  [facility];   Service: Gastroenterology    CT DAR Gonzalez Left 11/13/2017    Procedure: SHOULDER ARTHROSCOPY WITH SUBACROMIAL DECOMPRESSION, ROTATOR CUFF REPAIR;  Surgeon: Dwain Aguilera MD;  Location: [facility] MAIN OR;  Service: Orthopedics    ROTATOR CUFF REPAIR Right     TONSILLECTOMY       Social History   History   Alcohol Use No     History   Drug Use No     History   Smoking Status    Never Smoker   Smokeless Tobacco    Never Used     Family History:   Family History   Problem Relation Age of Onset    Cancer Mother      colon    Alzheimer's disease Father     Heart disease Sister      MI x4-angioplasty x4 stents    Cancer Brother      brain tumor-age 6    Cancer Brother 72     prostate       Meds/Allergies   all medications and allergies reviewed  Allergies   Allergen Reactions    Omnipaque [Iohexol] Other (See Comments)     Shakes, "passed out"  Pneumovax 23 [Pneumococcal Vac Polyvalent] Hives    Iodine     Iv Dye  [Iodinated Diagnostic Agents] Confusion     Annotation - [id number]: shaking    Aspirin GI Intolerance and Nausea Only     Reaction Date: [id number]; Annotation - [id number]: 325mg causes bleeding  Cannot take a dose higher than 81mg-pt has a hx of ulcer    Codeine GI Intolerance     N/V    Flexeril [Cyclobenzaprine]      Unknown reaction per pt  Allergy was noted on physicians note    Latex Rash    Other Rash     Adhesive Tape-caused a rash       Objective   Vitals: Blood pressure 152/76, pulse 82, height 5' (1 524 m), weight 67 2 kg (148 lb 3 2 oz), not currently breastfeeding  ,Body mass index is 28 94 kg/m²  [unfilled]    [unfilled]    Invasive Devices     Epidural Line            Nerve Block Catheter 11/13/17 132 days                Physical Exam   Constitutional: She is oriented to person, place, and time  She appears well-developed and well-nourished  HENT:   Head: Normocephalic and atraumatic  Eyes: Conjunctivae and EOM are normal    Neck: Normal range of motion  Cardiovascular: Intact distal pulses  Pulmonary/Chest: Effort normal  No respiratory distress  Neurological: She is alert and oriented to person, place, and time  Skin: Skin is warm and dry  Psychiatric: She has a normal mood and affect  Her behavior is normal      Left Shoulder Exam     Tenderness   The patient is experiencing no tenderness           Range of Motion   Active Abduction: normal   Passive Abduction: normal   Extension: normal   Forward Flexion: normal   External Rotation: normal   Internal Rotation 0 degrees: normal     Muscle Strength   Abduction: 5/5   Internal Rotation: 5/5   External Rotation: 5/5     Other   Sensation: normal  Pulse: present             Lab Results: CBC: No results found for: WBC, HGB, HCT, MCV, PLT, ADJUSTEDWBC, MCH, MCHC, RDW, MPV, NRBC  Imaging: None today  EKG, Pathology, and Other Studies: None today    Code Status: [unfilled]  Advance Directive and Living Will: Yes    Power of :    POLST:

## 2018-03-30 ENCOUNTER — APPOINTMENT (OUTPATIENT)
Dept: RADIOLOGY | Facility: CLINIC | Age: 73
End: 2018-03-30
Payer: MEDICARE

## 2018-03-30 ENCOUNTER — OFFICE VISIT (OUTPATIENT)
Dept: OBGYN CLINIC | Facility: CLINIC | Age: 73
End: 2018-03-30
Payer: MEDICARE

## 2018-03-30 VITALS
WEIGHT: 147 LBS | BODY MASS INDEX: 28.86 KG/M2 | HEIGHT: 60 IN | DIASTOLIC BLOOD PRESSURE: 83 MMHG | SYSTOLIC BLOOD PRESSURE: 150 MMHG | HEART RATE: 90 BPM

## 2018-03-30 DIAGNOSIS — G89.29 CHRONIC RIGHT-SIDED LOW BACK PAIN WITHOUT SCIATICA: ICD-10-CM

## 2018-03-30 DIAGNOSIS — M70.61 TROCHANTERIC BURSITIS OF RIGHT HIP: Primary | ICD-10-CM

## 2018-03-30 DIAGNOSIS — M54.50 CHRONIC RIGHT-SIDED LOW BACK PAIN WITHOUT SCIATICA: ICD-10-CM

## 2018-03-30 DIAGNOSIS — M46.1 SACROILIITIS (HCC): ICD-10-CM

## 2018-03-30 DIAGNOSIS — M16.11 PRIMARY OSTEOARTHRITIS OF ONE HIP, RIGHT: ICD-10-CM

## 2018-03-30 DIAGNOSIS — M25.551 PAIN IN RIGHT HIP: ICD-10-CM

## 2018-03-30 PROCEDURE — 73502 X-RAY EXAM HIP UNI 2-3 VIEWS: CPT

## 2018-03-30 PROCEDURE — 99213 OFFICE O/P EST LOW 20 MIN: CPT | Performed by: ORTHOPAEDIC SURGERY

## 2018-03-30 RX ORDER — DONEPEZIL HYDROCHLORIDE 10 MG/1
10 TABLET, FILM COATED ORAL
Refills: 5 | COMMUNITY
Start: 2018-03-05

## 2018-03-30 RX ORDER — CYCLOBENZAPRINE HCL 5 MG
TABLET ORAL
Refills: 1 | COMMUNITY
Start: 2018-03-16 | End: 2018-07-06

## 2018-03-30 RX ORDER — TRIAMCINOLONE ACETONIDE 0.1 %
PASTE (GRAM) DENTAL
Refills: 0 | COMMUNITY
Start: 2018-03-15 | End: 2018-07-06

## 2018-03-30 NOTE — PROGRESS NOTES
Assessment/Plan:  1  Trochanteric bursitis of right hip     2  Pain in right hip  XR hip/pelv 2-3 vws right if performed   3  Primary osteoarthritis of one hip, right     4  Chronic right-sided low back pain without sciatica     5  Sacroiliitis ([address] )       Sen Zhu is a very pleasant 70-year-old female with mild right hip osteoarthritis, but seems more symptomatic of chronic low back pain, right hip trochanteric bursitis, and sacroiliitis  We discussed these findings with her and her friend here today  She was given a prescription for Voltaren gel to use on her right hip and low back up to 4 times a day as needed  We also provided her with a referral to physical therapy for core strengthening, stretching, and modalities for the right hip  At this time, no further workup or intervention is required for her right hip osteoarthritis  We would like her to follow up in approximately 2 months for a clinical re-evaluation  She will not need new images at that time  If she fails to see significant relief from these conservative measures, we can consider a repeat cortisone injection for her right hip trochanteric bursa  All questions were answered today  Subjective: Right hip pain    Patient ID: Andrea Jha is a 67 y o  female  Sen Zhu is a pleasant 70-year-old female who we last on December 2016 presenting for follow-up of her low back, SI joint, and right hip pain  Since she was last seen, she was diagnosed with Alzheimer's type dementia, and has been taking the medication for that  She reports that she has noticed an increased recently and right hip pain which she locates in the posterior aspect  She denies any paresthesias in the right lower extremity  She does have discomfort lying on the right side and occasionally radiating down towards the right knee  She has not been taking any medications for this  She previously saw benefit from physical therapy and cortisone injection          Review of Systems Constitutional: Positive for chills  HENT: Negative  Eyes: Negative  Respiratory: Negative  Cardiovascular: Negative  Gastrointestinal: Negative  Endocrine: Positive for polydipsia  Genitourinary: Negative  Musculoskeletal: Positive for arthralgias  Skin: Negative  Allergic/Immunologic: Negative  Neurological: Positive for headaches  Hematological: Negative  Psychiatric/Behavioral: Positive for decreased concentration  The patient is nervous/anxious  Past Medical History:   Diagnosis Date    Ankle fracture, right     casted    Anxiety     Arthritis     Asthma     Bursitis     Constipation     Cystitis     chronic    Diverticulosis     Fibromyalgia     Fibromyalgia, primary     GERD (gastroesophageal reflux disease)     H/o Lyme disease     Hiatal hernia     History of Clostridium difficile infection     Hyperlipidemia     Hypothyroid     hypo    Labral tear of shoulder     left    Lichen sclerosus of female genitalia 2016    Murmur     Neck pain, chronic     gets steroid injections-none since late 2016    Neuralgia     Spinal stenosis     Ulcer ([address] )     Ulcer of gastric fundus     Wears glasses     Wears partial dentures     upper and lower       Past Surgical History:   Procedure Laterality Date    ABDOMINAL SURGERY      exploratory-removal of appendix    APPENDECTOMY      CARPAL TUNNEL RELEASE Bilateral     CHOLECYSTECTOMY      COLONOSCOPY      DILATION AND CURETTAGE OF UTERUS      x3 miscarriages    FOOT SURGERY Right     5th toe-hammertoe repair    HYSTERECTOMY      complete    AL COLONOSCOPY FLX DX W/COLLJ SPEC WHEN PFRMD N/A 10/23/2017    Procedure: EGD AND COLONOSCOPY;  Surgeon: Caitlyn Zelaya MD;  [facility];   Service: Gastroenterology    AL SHLDR Ples Hernandez Left 11/13/2017    Procedure: SHOULDER ARTHROSCOPY WITH SUBACROMIAL DECOMPRESSION, ROTATOR CUFF REPAIR;  Surgeon: Aaron Milan MD; Location[facility] MAIN OR;  Service: Orthopedics    ROTATOR CUFF REPAIR Right     TONSILLECTOMY         Family History   Problem Relation Age of Onset    Cancer Mother      colon    Alzheimer's disease Father     Heart disease Sister      MI x4-angioplasty x4 stents    Cancer Brother      brain tumor-age 6    Cancer Brother 72     prostate       Social History     Occupational History    Not on file       Social History Main Topics    Smoking status: Never Smoker    Smokeless tobacco: Never Used    Alcohol use No    Drug use: No    Sexual activity: No         Current Outpatient Prescriptions:     albuterol (ACCUNEB) 0 63 MG/3ML nebulizer solution, Take 1 ampule by nebulization every 6 (six) hours as needed for wheezing, Disp: , Rfl:     albuterol (PROVENTIL HFA,VENTOLIN HFA) 90 mcg/act inhaler, Inhale 2 puffs every 6 (six) hours as needed for wheezing , Disp: , Rfl:     aspirin 81 mg chewable tablet, Chew 81 mg every morning  , Disp: , Rfl:     atorvastatin (LIPITOR) 20 mg tablet, Take 1 tablet by mouth daily with dinner, Disp: 30 tablet, Rfl: 0    baclofen 20 mg tablet, Take 20 mg by mouth as needed  , Disp: , Rfl:     cyclobenzaprine (FLEXERIL) 5 mg tablet, TAKE 1 TAB EVERY 6-8 HOURS FOR MUSCLE SPASMS, Disp: , Rfl: 1    cycloSPORINE (RESTASIS) 0 05 % ophthalmic emulsion, Administer 1 drop to both eyes 2 (two) times a day  , Disp: , Rfl:     donepezil (ARICEPT) 5 mg tablet, Take 5 mg by mouth daily, Disp: , Rfl: 5    famotidine (PEPCID) 20 mg tablet, Take 20 mg by mouth 2 (two) times a day, Disp: , Rfl:     levothyroxine 100 mcg tablet, Take 1 tablet by mouth every morning, Disp: 30 tablet, Rfl: 1    melatonin 3 mg, Take 3 mg by mouth daily at bedtime as needed, Disp: , Rfl:     multivitamin (THERAGRAN) TABS, Take 1 tablet by mouth every morning, Disp: , Rfl:     NON FORMULARY, daily at bedtime Stool softner, Disp: , Rfl:     NON FORMULARY, 1 capsule daily at bedtime as needed Oxy powder-, Disp: , Rfl:     POTASSIUM CHLORIDE PO, Take by mouth every morning, Disp: , Rfl:     Probiotic Product (PROBIOTIC DAILY PO), Take by mouth every morning, Disp: , Rfl:     triamcinolone (KENALOG) 0 1 % oral topical paste, APPLY A THIN FILM OVER THE AFFECTED AREA 2-3X DAILY AFTER MEALS & BEFORE BED X 7 DAYS, Disp: , Rfl: 0    Allergies   Allergen Reactions    Omnipaque [Iohexol] Other (See Comments)     Shakes, "passed out"    Pneumovax 23 [Pneumococcal Vac Polyvalent] Hives    Iodine     Iv Dye  [Iodinated Diagnostic Agents] Confusion     Annotation - [id number]: shaking    Aspirin GI Intolerance and Nausea Only     Reaction Date: [id number]; Annotation - [id number]: 325mg causes bleeding  Cannot take a dose higher than 81mg-pt has a hx of ulcer    Codeine GI Intolerance     N/V    Flexeril [Cyclobenzaprine]      Unknown reaction per pt  Allergy was noted on physicians note    Latex Rash    Other Rash     Adhesive Tape-caused a rash       Objective:  Vitals:    03/30/18 0818   BP: 150/83   Pulse: 90       Body mass index is 28 71 kg/m²  Right Hip Exam     Comments:  Examination of her low back and right hip reveals tenderness over the posterior superior iliac spine, right-sided paraspinal muscles, and exquisitely over the right hip greater trochanteric bursa  There is no anterior hip tenderness  She does not have any limitations in range of motion passively of the right hip  She does have a Positive impingement and Cher sign  She has no discomfort with log rolling of the right hip  She has negative Stingefield test   She has a positive Josh's test   Her thigh and calf remained soft and nontender  She has discomfort with compression of the pelvis at the SI joint  She ambulates with a slightly antalgic gait on the right and she is grossly distally neurovascularly unchanged  Physical Exam   Constitutional: She is oriented to person, place, and time   She appears well-developed and well-nourished  Body mass index is 28 71 kg/m²  HENT:   Head: Normocephalic and atraumatic  Eyes: EOM are normal    Glasses    Neck: Normal range of motion  Cardiovascular: Intact distal pulses  Pulmonary/Chest: Effort normal    Musculoskeletal:   See ortho exam   Neurological: She is alert and oriented to person, place, and time  Skin: Skin is warm and dry  Psychiatric: She has a normal mood and affect  Her behavior is normal  Judgment and thought content normal      I have personally reviewed pertinent films in PACS taken today of her pelvis and right hip  She does have moderate arthritic changes with superior joint space narrowing, marginal osteophytes, and sclerosis  There are also degenerative changes of the SI joints and with visualized of the lumbar spine  There is no acute fractures, dislocations, or evidence of loose bodies

## 2018-04-04 ENCOUNTER — EVALUATION (OUTPATIENT)
Dept: PHYSICAL THERAPY | Facility: CLINIC | Age: 73
End: 2018-04-04
Payer: MEDICARE

## 2018-04-04 DIAGNOSIS — M46.1 SACROILIITIS (HCC): ICD-10-CM

## 2018-04-04 DIAGNOSIS — M70.61 TROCHANTERIC BURSITIS OF RIGHT HIP: ICD-10-CM

## 2018-04-04 DIAGNOSIS — M54.50 CHRONIC RIGHT-SIDED LOW BACK PAIN WITHOUT SCIATICA: Primary | ICD-10-CM

## 2018-04-04 DIAGNOSIS — M25.551 PAIN IN RIGHT HIP: ICD-10-CM

## 2018-04-04 DIAGNOSIS — G89.29 CHRONIC RIGHT-SIDED LOW BACK PAIN WITHOUT SCIATICA: Primary | ICD-10-CM

## 2018-04-04 PROCEDURE — 97161 PT EVAL LOW COMPLEX 20 MIN: CPT

## 2018-04-04 PROCEDURE — G8981 BODY POS CURRENT STATUS: HCPCS

## 2018-04-04 PROCEDURE — G8983 BODY POS D/C STATUS: HCPCS

## 2018-04-04 PROCEDURE — G8982 BODY POS GOAL STATUS: HCPCS

## 2018-04-04 NOTE — PROGRESS NOTES
PT Evaluation     Today's date: 2018  Patient name: Trudy Irving  : 1945  MRN: [id number]  Referring provider: Ismael Qiu  Dx:   Encounter Diagnosis     ICD-10-CM    1  Chronic right-sided low back pain without sciatica M54 5 Ambulatory referral to Physical Therapy    G89 29    2  Pain in right hip M25 551 Ambulatory referral to Physical Therapy   3  Trochanteric bursitis of right hip M70 61 Ambulatory referral to Physical Therapy   4  Sacroiliitis (HCC) M46 1 Ambulatory referral to Physical Therapy                  Assessment  Impairments: abnormal muscle firing, abnormal or restricted ROM, activity intolerance, impaired physical strength, lacks appropriate home exercise program and pain with function    Assessment details: Trudy Irving is a 67 y o  female who presents with pain and decreased joint mobility  Due to these impairments, patient has difficulty performing ADL's, recreational activities, engaging in social activities and transfers  Patient's clinical presentation is consistent with their referring diagnosis of Chronic right-sided low back pain without sciatica  (primary encounter diagnosis)  Pt presents with significant muscle cramping in lower extremities and thorax during Objective assessments  Pt reports this has become regular for her since starting a new med approx 1 month ago for Alzheimer's  Pt verbalized that these muscle spasms were affecting her quality of life  Pt reports her Neurologist prescribed a muscle relaxer that helps however pt reports it makes her sleep so she does not take it   Pt was encouraged to speak with Neurologist again to see if there is any alternatives as the muscle spasms would not allow for patient to tolerate physical therapy assessment or treatment  Plan: Ambulatory referral to Physical Therapy    Pain in right hip  Plan: Ambulatory referral to Physical Therapy    Trochanteric bursitis of right hip  Plan: Ambulatory referral to Physical Therapy    Sacroiliitis ([facility] [address] )  Plan: Ambulatory referral to Physical Therapy    Patient has been educated in plan of care  Patient would benefit from skilled physical therapy services to address their aforementioned functional limitations and progress towards prior level of function and independence with home exercise program      Understanding of Dx/Px/POC: good   Prognosis: good    Goals  Short Term Goals: Target Date 4 weeks  1  Initiate and advance HEP  2  Complete Lumbar and Hip assessment      Long Term Goals: Target Date 8 weeks  1  Indep with HEP  2  Pt to report 3/10 pain at worst in Low Back and Right Hip  3  Pt to ambulate community distances with less than 3/10  4  Pt to resume cleaning the house with less than 3/10 pain      Plan  Patient would benefit from: skilled PT  Planned modality interventions: cryotherapy, electrical stimulation/Russian stimulation and thermotherapy: hydrocollator packs  Planned therapy interventions: joint mobilization, manual therapy, patient education, postural training, activity modification, abdominal trunk stabilization, body mechanics training, flexibility, functional ROM exercises, graded exercise, home exercise program, neuromuscular re-education, strengthening, stretching, therapeutic activities, therapeutic exercise, motor coordination training, muscle pump exercises, gait training, balance/weight bearing training and ADL training  Frequency: 2x week  Duration in weeks: 8  Treatment plan discussed with: patient        Subjective Evaluation    History of Present Illness  Date of onset: 3/1/2018  Mechanism of injury: Insidious Onset, No apparent reason  Recurrent probem    Quality of life: fair    Pain  Current pain ratin  At best pain ratin  At worst pain ratin  Location: Across low back and right hip  Quality: dull ache and throbbing  Alleviating factors: Nothing      Patient Goals  Patient goal: Have no pain        Objective  Lumbar ROM  EIS = 50% Loss  FIS = 25% Loss    Hip ROM: Bilaterally WFL; however thorough assessment could not be completed as patient was suffering from  significant muscle cramping in LE's and thorax with position changes    Precautions: Standard

## 2018-04-05 ENCOUNTER — TELEPHONE (OUTPATIENT)
Dept: OBGYN CLINIC | Facility: HOSPITAL | Age: 73
End: 2018-04-05

## 2018-04-05 NOTE — TELEPHONE ENCOUNTER
Patient is calling   [phone]  Patient sees Dr Milton Olmedo    Patient is calling because she is having major muscle spasms in her back  She attempted PT but she can't do anything due to the spasms  PT told her to call to see if there was something that we could give her for the spasms or if you want to see her  She is stating that she cannot do anything without getting these spasms & she gets them all over, they are not limited to her back/leg region  Please advise

## 2018-04-06 NOTE — TELEPHONE ENCOUNTER
Spoke with the pt and she is aware she also stated she started a new medication and this might be a side affect  I let her know to contact her pcp and see if they can prescribe her anything

## 2018-04-06 NOTE — TELEPHONE ENCOUNTER
It sounds like she is having diffuse muscle spasm  It is expected that she would be at least somewhat uncomfortable after therapy, as she is using different muscle groups than normal  Unfortunately, we are unable to prescribe anything for this  She may contact her PCP to see if a muscle relaxer would be acceptable given her other medications and medical comorbidities

## 2018-05-03 ENCOUNTER — APPOINTMENT (OUTPATIENT)
Dept: LAB | Facility: CLINIC | Age: 73
End: 2018-05-03
Payer: MEDICARE

## 2018-05-03 ENCOUNTER — TRANSCRIBE ORDERS (OUTPATIENT)
Dept: LAB | Facility: CLINIC | Age: 73
End: 2018-05-03

## 2018-05-03 DIAGNOSIS — D50.8 IRON DEFICIENCY ANEMIA SECONDARY TO INADEQUATE DIETARY IRON INTAKE: Primary | ICD-10-CM

## 2018-05-03 LAB — VIT B12 SERPL-MCNC: 1472 PG/ML (ref 100–900)

## 2018-05-03 PROCEDURE — 82607 VITAMIN B-12: CPT

## 2018-05-03 PROCEDURE — 82784 ASSAY IGA/IGD/IGG/IGM EACH: CPT

## 2018-05-03 PROCEDURE — 36415 COLL VENOUS BLD VENIPUNCTURE: CPT

## 2018-05-03 PROCEDURE — 86255 FLUORESCENT ANTIBODY SCREEN: CPT

## 2018-05-03 PROCEDURE — 83516 IMMUNOASSAY NONANTIBODY: CPT

## 2018-05-04 LAB
ENDOMYSIUM IGA SER QL: NEGATIVE
GLIADIN PEPTIDE IGA SER-ACNC: 2 UNITS (ref 0–19)
GLIADIN PEPTIDE IGG SER-ACNC: 2 UNITS (ref 0–19)
IGA SERPL-MCNC: 103 MG/DL (ref 64–422)
TTG IGA SER-ACNC: <2 U/ML (ref 0–3)
TTG IGA SER-ACNC: <2 U/ML (ref 0–3)
TTG IGG SER-ACNC: <2 U/ML (ref 0–5)
TTG IGG SER-ACNC: <2 U/ML (ref 0–5)

## 2018-05-05 ENCOUNTER — APPOINTMENT (OUTPATIENT)
Dept: LAB | Facility: HOSPITAL | Age: 73
End: 2018-05-05
Payer: MEDICARE

## 2018-05-05 ENCOUNTER — TRANSCRIBE ORDERS (OUTPATIENT)
Dept: ADMINISTRATIVE | Facility: HOSPITAL | Age: 73
End: 2018-05-05

## 2018-05-05 DIAGNOSIS — D50.8 IRON DEFICIENCY ANEMIA SECONDARY TO INADEQUATE DIETARY IRON INTAKE: Primary | ICD-10-CM

## 2018-05-05 DIAGNOSIS — D50.8 IRON DEFICIENCY ANEMIA SECONDARY TO INADEQUATE DIETARY IRON INTAKE: ICD-10-CM

## 2018-05-05 LAB
HEMOCCULT STL QL: NEGATIVE
NEGATIVE CONTROL: NEGATIVE
POSITIVE CONTROL: POSITIVE

## 2018-05-05 PROCEDURE — 82272 OCCULT BLD FECES 1-3 TESTS: CPT

## 2018-05-30 ENCOUNTER — OFFICE VISIT (OUTPATIENT)
Dept: OBGYN CLINIC | Facility: CLINIC | Age: 73
End: 2018-05-30
Payer: MEDICARE

## 2018-05-30 VITALS
DIASTOLIC BLOOD PRESSURE: 74 MMHG | HEIGHT: 60 IN | BODY MASS INDEX: 29.76 KG/M2 | HEART RATE: 75 BPM | WEIGHT: 151.6 LBS | SYSTOLIC BLOOD PRESSURE: 128 MMHG

## 2018-05-30 DIAGNOSIS — G89.29 CHRONIC RIGHT-SIDED LOW BACK PAIN WITHOUT SCIATICA: ICD-10-CM

## 2018-05-30 DIAGNOSIS — M70.61 GREATER TROCHANTERIC BURSITIS OF RIGHT HIP: Primary | ICD-10-CM

## 2018-05-30 DIAGNOSIS — M16.11 PRIMARY OSTEOARTHRITIS OF ONE HIP, RIGHT: ICD-10-CM

## 2018-05-30 DIAGNOSIS — M54.50 CHRONIC RIGHT-SIDED LOW BACK PAIN WITHOUT SCIATICA: ICD-10-CM

## 2018-05-30 DIAGNOSIS — M46.1 SACROILIITIS (HCC): ICD-10-CM

## 2018-05-30 PROCEDURE — 99214 OFFICE O/P EST MOD 30 MIN: CPT | Performed by: ORTHOPAEDIC SURGERY

## 2018-05-30 RX ORDER — IRON POLYSACCHARIDE COMPLEX 150 MG
150 CAPSULE ORAL DAILY
COMMUNITY
End: 2022-05-02

## 2018-05-30 RX ORDER — FLUOCINOLONE ACETONIDE 0.25 MG/G
OINTMENT TOPICAL 2 TIMES DAILY
COMMUNITY
End: 2018-07-06

## 2018-05-30 NOTE — PROGRESS NOTES
Assessment/Plan:  1  Greater trochanteric bursitis of right hip     2  Primary osteoarthritis of one hip, right     3  Chronic right-sided low back pain without sciatica     4  Sacroiliitis ([address] )       Patient is here for follow-up regarding her right hip greater trochanteric bursitis her chronic low sided back pain with sacroiliitis  She presents today with her granddaughter Lorna  She admits that she cannot remember receiving a prescription for Voltaren gel/referral for physical therapy on her last visit  The patient does have progressing Alzheimer's and cannot recall prior encounters very well  She states that despite no use of the the gel or physical therapy she states the pain has decreased  The plan of care was discussed with her granddaughter who was here today  Instead of physical therapy we will give her a home exercise program to do 3 times a day as getting a  to physical therapy throughout the week would be difficult for her  I also asked the granddaughter to go home and check to see if she has of Voltaren prescription at home and if she does not she should call the office in 1 will be Re prescribed for her  Her questions were addressed today in the plan of care was discussed at length with the granddaughter as well as with the patient  I will see her back in approximately 3 months time for repeat clinical follow-up  No new x-rays are needed at that visit  The patient may call the office at anytime if any questions or concerns should arise  Subjective:  Right hip and right-sided low back follow-up    Patient ID: Brian Demarco is a 67 y o  female  HPI  Patient is here for follow-up regarding her right hip greater troch bursitis in her right-sided low back complaints  She admits that she has progressive Alzheimer's and cannot recall what medications or therapeutic intervention she has participated in since her last visit    She does not feel that she use the Voltaren cream nor attended physical therapy  Her granddaughter, Ronnie Anguiano, was with her today  She states that despite lack of therapeutic intervention she has experienced an 80% reduction in her discomfort  She denies any paresthesias down the right lower extremity  She states that her right-sided low back pain is only painful intermittently and is not as severe as it used to be  Review of Systems   Constitutional: Positive for activity change  HENT: Negative  Eyes: Negative  Respiratory: Negative  Cardiovascular: Negative  Gastrointestinal: Negative  Endocrine: Negative  Musculoskeletal: Positive for arthralgias and back pain  Skin: Negative  Neurological: Negative  Psychiatric/Behavioral: Negative            Past Medical History:   Diagnosis Date    Ankle fracture, right     casted    Anxiety     Arthritis     Asthma     Bursitis     Constipation     Cystitis     chronic    Diverticulosis     Fibromyalgia     Fibromyalgia, primary     GERD (gastroesophageal reflux disease)     H/o Lyme disease     Hiatal hernia     History of Clostridium difficile infection     Hyperlipidemia     Hypothyroid     hypo    Labral tear of shoulder     left    Lichen sclerosus of female genitalia 2016    Murmur     Neck pain, chronic     gets steroid injections-none since late 2016    Neuralgia     Spinal stenosis     Ulcer     Ulcer of gastric fundus     Wears glasses     Wears partial dentures     upper and lower       Past Surgical History:   Procedure Laterality Date    ABDOMINAL SURGERY      exploratory-removal of appendix    APPENDECTOMY      CARPAL TUNNEL RELEASE Bilateral     CHOLECYSTECTOMY      COLONOSCOPY      DILATION AND CURETTAGE OF UTERUS      x3 miscarriages    FOOT SURGERY Right     5th toe-hammertoe repair    HYSTERECTOMY      complete    WI COLONOSCOPY FLX DX W/COLLJ SPEC WHEN PFRMD N/A 10/23/2017    Procedure: EGD AND COLONOSCOPY;  Surgeon: Vitaly Campos MD; [facility]; Service: Gastroenterology    [place] DAR Cox Left 11/13/2017    Procedure: SHOULDER ARTHROSCOPY WITH SUBACROMIAL DECOMPRESSION, ROTATOR CUFF REPAIR;  Surgeon: Juan A Arce MD;  Location: [facility] MAIN OR;  Service: Orthopedics    ROTATOR CUFF REPAIR Right     TONSILLECTOMY         Family History   Problem Relation Age of Onset    Cancer Mother      colon    Alzheimer's disease Father     Heart disease Sister      MI x4-angioplasty x4 stents    Cancer Brother      brain tumor-age 6    Cancer Brother 72     prostate       Social History     Occupational History    Not on file       Social History Main Topics    Smoking status: Never Smoker    Smokeless tobacco: Never Used    Alcohol use No    Drug use: No    Sexual activity: No         Current Outpatient Prescriptions:     albuterol (ACCUNEB) 0 63 MG/3ML nebulizer solution, Take 1 ampule by nebulization every 6 (six) hours as needed for wheezing, Disp: , Rfl:     albuterol (PROVENTIL HFA,VENTOLIN HFA) 90 mcg/act inhaler, Inhale 2 puffs every 6 (six) hours as needed for wheezing , Disp: , Rfl:     aspirin 81 mg chewable tablet, Chew 81 mg every morning  , Disp: , Rfl:     atorvastatin (LIPITOR) 20 mg tablet, Take 1 tablet by mouth daily with dinner, Disp: 30 tablet, Rfl: 0    cyclobenzaprine (FLEXERIL) 5 mg tablet, TAKE 1 TAB EVERY 6-8 HOURS FOR MUSCLE SPASMS, Disp: , Rfl: 1    cycloSPORINE (RESTASIS) 0 05 % ophthalmic emulsion, Administer 1 drop to both eyes 2 (two) times a day  , Disp: , Rfl:     dexamethasone 0 5 mg/mL SUSP, , Disp: , Rfl:     donepezil (ARICEPT) 5 mg tablet, Take 5 mg by mouth daily, Disp: , Rfl: 5    fluocinolone (SYNALAR) 0 025 % ointment, Apply topically 2 (two) times a day, Disp: , Rfl:     iron polysaccharides (FERREX 150) 150 mg capsule, Take 150 mg by mouth 2 (two) times a day, Disp: , Rfl:     levothyroxine 100 mcg tablet, Take 1 tablet by mouth every morning, Disp: 30 tablet, Rfl: 1    melatonin 3 mg, Take 3 mg by mouth daily at bedtime as needed, Disp: , Rfl:     multivitamin (THERAGRAN) TABS, Take 1 tablet by mouth every morning, Disp: , Rfl:     NON FORMULARY, daily at bedtime Stool softner, Disp: , Rfl:     NON FORMULARY, 1 capsule daily at bedtime as needed Oxy powder-, Disp: , Rfl:     nystatin (MYCOSTATIN) 100,000 units/mL suspension, Apply 500,000 Units to the mouth or throat 4 (four) times a day, Disp: , Rfl:     Probiotic Product (PROBIOTIC DAILY PO), Take by mouth every morning, Disp: , Rfl:     baclofen 20 mg tablet, Take 20 mg by mouth as needed  , Disp: , Rfl:     diclofenac sodium (VOLTAREN) 1 %, Apply 2 g topically 4 (four) times a day, Disp: 1 Tube, Rfl: 1    famotidine (PEPCID) 20 mg tablet, Take 20 mg by mouth 2 (two) times a day, Disp: , Rfl:     POTASSIUM CHLORIDE PO, Take by mouth every morning, Disp: , Rfl:     triamcinolone (KENALOG) 0 1 % oral topical paste, APPLY A THIN FILM OVER THE AFFECTED AREA 2-3X DAILY AFTER MEALS & BEFORE BED X 7 DAYS, Disp: , Rfl: 0    Allergies   Allergen Reactions    Omnipaque [Iohexol] Other (See Comments)     Shakes, "passed out"    Pneumovax 23 [Pneumococcal Vac Polyvalent] Hives    Iodine     Iv Dye  [Iodinated Diagnostic Agents] Confusion     Annotation - [id number]: shaking    Aspirin GI Intolerance and Nausea Only     Reaction Date: [id number]; Annotation - [id number]: 325mg causes bleeding  Cannot take a dose higher than 81mg-pt has a hx of ulcer    Codeine GI Intolerance     N/V    Flexeril [Cyclobenzaprine]      Unknown reaction per pt  Allergy was noted on physicians note    Latex Rash    Other Rash     Adhesive Tape-caused a rash       Objective:  Vitals:    05/30/18 0801   BP: 128/74   Pulse: 75       Body mass index is 29 61 kg/m²      Right Knee Exam     Other   Other tests: no effusion present      Right Hip Exam     Other   Erythema: absent  Scars: absent  Sensation: normal  Pulse: present    Comments:  Examination of her low back and right hip reveals tenderness over the posterior superior iliac spine, right-sided paraspinal muscles, and over the right hip greater trochanteric bursa  There is no anterior hip tenderness  She does not have any limitations in range of motion passively of the right hip  She does have a Positive impingement and Cher sign  She has no discomfort with log rolling of the right hip  She has negative Stingefield test   She has a positive Josh's test   Her thigh and calf remained soft and nontender  She has discomfort with compression of the pelvis at the SI joint but decreased  She ambulates with a slightly antalgic gait on the right and she is grossly distally neurovascularly unchanged  Negative neuro straight leg raise          Observations     Right Knee   Negative for effusion  Physical Exam   Constitutional: She is oriented to person, place, and time  She appears well-developed  HENT:   Head: Atraumatic  Eyes: EOM are normal    Neck: Neck supple  Cardiovascular: Normal rate  Pulmonary/Chest: Effort normal    Musculoskeletal:        Right knee: She exhibits no effusion  See orthopedic exam   Neurological: She is alert and oriented to person, place, and time  Skin: Skin is warm and dry  Psychiatric: She has a normal mood and affect  Nursing note and vitals reviewed  Suzy JOHNSON    Division of Adult Reconstruction  Department of [facility]

## 2018-05-31 ENCOUNTER — TELEPHONE (OUTPATIENT)
Dept: OBGYN CLINIC | Facility: CLINIC | Age: 73
End: 2018-05-31

## 2018-05-31 DIAGNOSIS — M54.50 CHRONIC RIGHT-SIDED LOW BACK PAIN WITHOUT SCIATICA: ICD-10-CM

## 2018-05-31 DIAGNOSIS — M46.1 SACROILIITIS (HCC): ICD-10-CM

## 2018-05-31 DIAGNOSIS — M70.61 GREATER TROCHANTERIC BURSITIS OF RIGHT HIP: Primary | ICD-10-CM

## 2018-05-31 DIAGNOSIS — G89.29 CHRONIC RIGHT-SIDED LOW BACK PAIN WITHOUT SCIATICA: ICD-10-CM

## 2018-05-31 DIAGNOSIS — M25.551 RIGHT HIP PAIN: ICD-10-CM

## 2018-05-31 DIAGNOSIS — M16.11 PRIMARY OSTEOARTHRITIS OF ONE HIP, RIGHT: ICD-10-CM

## 2018-05-31 NOTE — TELEPHONE ENCOUNTER
----- Message from Yaima Soliz PA-C sent at 5/31/2018  8:48 AM EDT -----  Please call patient and let her know that PT referral was placed for her

## 2018-05-31 NOTE — PROGRESS NOTES
Patient changed her mind and would like prescription to physical therapy for her right hip  One was placed for her in the chart today  She may begin at her earliest convenience

## 2018-06-11 ENCOUNTER — EVALUATION (OUTPATIENT)
Dept: PHYSICAL THERAPY | Facility: CLINIC | Age: 73
End: 2018-06-11
Payer: MEDICARE

## 2018-06-11 DIAGNOSIS — M54.50 CHRONIC RIGHT-SIDED LOW BACK PAIN WITHOUT SCIATICA: Primary | ICD-10-CM

## 2018-06-11 DIAGNOSIS — G89.29 CHRONIC RIGHT-SIDED LOW BACK PAIN WITHOUT SCIATICA: Primary | ICD-10-CM

## 2018-06-11 PROCEDURE — 97161 PT EVAL LOW COMPLEX 20 MIN: CPT

## 2018-06-11 PROCEDURE — 97110 THERAPEUTIC EXERCISES: CPT

## 2018-06-11 PROCEDURE — G8979 MOBILITY GOAL STATUS: HCPCS

## 2018-06-11 PROCEDURE — G8978 MOBILITY CURRENT STATUS: HCPCS

## 2018-06-11 NOTE — PROGRESS NOTES
PT Evaluation     Today's date: 2018  Patient name: Marilee Davies  : 1945  MRN: [id number]  Referring provider: Gasper Perdomo  Dx:   Encounter Diagnosis     ICD-10-CM    1  Chronic right-sided low back pain without sciatica M54 5     G89 29                   Assessment  Impairments: abnormal muscle firing, abnormal or restricted ROM, activity intolerance, impaired physical strength, lacks appropriate home exercise program and pain with function    Assessment details: Marilee Davies is a 67 y o  female who presents with pain, decreased strength and decreased ROM  Due to these impairments, patient has difficulty performing ADL's, engaging in social activities and lifting/carrying  Patient's clinical presentation is consistent with their referring diagnosis of Chronic right-sided low back pain without sciatica  (primary encounter diagnosis)    Patient has been educated in home exercise program and plan of care  Patient would benefit from skilled physical therapy services to address their aforementioned functional limitations and progress towards prior level of function and independence with home exercise program  Pt also experiences significant muscle cramping in intercostals and LE's which may limit pt's ability to perform therapeutic exercise    Understanding of Dx/Px/POC: fair   Prognosis: fair    Goals  Short Term Goals: Target Date 18  1  Initiate and advance HEP  2  Pt to report 4/10 pain at most in low back  3  Pt to sleep for 6 hours without being woken by low back pain      Long Term Goals: Target Date 18  1  Indep with HEP  2  Pt to reports 3/10 low back at worst  3  Pt to be able to change positions without increase in low back pain  4   Pt to resume vacuuming and sweeping with less than 3/10 low back pain      Plan  Patient would benefit from: skilled PT  Planned modality interventions: cryotherapy and thermotherapy: hydrocollator packs  Planned therapy interventions: joint mobilization, manual therapy, patient education, postural training, activity modification, abdominal trunk stabilization, body mechanics training, flexibility, functional ROM exercises, graded exercise, home exercise program, neuromuscular re-education, strengthening, stretching, therapeutic activities, therapeutic exercise, motor coordination training, muscle pump exercises, gait training, balance/weight bearing training and ADL training  Frequency: 3x week  Duration in weeks: 10  Treatment plan discussed with: patient        Subjective Evaluation    History of Present Illness  Date of onset: 2018  Mechanism of injury: Insidious onset, progressively worsening  Recurrent probem    Quality of life: good    Pain  Current pain ratin  At best pain ratin  At worst pain ratin  Location: Right Low Back and hip  Progression: worsening    Social Support  Steps to enter house: yes  Stairs in house: yes   Lives with: spouse    Employment status: not working  Patient Goals  Patient goals for therapy: decreased pain      Pt reports history off Anemia and Alzheimers    Objective  Lumbar ROM:  Flexion in Standing = 25% loss  Extension in standing = 50% loss    Repeated Motion Testing:  RFIS = Increased pain, no worse  GRISELDA = Increased pain, remained worse  RFIL = No effect    Lumbar flex/rot mob = Produced pain on left low back, no change in right low back    TTP in Right buttock and +++ in right Piriformis    Precautions: Dementia, Anemia    There ex: 15 min  Bridges 20x  Hip add with ball 10 x 2 sec  RFIL  Bilateral LE stretching HS and piriformis    Manual: 5 min  STM right piriformis and Glut

## 2018-06-13 ENCOUNTER — OFFICE VISIT (OUTPATIENT)
Dept: PHYSICAL THERAPY | Facility: CLINIC | Age: 73
End: 2018-06-13
Payer: MEDICARE

## 2018-06-13 DIAGNOSIS — G89.29 CHRONIC RIGHT-SIDED LOW BACK PAIN WITHOUT SCIATICA: Primary | ICD-10-CM

## 2018-06-13 DIAGNOSIS — M54.50 CHRONIC RIGHT-SIDED LOW BACK PAIN WITHOUT SCIATICA: Primary | ICD-10-CM

## 2018-06-13 PROCEDURE — 97110 THERAPEUTIC EXERCISES: CPT

## 2018-06-13 PROCEDURE — 97140 MANUAL THERAPY 1/> REGIONS: CPT

## 2018-06-13 NOTE — PROGRESS NOTES
Daily Note     Today's date: 2018  Patient name: Dae Fowler  : 1945  MRN: [id number]  Referring provider: Steve Lake  Dx:   Encounter Diagnosis     ICD-10-CM    1  Chronic right-sided low back pain without sciatica M54 5     G89 29                   Subjective: Pt reports she felt better after last session  Pain = 3/10 in right low back  Reports less muscle cramping in LE's    Objective: See treatment diary below  There  Ex: 25 min  Bridges 20x  Clam shells black t-band 20x  RFIL with yellow ball 20x  Lats with green tubing 20x  Rows with green tubing 20x    Manual 15 min  Lumbar flex/rot mob bilateral gr 3  STM/TFM right glut/piriformis  Bilateral LE stretching    Assessment: Tolerated treatment well  Patient would benefit from continued PT      Plan: Continue per plan of care

## 2018-06-15 ENCOUNTER — OFFICE VISIT (OUTPATIENT)
Dept: PHYSICAL THERAPY | Facility: CLINIC | Age: 73
End: 2018-06-15
Payer: MEDICARE

## 2018-06-15 DIAGNOSIS — G89.29 CHRONIC RIGHT-SIDED LOW BACK PAIN WITHOUT SCIATICA: Primary | ICD-10-CM

## 2018-06-15 DIAGNOSIS — M54.50 CHRONIC RIGHT-SIDED LOW BACK PAIN WITHOUT SCIATICA: Primary | ICD-10-CM

## 2018-06-15 PROCEDURE — 97110 THERAPEUTIC EXERCISES: CPT

## 2018-06-15 PROCEDURE — 97140 MANUAL THERAPY 1/> REGIONS: CPT

## 2018-06-15 NOTE — PROGRESS NOTES
Daily Note     Today's date: 6/15/2018  Patient name: Maria Elena Delcid  : 1945  MRN: [id number]  Referring provider: Juliane Freeze  Dx:   Encounter Diagnosis     ICD-10-CM    1  Chronic right-sided low back pain without sciatica M54 5     G89 29                   Subjective: Pt reports has been having increased cramping in LE's this morning  Right hip pain = 4/10    Objective: See treatment diary below  There  Ex: 25 min  Bridges 20x  Clam shells black t-band 20x  RFIL with yellow ball 20x  Lats with green tubing 20x  Rows with green tubing 20x    Manual 15 min  Lumbar flex/rot mob bilateral gr 3  STM/TFM right glut/piriformis  Bilateral LE stretching    Assessment: Pt experienced several muscle cramps in legs, ribs, and back during there ex  Cramping was short lived and resolved with gentle stretching but definitely inhibited session  Pt encouraged to speak to MD regarding cramping    Patient would benefit from continued PT      Plan: Continue per plan of care

## 2018-06-18 ENCOUNTER — OFFICE VISIT (OUTPATIENT)
Dept: PHYSICAL THERAPY | Facility: CLINIC | Age: 73
End: 2018-06-18
Payer: MEDICARE

## 2018-06-18 DIAGNOSIS — M54.50 CHRONIC RIGHT-SIDED LOW BACK PAIN WITHOUT SCIATICA: Primary | ICD-10-CM

## 2018-06-18 DIAGNOSIS — G89.29 CHRONIC RIGHT-SIDED LOW BACK PAIN WITHOUT SCIATICA: Primary | ICD-10-CM

## 2018-06-18 PROCEDURE — 97140 MANUAL THERAPY 1/> REGIONS: CPT

## 2018-06-18 PROCEDURE — 97110 THERAPEUTIC EXERCISES: CPT

## 2018-06-18 NOTE — PROGRESS NOTES
Daily Note     Today's date: 2018  Patient name: Chuy Chamberlain  : 1945  MRN: [id number]  Referring provider: Winfred Opitz  Dx:   Encounter Diagnosis     ICD-10-CM    1  Chronic right-sided low back pain without sciatica M54 5     G89 29                 Session: 4  POC expires: 18  Re-Eval: 18    Subjective: Pt reports muscle cramping has been the same but low back and right hip pain feel better = 2/10    Objective: See treatment diary below  There  Ex: 25 min  Bridges 20x  Clam shells black t-band 20x  RFIL with peanut l 20x  Lats 20lbs  20x  Rows with 20 lbs 20x  Sit - stand 20x    Manual 15 min  Lumbar flex/rot mob bilateral gr 3  STM/TFM right glut/piriformis  Bilateral LE stretching    Assessment: Pt will claudine LANDEROS regarding cramping this week or next   Patient would benefit from continued PT      Plan: Continue per plan of care

## 2018-06-20 ENCOUNTER — OFFICE VISIT (OUTPATIENT)
Dept: PHYSICAL THERAPY | Facility: CLINIC | Age: 73
End: 2018-06-20
Payer: MEDICARE

## 2018-06-20 DIAGNOSIS — G89.29 CHRONIC RIGHT-SIDED LOW BACK PAIN WITHOUT SCIATICA: Primary | ICD-10-CM

## 2018-06-20 DIAGNOSIS — M54.50 CHRONIC RIGHT-SIDED LOW BACK PAIN WITHOUT SCIATICA: Primary | ICD-10-CM

## 2018-06-20 PROCEDURE — 97110 THERAPEUTIC EXERCISES: CPT

## 2018-06-20 PROCEDURE — 97530 THERAPEUTIC ACTIVITIES: CPT

## 2018-06-20 NOTE — PROGRESS NOTES
Daily Note     Today's date: 2018  Patient name: Margarita Alatorre  : 1945  MRN: [id number]  Referring provider: Yamel Allen  Dx:   Encounter Diagnosis     ICD-10-CM    1  Chronic right-sided low back pain without sciatica M54 5     G89 29                 Session: 5  POC expires: 18  Re-Eval: 18    Subjective: Pt reports she has not been able to sleep more than 3-4 hours and tried an herbal supplement last night but it did not help  Pt reports she was helping her  unload items from his truck this morning and is experiencing increased pain in left low back = 7/10  Objective: See treatment diary below  There  Ex: 15 min  Pt attempted bridges but immediately experienced muscle cramping in bilateral LE's low/mid back and ribs  Pt was unable to tolerate any exercise in lying  Cramping was relieved with standing  Sit - stand 20x  Bilateral LE stretching    There Activity: 20 min  Pt was educated on speaking with MD prior to trying any herbal supplements as there may be interaction with her medication  Pt was also encouraged to call MD to inform him that the cramping she experiences on a daily basis is adversely effecting her quality of life, as patient was in tears for majority of session due to the limitations her muscle cramps were causing    Assessment: Pt will see MD regarding cramping this week or next   Patient would benefit from continued PT      Plan: Continue per plan of care

## 2018-06-27 ENCOUNTER — OFFICE VISIT (OUTPATIENT)
Dept: PHYSICAL THERAPY | Facility: CLINIC | Age: 73
End: 2018-06-27
Payer: MEDICARE

## 2018-06-27 DIAGNOSIS — M54.50 CHRONIC RIGHT-SIDED LOW BACK PAIN WITHOUT SCIATICA: Primary | ICD-10-CM

## 2018-06-27 DIAGNOSIS — G89.29 CHRONIC RIGHT-SIDED LOW BACK PAIN WITHOUT SCIATICA: Primary | ICD-10-CM

## 2018-06-27 PROCEDURE — 97110 THERAPEUTIC EXERCISES: CPT

## 2018-06-27 NOTE — PROGRESS NOTES
Daily Note     Today's date: 2018  Patient name: Iris Garcia  : 1945  MRN: [id number]  Referring provider: Chaya Brandon  Dx:   Encounter Diagnosis     ICD-10-CM    1  Chronic right-sided low back pain without sciatica M54 5     G89 29                 Session: 6  POC expires: 18  Re-Eval: 18    Subjective: Pt reports no significant change in low back pain or muscle cramping  Will see PCP Friday morning  Pain = 1-2/10 currently    Objective: See treatment diary below  There  Ex: 25 min  Bridges 20x  Clam shells blue t-band 20x  LE lowering 2 x 10  Sit - stand 20x  TRX Squats 2 x 10  Rows with blue tubing 2 x 10  Lats with green tubing 2 x 10    Manual 10 min  Lumbar flex/rot mob gr 3  Lumbar rot mob gr 3   Bilateral LE stretching    Assessment: Pt able to perform there ex without excessive muscle cramoing Patient would benefit from continued PT      Plan: Continue per plan of care

## 2018-06-29 ENCOUNTER — OFFICE VISIT (OUTPATIENT)
Dept: PHYSICAL THERAPY | Facility: CLINIC | Age: 73
End: 2018-06-29
Payer: MEDICARE

## 2018-06-29 DIAGNOSIS — G89.29 CHRONIC RIGHT-SIDED LOW BACK PAIN WITHOUT SCIATICA: Primary | ICD-10-CM

## 2018-06-29 DIAGNOSIS — M54.50 CHRONIC RIGHT-SIDED LOW BACK PAIN WITHOUT SCIATICA: Primary | ICD-10-CM

## 2018-06-29 PROCEDURE — 97110 THERAPEUTIC EXERCISES: CPT

## 2018-06-29 PROCEDURE — 97140 MANUAL THERAPY 1/> REGIONS: CPT

## 2018-06-29 NOTE — PROGRESS NOTES
Daily Note     Today's date: 2018  Patient name: Mei Del Rosario  : 1945  MRN: [id number]  Referring provider: Wilman Bell  Dx:   Encounter Diagnosis     ICD-10-CM    1  Chronic right-sided low back pain without sciatica M54 5     G89 29                 Session: 7  POC expires: 18  Re-Eval: 18    Subjective: Pt reports saw PCP this morning, had EKG and blood work due to chest pains, MD stated all tests were normal  Pt reports she was given an Rx for Baclofen for muscle cramping  Pt reports her whole body is sore today but unable to provide a number    Objective: See treatment diary below  There  Ex: 25 min  Bridges 20x  Clam shells blue t-band 20x  LE lowering 2 x 10  Sit - stand 20x  Rows with blue tubing 2 x 10  Lats with green tubing 2 x 10    Manual 15 min  Lumbar flex/rot mob gr 3  Lumbar rot mob gr 3   Bilateral LE stretching  SI jt mobs Gr 3-4    Assessment: Pt able to perform there ex without excessive muscle cramoing Patient would benefit from continued PT  Pt reported feeling much better after session  Denies any pain, still unable to provide a number on 0-10 scale      Plan: Continue per plan of care

## 2018-07-03 ENCOUNTER — OFFICE VISIT (OUTPATIENT)
Dept: PHYSICAL THERAPY | Facility: CLINIC | Age: 73
End: 2018-07-03
Payer: MEDICARE

## 2018-07-03 DIAGNOSIS — M54.50 CHRONIC RIGHT-SIDED LOW BACK PAIN WITHOUT SCIATICA: Primary | ICD-10-CM

## 2018-07-03 DIAGNOSIS — G89.29 CHRONIC RIGHT-SIDED LOW BACK PAIN WITHOUT SCIATICA: Primary | ICD-10-CM

## 2018-07-03 PROCEDURE — G8979 MOBILITY GOAL STATUS: HCPCS

## 2018-07-03 PROCEDURE — 97110 THERAPEUTIC EXERCISES: CPT

## 2018-07-03 PROCEDURE — 97140 MANUAL THERAPY 1/> REGIONS: CPT

## 2018-07-03 PROCEDURE — G8978 MOBILITY CURRENT STATUS: HCPCS

## 2018-07-03 NOTE — PROGRESS NOTES
Daily Note     Today's date: 7/3/2018  Patient name: Golden Harvey  : 1945  MRN: [id number]  Referring provider: Carmen Olivares  Dx:   Encounter Diagnosis     ICD-10-CM    1  Chronic right-sided low back pain without sciatica M54 5     G89 29                 Session: 8  POC expires: 18  Re-Eval: 18    Subjective: Pt reports feeling better with medication    Objective: See treatment diary below  There  Ex: 15 min  Bridges 20x  Clam shells blue t-band 20x  LE lowering 2 x 10  Sit - stand 20x  Rows with blue tubing 2 x 10  Lats with green tubing 2 x 10    Manual 15 min  Lumbar flex/rot mob gr 3  Lumbar rot mob gr 3   Bilateral LE stretching  SI jt mobs Gr 3-4    Assessment: Pt able to perform there ex without excessive muscle cramoing Patient would benefit from continued PT  Plan: Continue per plan of care

## 2018-07-06 ENCOUNTER — HOSPITAL ENCOUNTER (EMERGENCY)
Facility: HOSPITAL | Age: 73
Discharge: HOME | End: 2018-07-06
Attending: EMERGENCY MEDICINE | Admitting: EMERGENCY MEDICINE
Payer: MEDICARE

## 2018-07-06 VITALS
OXYGEN SATURATION: 97 % | RESPIRATION RATE: 20 BRPM | SYSTOLIC BLOOD PRESSURE: 130 MMHG | HEART RATE: 78 BPM | DIASTOLIC BLOOD PRESSURE: 81 MMHG | TEMPERATURE: 98.6 F

## 2018-07-06 DIAGNOSIS — M62.838 MUSCLE SPASMS OF BOTH LOWER EXTREMITIES: Primary | ICD-10-CM

## 2018-07-06 PROCEDURE — 99283 EMERGENCY DEPT VISIT LOW MDM: CPT

## 2018-07-06 RX ORDER — METHOCARBAMOL 500 MG/1
500 TABLET, FILM COATED ORAL ONCE
Status: COMPLETED | OUTPATIENT
Start: 2018-07-06 | End: 2018-07-06

## 2018-07-06 RX ORDER — METHOCARBAMOL 500 MG/1
500 TABLET, FILM COATED ORAL 2 TIMES DAILY
Qty: 20 TABLET | Refills: 0 | Status: SHIPPED | OUTPATIENT
Start: 2018-07-06 | End: 2019-01-03

## 2018-07-06 RX ADMIN — METHOCARBAMOL 500 MG: 500 TABLET ORAL at 18:31

## 2018-07-06 NOTE — ED PROVIDER NOTES
History  Chief Complaint   Patient presents with    Spasms     states having spasms on sides  back and groin for months  put on baclofen with no relief     Patient states that she suffers from back spasms and has been having a for months  She has been on muscle relaxers prescribed by her doctor without much relief  She finds that the spasms can come in different parts of the day either at rest or with exertion  Patient is becoming frustrated not sure what to do  She is here at the behest of a friend  to get checked out  Patient has only been on the 1 medication for this  She is considering chiropractic but has not gone and wants to know what I think of that idea            Prior to Admission Medications   Prescriptions Last Dose Informant Patient Reported? Taking? NON FORMULARY   Yes No   Sig: daily at bedtime Stool softner   NON FORMULARY   Yes No   Si capsule daily at bedtime as needed Oxy powder-   albuterol (ACCUNEB) 0 63 MG/3ML nebulizer solution   Yes No   Sig: Take 1 ampule by nebulization every 6 (six) hours as needed for wheezing   albuterol (PROVENTIL HFA,VENTOLIN HFA) 90 mcg/act inhaler   Yes No   Sig: Inhale 2 puffs every 6 (six) hours as needed for wheezing     aspirin 81 mg chewable tablet   Yes No   Sig: Chew 81 mg every morning     baclofen 20 mg tablet   Yes No   Sig: Take 10 mg by mouth 2 (two) times a day     cycloSPORINE (RESTASIS) 0 05 % ophthalmic emulsion   Yes No   Sig: Administer 1 drop to both eyes 2 (two) times a day     dexamethasone 0 5 mg/mL SUSP   Yes No   donepezil (ARICEPT) 5 mg tablet   Yes No   Sig: Take 5 mg by mouth daily   famotidine (PEPCID) 20 mg tablet   Yes No   Sig: Take 20 mg by mouth 2 (two) times a day   iron polysaccharides (FERREX 150) 150 mg capsule   Yes No   Sig: Take 150 mg by mouth daily     levothyroxine 100 mcg tablet   No No   Sig: Take 1 tablet by mouth every morning   melatonin 3 mg   Yes No   Sig: Take 3 mg by mouth daily at bedtime as needed multivitamin (THERAGRAN) TABS   Yes No   Sig: Take 1 tablet by mouth every morning      Facility-Administered Medications: None       Past Medical History:   Diagnosis Date    Allergic rhinitis     Last Assessed:10/22/2014    Alzheimer disease     Angina pectoris ([address] )     Ankle fracture, right     casted    Anxiety     Arthritis     Asthma     Cisneros esophagus     Bursitis     Constipation     Cystitis     chronic    Diverticulosis     Fibromyalgia     Fibromyalgia, primary     GERD (gastroesophageal reflux disease)     H/o Lyme disease     Hiatal hernia     History of Clostridium difficile infection     Hyperlipidemia     Hypothyroid     hypo    IBS (irritable bowel syndrome)     Labral tear of shoulder     left    Lichen sclerosus of female genitalia 2016    Murmur     Neck pain, chronic     gets steroid injections-none since late 2016    Neuralgia     Oral lichen planus     Peritonitis (HCC)     Spinal stenosis     Ulcer     Ulcer of gastric fundus     Wears glasses     Wears partial dentures     upper and lower       Past Surgical History:   Procedure Laterality Date    ABDOMINAL SURGERY      exploratory-removal of appendix    APPENDECTOMY      CARDIAC CATHETERIZATION      CARPAL TUNNEL RELEASE Bilateral     CHOLECYSTECTOMY      COLONOSCOPY      sigmoid diverticulosis,5mm rectal tubular adenoma5/06    DILATION AND CURETTAGE OF UTERUS      x3 miscarriages    FOOT SURGERY Right     5th toe-hammertoe repair    HYSTERECTOMY  01/09/2012    complete ; for bleeding,tubal ligation,vaginal prolapse and rectocele repair    OVARIAN CYST REMOVAL      ID COLONOSCOPY FLX DX W/COLLJ SPEC WHEN PFRMD N/A 10/23/2017    Procedure: EGD AND COLONOSCOPY;  Surgeon: Live Bautista MD;  Location: [facility];   Service: Gastroenterology    ID DAR Fischer Left 11/13/2017    Procedure: SHOULDER ARTHROSCOPY WITH SUBACROMIAL DECOMPRESSION, ROTATOR CUFF REPAIR;  Surgeon: Anuradha Leonardo MD;  Location: [facility] OR;  Service: Orthopedics    ROTATOR CUFF REPAIR Right     TONSILLECTOMY         Family History   Problem Relation Age of Onset    Cancer Mother         colon    Ulcerative colitis Mother     Alzheimer's disease Father     Heart disease Sister         MI x4-angioplasty x4 stents    Colonic polyp Sister     Ulcerative colitis Sister    Evelyn Day Cancer Brother         brain tumor-age 6    Cancer Brother 72        prostate    Arthritis Family     Osteoarthritis Family     Crohn's disease Neg Hx     Liver cancer Neg Hx      I have reviewed and agree with the history as documented  Social History   Substance Use Topics    Smoking status: Never Smoker    Smokeless tobacco: Never Used    Alcohol use No      Comment: stopped drinking 9 years ago        Review of Systems   Constitutional: Negative for chills and fever  HENT: Negative for sore throat  Respiratory: Negative for shortness of breath  Cardiovascular: Negative for chest pain  Gastrointestinal: Negative for abdominal pain  Genitourinary: Positive for flank pain  Negative for dysuria  Musculoskeletal: Positive for arthralgias, back pain and myalgias  Neurological: Negative for syncope  Hematological: Does not bruise/bleed easily  All other systems reviewed and are negative  Physical Exam  Physical Exam   Constitutional: She is oriented to person, place, and time  She appears well-developed and well-nourished  HENT:   Head: Normocephalic and atraumatic  Mouth/Throat: Oropharynx is clear and moist    Eyes: Conjunctivae are normal    Neck: Normal range of motion  Neck supple  Cardiovascular: Normal rate, regular rhythm and normal heart sounds  Pulmonary/Chest: Effort normal and breath sounds normal    Abdominal: Soft  There is no tenderness  Musculoskeletal: Normal range of motion  She exhibits tenderness     Patient has some tenderness in the paraspinal musculature on either side   Neurological: She is alert and oriented to person, place, and time  Skin: Skin is warm and dry  Psychiatric: She has a normal mood and affect  Her behavior is normal    Nursing note and vitals reviewed  Vital Signs  ED Triage Vitals [07/06/18 1656]   Temperature Pulse Respirations Blood Pressure SpO2   98 6 °F (37 °C) 78 20 130/81 97 %      Temp Source Heart Rate Source Patient Position - Orthostatic VS BP Location FiO2 (%)   Tympanic Monitor Sitting Right arm --      Pain Score       9           Vitals:    07/06/18 1656   BP: 130/81   Pulse: 78   Patient Position - Orthostatic VS: Sitting       Visual Acuity      ED Medications  Medications   methocarbamol (ROBAXIN) tablet 500 mg (500 mg Oral Given 7/6/18 1831)       Diagnostic Studies  Results Reviewed     None                 No orders to display              Procedures  Procedures       Phone Contacts  ED Phone Contact    ED Course                               MDM  Number of Diagnoses or Management Options  Muscle spasms of both lower extremities:   Diagnosis management comments: A ball not quite sure of the etiology of his spasms the patient is having as suggested trying a different muscle relaxer and I agreed that perhaps chiropractic manipulation may offer some symptomatic relief    CritCare Time    Disposition  Final diagnoses:   Muscle spasms of both lower extremities     Time reflects when diagnosis was documented in both MDM as applicable and the Disposition within this note     Time User Action Codes Description Comment    7/6/2018  6:14 PM Soledad Mart Add [U67 [id number]] Muscle spasms of both lower extremities       ED Disposition     ED Disposition Condition Comment    Discharge  [facility] discharge to home/self care      Condition at discharge: Stable        Follow-up Information     Follow up With Specialties Details Why Contact Info    Lisandro Shaikh MD Family Medicine Schedule an appointment as soon as possible for a visit in 1 day As needed [id number] [facility] [address]  [phone]            Discharge Medication List as of 7/6/2018  6:23 PM      START taking these medications    Details   methocarbamol (ROBAXIN) 500 mg tablet Take 1 tablet (500 mg total) by mouth 2 (two) times a day, Starting Fri 7/6/2018, Normal         CONTINUE these medications which have NOT CHANGED    Details   albuterol (ACCUNEB) 0 63 MG/3ML nebulizer solution Take 1 ampule by nebulization every 6 (six) hours as needed for wheezing, Historical Med      albuterol (PROVENTIL HFA,VENTOLIN HFA) 90 mcg/act inhaler Inhale 2 puffs every 6 (six) hours as needed for wheezing , Until Discontinued, Historical Med      aspirin 81 mg chewable tablet Chew 81 mg every morning  , Historical Med      baclofen 20 mg tablet Take 10 mg by mouth 2 (two) times a day  , Historical Med      cycloSPORINE (RESTASIS) 0 05 % ophthalmic emulsion Administer 1 drop to both eyes 2 (two) times a day  , Historical Med      dexamethasone 0 5 mg/mL SUSP Historical Med      donepezil (ARICEPT) 5 mg tablet Take 5 mg by mouth daily, Starting Mon 3/5/2018, Historical Med      famotidine (PEPCID) 20 mg tablet Take 20 mg by mouth 2 (two) times a day, Historical Med      iron polysaccharides (FERREX 150) 150 mg capsule Take 150 mg by mouth daily  , Historical Med      levothyroxine 100 mcg tablet Take 1 tablet by mouth every morning, Starting Wed 10/11/2017, Print      melatonin 3 mg Take 3 mg by mouth daily at bedtime as needed, Historical Med      multivitamin (THERAGRAN) TABS Take 1 tablet by mouth every morning, Historical Med      !! NON FORMULARY daily at bedtime Stool softner, Historical Med      !! NON FORMULARY 1 capsule daily at bedtime as needed Oxy powder-, Historical Med       !! - Potential duplicate medications found  Please discuss with provider  No discharge procedures on file      ED Provider  Electronically Signed by           B[place] MD  07/12/18 4018

## 2018-07-06 NOTE — DISCHARGE INSTRUCTIONS
Muscle Spasm   WHAT YOU NEED TO KNOW:   A muscle spasm is a sudden contraction of any muscle or group of muscles  A muscle cramp is a painful muscle spasm  Muscle cramps commonly occur after intense exercise or during pregnancy  They may also be caused by certain medications, dehydration, low calcium or magnesium levels, or another medical condition  DISCHARGE INSTRUCTIONS:   Medicines: You may need the following:  · NSAIDs  help decrease swelling and pain or fever  This medicine is available with or without a doctor's order  NSAIDs can cause stomach bleeding or kidney problems in certain people  If you take blood thinner medicine, always ask your healthcare provider if NSAIDs are safe for you  Always read the medicine label and follow directions  · Take your medicine as directed  Contact your healthcare provider if you think your medicine is not helping or if you have side effects  Tell him of her if you are allergic to any medicine  Keep a list of the medicines, vitamins, and herbs you take  Include the amounts, and when and why you take them  Bring the list or the pill bottles to follow-up visits  Carry your medicine list with you in case of an emergency  Follow up with your healthcare provider as directed: You may need other tests or treatment  You may also be referred to a physical therapist or other specialist  Write down your questions so you remember to ask them during your visits  Self-care:   · Stretch  your muscle to help relieve the cramp  It may be helpful to keep your muscle in the stretched position until the cramp is gone  · Apply heat  to help decrease pain and muscle spasms  Apply heat on the area for 20 to 30 minutes every 2 hours for as many days as directed  · Apply ice  to help decrease swelling and pain  Ice may also help prevent tissue damage  Use an ice pack, or put crushed ice in a plastic bag   Cover it with a towel and place it on your muscle for 15 to 20 minutes every hour or as directed  · Drink more liquids  to help prevent muscle cramps caused by dehydration  Sports drinks may help replace electrolytes you lose through sweat during exercise  Ask your healthcare provider how much liquid to drink each day and which liquids are best for you  · Eat healthy foods , such as fruits, vegetables, whole grains, low-fat dairy products, and lean proteins (meat, beans, and fish)  If you are pregnant, ask your healthcare provider about foods that are high in magnesium and sodium  They may help to relieve cramps during pregnancy  · Massage your muscle  to help relieve the cramp  · Take frequent deep breaths  until the cramp feels better  Lie down while you take the deep breaths so you do not get dizzy or lightheaded  Contact your healthcare provider if:   · You have signs of dehydration, such as a headache, dark yellow urine, dry eyes or mouth, or a fast heartbeat  · You have questions or concerns about your condition or care  Return to the emergency department if:   · You have warmth, swelling, or redness in the cramping muscle  · You have frequent or unrelieved muscle cramps in several different muscles  · You have muscle cramps with numbness, tingling, and burning in your hands and feet  © 2017 [address] Information is for End User's use only and may not be sold, redistributed or otherwise used for commercial purposes  All illustrations and images included in CareNotes® are the copyrighted property of [employer] , [employer]  or [employer]  The above information is an  only  It is not intended as medical advice for individual conditions or treatments  Talk to your doctor, nurse or pharmacist before following any medical regimen to see if it is safe and effective for you

## 2018-07-09 ENCOUNTER — VBI (OUTPATIENT)
Dept: ADMINISTRATIVE | Facility: OTHER | Age: 73
End: 2018-07-09

## 2018-07-09 NOTE — TELEPHONE ENCOUNTER
NO OUTREACH FROM [facility] [place] - PATIENT CALLED AND SCHEDULED A FOLLOW UP APPT ON 7/17/18

## 2018-08-31 ENCOUNTER — OFFICE VISIT (OUTPATIENT)
Dept: OBGYN CLINIC | Facility: CLINIC | Age: 73
End: 2018-08-31
Payer: MEDICARE

## 2018-08-31 VITALS
SYSTOLIC BLOOD PRESSURE: 112 MMHG | HEART RATE: 91 BPM | DIASTOLIC BLOOD PRESSURE: 75 MMHG | HEIGHT: 60 IN | BODY MASS INDEX: 30.04 KG/M2 | WEIGHT: 153 LBS

## 2018-08-31 DIAGNOSIS — M16.11 PRIMARY OSTEOARTHRITIS OF ONE HIP, RIGHT: ICD-10-CM

## 2018-08-31 DIAGNOSIS — M54.50 CHRONIC BILATERAL LOW BACK PAIN WITHOUT SCIATICA: ICD-10-CM

## 2018-08-31 DIAGNOSIS — M70.61 GREATER TROCHANTERIC BURSITIS OF RIGHT HIP: Primary | ICD-10-CM

## 2018-08-31 DIAGNOSIS — G89.29 CHRONIC BILATERAL LOW BACK PAIN WITHOUT SCIATICA: ICD-10-CM

## 2018-08-31 DIAGNOSIS — M46.1 SACROILIITIS (HCC): ICD-10-CM

## 2018-08-31 PROCEDURE — 99213 OFFICE O/P EST LOW 20 MIN: CPT | Performed by: ORTHOPAEDIC SURGERY

## 2018-08-31 NOTE — PROGRESS NOTES
Assessment/Plan:  1  Greater trochanteric bursitis of right hip  Ambulatory referral to Physical Therapy    diclofenac sodium (VOLTAREN) 1 %   2  Sacroiliitis [facility])  Ambulatory referral to Physical Therapy   3  Primary osteoarthritis of one hip, right  Ambulatory referral to Physical Therapy   4  Chronic bilateral low back pain without sciatica  Ambulatory referral to Physical Therapy     Melissa Garcia is a pleasant 75year old female with low back pain consistent with sacroilitis and low back spasm and ongoing right hip pain consistent with greater trochanteric bursitis  She also has right hip osteoarthritis, but is not significantly symptomatic of that at this time  We had a brief discussion with her about escalating care to cortisone injections, but since she has not yet demonstrated failure of conservative treatment, we would like to give it another try  She was provided with a referral to formal physical therapy to address her low back, SI joints, and right hip  She was also given another prescription for voltaren gel that she can use up to 4 times daily on her greater trochanteric bursitis  We would like to see her back in 3 months for clinical reevaluation  She had two friends with her today that also expressed understanding of this plan and can reaffirm it with Melissa Garcia as needed  All questions addressed today  Subjective: Back and hip follow up    Patient ID: Patt Maloeny is a 75 y o  female  Melissa Garcia is a pleasant 75year old female with Alzheimer's presenting today for follow up of her low back and right hip pain  At her last appointment, she was referred to physical therapy, which she attended briefly but stopped due to muscle spasm  She does not believe that she got a prescription for the voltaren gel and does not have any at home  She continues to complain of the same discomfort in the low back and lateral right hip  She would like to return to PT  Review of Systems   Constitutional: Negative  HENT: Negative  Eyes: Negative  Respiratory: Negative  Cardiovascular: Negative  Gastrointestinal: Negative  Endocrine: Positive for polyuria  Genitourinary: Negative  Musculoskeletal: Positive for myalgias  Skin: Negative  Allergic/Immunologic: Negative  Neurological: Negative  Hematological: Negative  Psychiatric/Behavioral: Positive for decreased concentration  The patient is nervous/anxious            Past Medical History:   Diagnosis Date    Allergic rhinitis     Last Assessed:10/22/2014    Alzheimer disease     Angina pectoris (HCC)     Ankle fracture, right     casted    Anxiety     Arthritis     Asthma     Cisneros esophagus     Bursitis     Constipation     Cystitis     chronic    Diverticulosis     Fibromyalgia     Fibromyalgia, primary     GERD (gastroesophageal reflux disease)     H/o Lyme disease     Hiatal hernia     History of Clostridium difficile infection     Hyperlipidemia     Hypothyroid     hypo    IBS (irritable bowel syndrome)     Labral tear of shoulder     left    Lichen sclerosus of female genitalia 2016    Murmur     Neck pain, chronic     gets steroid injections-none since late 2016    Neuralgia     Oral lichen planus     Peritonitis (HCC)     Spinal stenosis     Ulcer     Ulcer of gastric fundus     Wears glasses     Wears partial dentures     upper and lower       Past Surgical History:   Procedure Laterality Date    ABDOMINAL SURGERY      exploratory-removal of appendix    APPENDECTOMY      CARDIAC CATHETERIZATION      CARPAL TUNNEL RELEASE Bilateral     CHOLECYSTECTOMY      COLONOSCOPY      sigmoid diverticulosis,5mm rectal tubular adenoma5/06    DILATION AND CURETTAGE OF UTERUS      x3 miscarriages    FOOT SURGERY Right     5th toe-hammertoe repair    HYSTERECTOMY  01/09/2012    complete ; for bleeding,tubal ligation,vaginal prolapse and rectocele repair    OVARIAN CYST REMOVAL      DE COLONOSCOPY FLX DX W/COLLJ SPEC WHEN PFRMD N/A 10/23/2017    Procedure: EGD AND COLONOSCOPY;  Surgeon: Lynne Manzo MD;  Location: [facility];   Service: Gastroenterology    AL DAR Alves Left 11/13/2017    Procedure: SHOULDER ARTHROSCOPY WITH SUBACROMIAL DECOMPRESSION, ROTATOR CUFF REPAIR;  Surgeon: Joey Tobias MD;  [facility] MAIN OR;  Service: Orthopedics    ROTATOR CUFF REPAIR Right     TONSILLECTOMY         Family History   Problem Relation Age of Onset    Cancer Mother         colon    Ulcerative colitis Mother     Alzheimer's disease Father     Heart disease Sister         MI x4-angioplasty x4 stents    Colonic polyp Sister     Ulcerative colitis Sister     Cancer Brother         brain tumor-age 11    Cancer Brother 72        prostate    Arthritis Family     Osteoarthritis Family     Crohn's disease Neg Hx     Liver cancer Neg Hx        Social History     Occupational History    retired      Social History Main Topics    Smoking status: Never Smoker    Smokeless tobacco: Never Used    Alcohol use No      Comment: stopped drinking 9 years ago    Drug use: No    Sexual activity: No         Current Outpatient Prescriptions:     albuterol (ACCUNEB) 0 63 MG/3ML nebulizer solution, Take 1 ampule by nebulization every 6 (six) hours as needed for wheezing, Disp: , Rfl:     albuterol (PROVENTIL HFA,VENTOLIN HFA) 90 mcg/act inhaler, Inhale 2 puffs every 6 (six) hours as needed for wheezing , Disp: , Rfl:     aspirin 81 mg chewable tablet, Chew 81 mg every morning  , Disp: , Rfl:     baclofen 20 mg tablet, Take 10 mg by mouth 2 (two) times a day  , Disp: , Rfl:     cycloSPORINE (RESTASIS) 0 05 % ophthalmic emulsion, Administer 1 drop to both eyes 2 (two) times a day  , Disp: , Rfl:     dexamethasone 0 5 mg/mL SUSP, , Disp: , Rfl:     donepezil (ARICEPT) 5 mg tablet, Take 5 mg by mouth daily, Disp: , Rfl: 5    famotidine (PEPCID) 20 mg tablet, Take 20 mg by mouth 2 (two) times a day, Disp: , Rfl:     iron polysaccharides (FERREX 150) 150 mg capsule, Take 150 mg by mouth daily  , Disp: , Rfl:     levothyroxine 100 mcg tablet, Take 1 tablet by mouth every morning, Disp: 30 tablet, Rfl: 1    melatonin 3 mg, Take 3 mg by mouth daily at bedtime as needed, Disp: , Rfl:     methocarbamol (ROBAXIN) 500 mg tablet, Take 1 tablet (500 mg total) by mouth 2 (two) times a day, Disp: 20 tablet, Rfl: 0    multivitamin (THERAGRAN) TABS, Take 1 tablet by mouth every morning, Disp: , Rfl:     NON FORMULARY, daily at bedtime Stool softner, Disp: , Rfl:     NON FORMULARY, 1 capsule daily at bedtime as needed Oxy powder-, Disp: , Rfl:     diclofenac sodium (VOLTAREN) 1 %, Apply 2 g topically 4 (four) times a day, Disp: 1 Tube, Rfl: 2    Allergies   Allergen Reactions    Omnipaque [Iohexol] Other (See Comments)     Shakes, "passed out"    Pneumovax 23 [Pneumococcal Vac Polyvalent] Hives    Iodine     Iv Dye  [Iodinated Diagnostic Agents] Confusion     Annotation - [id number]: shaking    Aspirin GI Intolerance and Nausea Only     Reaction Date: [id number]; Annotation - [id number]: 325mg causes bleeding  Cannot take a dose higher than 81mg-pt has a hx of ulcer    Codeine GI Intolerance     N/V    Flexeril [Cyclobenzaprine]      Unknown reaction per pt  Allergy was noted on physicians note    Latex Rash    Other Rash     Adhesive Tape-caused a rash       Objective:  Vitals:    08/31/18 0857   BP: 112/75   Pulse: 91       Body mass index is 29 88 kg/m²  Right Hip Exam     Other   Erythema: absent  Scars: absent  Sensation: normal  Pulse: present    Comments:  Examination of her low back and right hip reveals tenderness over the posterior superior iliac spine, right-sided paraspinal muscles, and over the right hip greater trochanteric bursa  There is no anterior hip tenderness  She does not have any limitations in range of motion passively of the right hip   She does have a Positive impingement and Cher sign  She has no discomfort with log rolling of the right hip  She has negative Stingefield test   She has a positive Josh's test   Her thigh and calf remained soft and nontender  She has discomfort with compression of the pelvis at the SI joint but decreased  She ambulates with a slightly antalgic gait on the right and she is grossly distally neurovascularly unchanged  Negative neuro straight leg raise          Neurological Testing     Additional Neurological Details  Difficulty recalling historical details/previous treatments      Physical Exam   Constitutional: She appears well-developed and well-nourished  Body mass index is 29 88 kg/m²  HENT:   Head: Normocephalic and atraumatic  Eyes: EOM are normal    Neck: Normal range of motion  Cardiovascular: Intact distal pulses  Pulmonary/Chest: Effort normal    Musculoskeletal:   See ortho exam   Neurological: She is alert  Difficulty recalling historical details/previous treatments   Skin: Skin is warm and dry  Psychiatric: She has a normal mood and affect   Her behavior is normal  Judgment and thought content normal

## 2018-09-04 ENCOUNTER — OFFICE VISIT (OUTPATIENT)
Dept: PHYSICAL THERAPY | Facility: CLINIC | Age: 73
End: 2018-09-04
Payer: MEDICARE

## 2018-09-04 DIAGNOSIS — M16.11 PRIMARY OSTEOARTHRITIS OF RIGHT HIP: ICD-10-CM

## 2018-09-04 DIAGNOSIS — G89.29 CHRONIC RIGHT-SIDED LOW BACK PAIN WITHOUT SCIATICA: Primary | ICD-10-CM

## 2018-09-04 DIAGNOSIS — M70.61 TROCHANTERIC BURSITIS OF RIGHT HIP: ICD-10-CM

## 2018-09-04 DIAGNOSIS — M54.50 CHRONIC RIGHT-SIDED LOW BACK PAIN WITHOUT SCIATICA: Primary | ICD-10-CM

## 2018-09-04 PROCEDURE — G8978 MOBILITY CURRENT STATUS: HCPCS

## 2018-09-04 PROCEDURE — 97164 PT RE-EVAL EST PLAN CARE: CPT

## 2018-09-04 PROCEDURE — 97110 THERAPEUTIC EXERCISES: CPT

## 2018-09-04 PROCEDURE — G8979 MOBILITY GOAL STATUS: HCPCS

## 2018-09-04 NOTE — PROGRESS NOTES
PT Re-Evaluation     Today's date: 2018  Patient name: Abeba Mills  : 1945  MRN: [id number]  Referring provider: Charly Thompson  Dx:   Encounter Diagnosis     ICD-10-CM    1  Chronic right-sided low back pain without sciatica M54 5     G89 29    2  Trochanteric bursitis of right hip M70 61    3  Primary osteoarthritis of right hip M16 11                   Assessment  Impairments: abnormal muscle firing, abnormal or restricted ROM, activity intolerance, impaired physical strength, lacks appropriate home exercise program and pain with function    Assessment details: Abeba Mills is a 67 y o  female who presents with pain, decreased strength and decreased ROM  Due to these impairments, patient has difficulty performing ADL's, engaging in social activities and lifting/carrying  Patient's clinical presentation is consistent with their referring diagnosis of Chronic right-sided low back pain without sciatica  (primary encounter diagnosis)    Patient has been educated in home exercise program and plan of care  Patient would benefit from skilled physical therapy services to address their aforementioned functional limitations and progress towards prior level of function and independence with home exercise program    Understanding of Dx/Px/POC: fair   Prognosis: fair    Goals  Short Term Goals: Target Date 10/5/18  1  Initiate and advance HEP  2  Pt to report 4/10 pain at most in low back  3  Pt to sleep for 6 hours without being woken by low back pain    Long Term Goals: Target Date 18  1  Indep with HEP  2  Pt to reports 3/10 low back at worst  3  Pt to be able to change positions without increase in low back pain  4   Pt to resume vacuuming and sweeping with less than 3/10 low back pain      Plan  Patient would benefit from: skilled PT  Planned modality interventions: cryotherapy and thermotherapy: hydrocollator packs  Planned therapy interventions: joint mobilization, manual therapy, patient education, postural training, activity modification, abdominal trunk stabilization, body mechanics training, flexibility, functional ROM exercises, graded exercise, home exercise program, neuromuscular re-education, strengthening, stretching, therapeutic activities, therapeutic exercise, motor coordination training, muscle pump exercises, gait training, balance/weight bearing training and ADL training  Frequency: 3x week  Duration in weeks: 10  Treatment plan discussed with: patient        Subjective Evaluation    History of Present Illness  Date of onset: 2018  Mechanism of injury: Pt reports she changed medication a few weeks ago and muscle spasms have decreased   Pt reports she is ready to try PT again  Recurrent probem    Quality of life: good    Pain  No pain reported  Current pain ratin  At best pain ratin  At worst pain ratin  Location: Right Low Back, hip, and thigh  Quality: dull ache, radiating and throbbing  Relieving factors: medications  Progression: no change    Social Support  Steps to enter house: yes  Stairs in house: yes   Lives in: apartment  Lives with: spouse    Employment status: not working  Patient Goals  Patient goals for therapy: decreased pain      Pt reports history off Anemia and Alzheimers    Objective  Lumbar ROM:  Flexion in Standing = 25% loss painful  Extension in standing = 25% loss    Repeated Motion Testing:  RFIS = Increased pain, no worse  GRISELDA = Decreased pain, no better  RFIL = No effect    Right Hip ROM:  Flexion = WNL  Abduction = WFL  Int Rotation = moderate limitation  Ext Rotation = WFL    TTP in Right buttock and +++ in right Piriformis    Precautions: Dementia, Anemia    There ex: 15 min  NuStep 5 min  Bridges 20x  Hip add with ball 10 x 2 sec  RFIL  Bilateral LE stretching HS and piriformis    Manual: 5 min  Lumbar rotation mob gr 4-5

## 2018-09-07 ENCOUNTER — OFFICE VISIT (OUTPATIENT)
Dept: PHYSICAL THERAPY | Facility: CLINIC | Age: 73
End: 2018-09-07
Payer: MEDICARE

## 2018-09-07 DIAGNOSIS — G89.29 CHRONIC RIGHT-SIDED LOW BACK PAIN WITHOUT SCIATICA: Primary | ICD-10-CM

## 2018-09-07 DIAGNOSIS — M70.61 TROCHANTERIC BURSITIS OF RIGHT HIP: ICD-10-CM

## 2018-09-07 DIAGNOSIS — M54.50 CHRONIC RIGHT-SIDED LOW BACK PAIN WITHOUT SCIATICA: Primary | ICD-10-CM

## 2018-09-07 DIAGNOSIS — M16.11 PRIMARY OSTEOARTHRITIS OF RIGHT HIP: ICD-10-CM

## 2018-09-07 PROCEDURE — 97140 MANUAL THERAPY 1/> REGIONS: CPT

## 2018-09-07 PROCEDURE — 97110 THERAPEUTIC EXERCISES: CPT

## 2018-09-07 PROCEDURE — 97112 NEUROMUSCULAR REEDUCATION: CPT

## 2018-09-07 NOTE — PROGRESS NOTES
Daily Note     Today's date: 2018  Patient name: Diana Hinojosa  : 1945  MRN: [id number]  Referring provider: Alicia Tipton  Dx:   Encounter Diagnosis     ICD-10-CM    1  Chronic right-sided low back pain without sciatica M54 5     G89 29    2  Trochanteric bursitis of right hip M70 61    3  Primary osteoarthritis of right hip M16 11                 Session: 10  POC expires: 18  Last Re-Eval: 18    Subjective: Pt reports she has been feeling much less leg spasms since last session    Objective: See treatment diary below  There  Ex: 15 min  Bridges 20x  Clam shells blue t-band 20x  LE lowering 2 x 10  Rows with blue tubing 2 x 10    Manual 10 min  Lumbar flex/rot mob gr 3  Lumbar rot mob gr 4  Bilateral LE stretching    Neuro re -ed: 15 min  Sit - stand 20x  Step up with SLS 2 x 10 each LE  Side stepping in parallel bars    Assessment: Pt able to perform there ex without pain or  muscle cramping Patient would benefit from continued PT  Plan: Continue per plan of care

## 2018-09-13 ENCOUNTER — OFFICE VISIT (OUTPATIENT)
Dept: PHYSICAL THERAPY | Facility: CLINIC | Age: 73
End: 2018-09-13
Payer: MEDICARE

## 2018-09-13 DIAGNOSIS — M70.61 TROCHANTERIC BURSITIS OF RIGHT HIP: ICD-10-CM

## 2018-09-13 DIAGNOSIS — M16.11 PRIMARY OSTEOARTHRITIS OF RIGHT HIP: ICD-10-CM

## 2018-09-13 DIAGNOSIS — G89.29 CHRONIC RIGHT-SIDED LOW BACK PAIN WITHOUT SCIATICA: Primary | ICD-10-CM

## 2018-09-13 DIAGNOSIS — M54.50 CHRONIC RIGHT-SIDED LOW BACK PAIN WITHOUT SCIATICA: Primary | ICD-10-CM

## 2018-09-13 PROCEDURE — 97140 MANUAL THERAPY 1/> REGIONS: CPT

## 2018-09-13 PROCEDURE — 97112 NEUROMUSCULAR REEDUCATION: CPT

## 2018-09-13 PROCEDURE — 97110 THERAPEUTIC EXERCISES: CPT

## 2018-09-13 NOTE — PROGRESS NOTES
Daily Note     Today's date: 2018  Patient name: Ella Chaudhary  : 1945  MRN: [id number]  Referring provider: Karlie Conway  Dx:   Encounter Diagnosis     ICD-10-CM    1  Chronic right-sided low back pain without sciatica M54 5     G89 29    2  Trochanteric bursitis of right hip M70 61    3  Primary osteoarthritis of right hip M16 11                 Session: 11  POC expires: 18  Last Re-Eval: 18    Subjective: Pt reports she has not been feeling well, possible stomach virus, has experienced more cramping  Back is still sore = 5/10    Objective: See treatment diary below  There  Ex: 15 min  Bridges 20x  Clam shells blue t-band 20x  LE lowering 2 x 10  Rows with blue tubing 2 x 10  Peanut roll ins 20x  Nu Step 6 min    Manual 10 min  Lumbar flex/rot mob gr 3  Lumbar rot mob gr 4  Bilateral LE stretching    Neuro re -ed: 15 min  Sit - stand 20x  Step up with SLS 2 x 10 each LE  Side stepping in parallel bars    Assessment: Pt reports decreased back pain = 2/10  Patient would benefit from continued PT  Plan: Continue per plan of care

## 2018-09-17 ENCOUNTER — OFFICE VISIT (OUTPATIENT)
Dept: PHYSICAL THERAPY | Facility: CLINIC | Age: 73
End: 2018-09-17
Payer: MEDICARE

## 2018-09-17 DIAGNOSIS — G89.29 CHRONIC RIGHT-SIDED LOW BACK PAIN WITHOUT SCIATICA: Primary | ICD-10-CM

## 2018-09-17 DIAGNOSIS — M70.61 TROCHANTERIC BURSITIS OF RIGHT HIP: ICD-10-CM

## 2018-09-17 DIAGNOSIS — M54.50 CHRONIC RIGHT-SIDED LOW BACK PAIN WITHOUT SCIATICA: Primary | ICD-10-CM

## 2018-09-17 DIAGNOSIS — M16.11 PRIMARY OSTEOARTHRITIS OF RIGHT HIP: ICD-10-CM

## 2018-09-17 PROCEDURE — 97110 THERAPEUTIC EXERCISES: CPT

## 2018-09-17 PROCEDURE — 97140 MANUAL THERAPY 1/> REGIONS: CPT

## 2018-09-17 PROCEDURE — 97112 NEUROMUSCULAR REEDUCATION: CPT

## 2018-09-17 NOTE — PROGRESS NOTES
Daily Note     Today's date: 2018  Patient name: Daniele Treviño  : 1945  MRN: [id number]  Referring provider: Boone Padilla  Dx:   Encounter Diagnosis     ICD-10-CM    1  Chronic right-sided low back pain without sciatica M54 5     G89 29    2  Trochanteric bursitis of right hip M70 61    3  Primary osteoarthritis of right hip M16 11                 Session: 12  POC expires: 18  Last Re-Eval: 18    Subjective: Pt reports back pain = 5/10; muscle cramping has felt significantly less     Objective: See treatment diary below  There  Ex: 20 min  Bridges 20x  Clam shells blue t-band 20x  LE lowering 2 x 10  Rows with blue tubing 2 x 10  Peanut roll ins 20x  Nu Step 6 min    Manual 10 min  Lumbar flex/rot mob gr 3  Bilateral LE stretching    Neuro re -ed: 15 min  Sit - stand 20x  Step up with SLS 2 x 10 each LE    Assessment: Pt reports no back pain at end of session = 0/10  Patient would benefit from continued PT  Plan: Continue per plan of care

## 2018-09-20 ENCOUNTER — OFFICE VISIT (OUTPATIENT)
Dept: PHYSICAL THERAPY | Facility: CLINIC | Age: 73
End: 2018-09-20
Payer: MEDICARE

## 2018-09-20 DIAGNOSIS — M54.50 CHRONIC RIGHT-SIDED LOW BACK PAIN WITHOUT SCIATICA: Primary | ICD-10-CM

## 2018-09-20 DIAGNOSIS — M16.11 PRIMARY OSTEOARTHRITIS OF RIGHT HIP: ICD-10-CM

## 2018-09-20 DIAGNOSIS — G89.29 CHRONIC RIGHT-SIDED LOW BACK PAIN WITHOUT SCIATICA: Primary | ICD-10-CM

## 2018-09-20 DIAGNOSIS — M70.61 TROCHANTERIC BURSITIS OF RIGHT HIP: ICD-10-CM

## 2018-09-20 PROCEDURE — 97140 MANUAL THERAPY 1/> REGIONS: CPT

## 2018-09-20 PROCEDURE — 97110 THERAPEUTIC EXERCISES: CPT

## 2018-09-20 PROCEDURE — 97112 NEUROMUSCULAR REEDUCATION: CPT

## 2018-09-20 NOTE — PROGRESS NOTES
Daily Note     Today's date: 2018  Patient name: Nicole Parish  : 1945  MRN: [id number]  Referring provider: Prateek Orozco  Dx:   Encounter Diagnosis     ICD-10-CM    1  Chronic right-sided low back pain without sciatica M54 5     G89 29    2  Trochanteric bursitis of right hip M70 61    3  Primary osteoarthritis of right hip M16 11                 Session: 13  POC expires: 18  Last Re-Eval: 18    Subjective: Pt reports back pain = 5/10 and in right hip     Objective: See treatment diary below  There  Ex: 15 min  Bridges 20x  Clam shells blue t-band 20x  LE lowering 2 x 10  Rows with 10 lbs  2 x 10  Lats with 10 lbs 2 x 10  Peanut roll ins 20x  Nu Step 6 min    Manual 10 min  Lumbar flex/rot mob gr 3  Bilateral LE stretching    Neuro re -ed: 15 min  Sit - stand 20x  Step up with SLS 2 x 10 each LE  Side stepping 2 x 10 with red band    Assessment: Pt reports no back pain at end of session = 0/10  Patient would benefit from continued PT  Plan: Continue per plan of care

## 2018-09-26 ENCOUNTER — OFFICE VISIT (OUTPATIENT)
Dept: PHYSICAL THERAPY | Facility: CLINIC | Age: 73
End: 2018-09-26
Payer: MEDICARE

## 2018-09-26 DIAGNOSIS — M16.11 PRIMARY OSTEOARTHRITIS OF RIGHT HIP: ICD-10-CM

## 2018-09-26 DIAGNOSIS — M70.61 TROCHANTERIC BURSITIS OF RIGHT HIP: ICD-10-CM

## 2018-09-26 DIAGNOSIS — G89.29 CHRONIC RIGHT-SIDED LOW BACK PAIN WITHOUT SCIATICA: Primary | ICD-10-CM

## 2018-09-26 DIAGNOSIS — M54.50 CHRONIC RIGHT-SIDED LOW BACK PAIN WITHOUT SCIATICA: Primary | ICD-10-CM

## 2018-09-26 PROCEDURE — 97110 THERAPEUTIC EXERCISES: CPT

## 2018-09-26 PROCEDURE — 97112 NEUROMUSCULAR REEDUCATION: CPT

## 2018-09-26 NOTE — PROGRESS NOTES
Daily Note     Today's date: 2018  Patient name: Katarzyna Vallejo  : 1945  MRN: [id number]  Referring provider: Haylee Falk  Dx:   Encounter Diagnosis     ICD-10-CM    1  Chronic right-sided low back pain without sciatica M54 5     G89 29    2  Trochanteric bursitis of right hip M70 61    3  Primary osteoarthritis of right hip M16 11                 Session: 13  POC expires: 18  Last Re-Eval: 18    Subjective: Pt reports no back pain today, just feeling tired    Objective: See treatment diary below  There  Ex: 15 min  Bridges 20x  Clam shells blue t-band 20x  Rows with 10 lbs  2 x 10  Lats with 10 lbs 2 x 10  Nu Step 6 min    Manual 5 min  Lumbar flex/rot mob gr 3    Neuro re -ed: 15 min  Sit - stand 20x  Step up with SLS 2 x 10 each LE  Side stepping 2 x 10 with red band    Assessment: Pt reports no back pain at end of session = 0/10  Patient would benefit from continued PT  Session modified as pt did not have shorts under dress    Plan: Continue per plan of care

## 2018-09-28 ENCOUNTER — OFFICE VISIT (OUTPATIENT)
Dept: PHYSICAL THERAPY | Facility: CLINIC | Age: 73
End: 2018-09-28
Payer: MEDICARE

## 2018-09-28 DIAGNOSIS — M54.50 CHRONIC RIGHT-SIDED LOW BACK PAIN WITHOUT SCIATICA: Primary | ICD-10-CM

## 2018-09-28 DIAGNOSIS — G89.29 CHRONIC RIGHT-SIDED LOW BACK PAIN WITHOUT SCIATICA: Primary | ICD-10-CM

## 2018-09-28 DIAGNOSIS — M16.11 PRIMARY OSTEOARTHRITIS OF RIGHT HIP: ICD-10-CM

## 2018-09-28 DIAGNOSIS — M70.61 TROCHANTERIC BURSITIS OF RIGHT HIP: ICD-10-CM

## 2018-09-28 PROCEDURE — 97140 MANUAL THERAPY 1/> REGIONS: CPT

## 2018-09-28 PROCEDURE — 97110 THERAPEUTIC EXERCISES: CPT

## 2018-09-28 PROCEDURE — 97112 NEUROMUSCULAR REEDUCATION: CPT

## 2018-09-28 NOTE — PROGRESS NOTES
Daily Note     Today's date: 2018  Patient name: Zay Becker  : 1945  MRN: [id number]  Referring provider: Akua Elizabeth  Dx:   Encounter Diagnosis     ICD-10-CM    1  Chronic right-sided low back pain without sciatica M54 5     G89 29    2  Trochanteric bursitis of right hip M70 61    3  Primary osteoarthritis of right hip M16 11                 Session: 13  POC expires: 18  Last Re-Eval: 18    Subjective: Pt reports neck and right shoulder are very sore today    Objective: See treatment diary below  There  Ex: 15 min  Bridges 20x  Clam shells blue t-band 20x  Rows with 10 lbs  2 x 10  Lats with 10 lbs 2 x 10  Nu Step 6 min    Manual 10 min  Lumbar flex/rot mob gr 3  LE stretching  T/S Gr 3-4 mobs  Cervical traction    Neuro re -ed: 15 min  Sit - stand 20x  Step up with SLS 2 x 10 each LE  Side stepping 2 x 10 with red band    Assessment: Pt reports no back pain at end of session = 0/10  Patient would benefit from continued PT  Session modified as pt did not have shorts under dress    Plan: Continue per plan of care

## 2018-10-01 ENCOUNTER — OFFICE VISIT (OUTPATIENT)
Dept: PHYSICAL THERAPY | Facility: CLINIC | Age: 73
End: 2018-10-01
Payer: MEDICARE

## 2018-10-01 DIAGNOSIS — M70.61 TROCHANTERIC BURSITIS OF RIGHT HIP: ICD-10-CM

## 2018-10-01 DIAGNOSIS — M54.50 CHRONIC RIGHT-SIDED LOW BACK PAIN WITHOUT SCIATICA: Primary | ICD-10-CM

## 2018-10-01 DIAGNOSIS — M16.11 PRIMARY OSTEOARTHRITIS OF RIGHT HIP: ICD-10-CM

## 2018-10-01 DIAGNOSIS — G89.29 CHRONIC RIGHT-SIDED LOW BACK PAIN WITHOUT SCIATICA: Primary | ICD-10-CM

## 2018-10-01 PROCEDURE — 97112 NEUROMUSCULAR REEDUCATION: CPT

## 2018-10-01 PROCEDURE — 97140 MANUAL THERAPY 1/> REGIONS: CPT

## 2018-10-01 PROCEDURE — 97110 THERAPEUTIC EXERCISES: CPT

## 2018-10-01 NOTE — PROGRESS NOTES
Daily Note     Today's date: 10/1/2018  Patient name: Ej Barlow  : 1945  MRN: [id number]  Referring provider: Vikram Tobar  Dx:   Encounter Diagnosis     ICD-10-CM    1  Chronic right-sided low back pain without sciatica M54 5     G89 29    2  Trochanteric bursitis of right hip M70 61    3  Primary osteoarthritis of right hip M16 11                 Session: 14  POC expires: 18  Last Re-Eval: 18    Subjective: Pt reports neck and both shoulders were sore over weekend  Pt also reports right hip feels like its "out"    Objective: See treatment diary below  There  Ex: 10 min  Bridges 20x  Clam shells blue t-band 20x  Le lowering    Manual 15 min  Lumbar flex/rot mob gr 3  LE stretching  SIJ Gr 3-4 mobs left posterior correction  Lumbar gr 3-4 mobs: rotation and gapping    Neuro re -ed: 15 min  Sit - stand 20x  Step up with SLS 2 x 10 each LE  Side stepping 2 x 10 with red band    Assessment: Pt reports no back pain at end of session = 0/10  Patient would benefit from continued PT  Re-eval next session  Plan: Continue per plan of care

## 2018-10-08 ENCOUNTER — OFFICE VISIT (OUTPATIENT)
Dept: PHYSICAL THERAPY | Facility: CLINIC | Age: 73
End: 2018-10-08
Payer: MEDICARE

## 2018-10-08 DIAGNOSIS — M54.50 CHRONIC RIGHT-SIDED LOW BACK PAIN WITHOUT SCIATICA: ICD-10-CM

## 2018-10-08 DIAGNOSIS — M16.11 PRIMARY OSTEOARTHRITIS OF RIGHT HIP: ICD-10-CM

## 2018-10-08 DIAGNOSIS — M70.61 TROCHANTERIC BURSITIS OF RIGHT HIP: Primary | ICD-10-CM

## 2018-10-08 DIAGNOSIS — G89.29 CHRONIC RIGHT-SIDED LOW BACK PAIN WITHOUT SCIATICA: ICD-10-CM

## 2018-10-08 PROCEDURE — 97112 NEUROMUSCULAR REEDUCATION: CPT

## 2018-10-08 PROCEDURE — G8980 MOBILITY D/C STATUS: HCPCS

## 2018-10-08 PROCEDURE — 97110 THERAPEUTIC EXERCISES: CPT

## 2018-10-08 PROCEDURE — G8979 MOBILITY GOAL STATUS: HCPCS

## 2018-10-08 NOTE — PROGRESS NOTES
PT Re-Evaluation     Today's date: 10/8/2018  Patient name: Kenton Presley  : 1945  MRN: [id number]  Referring provider: Aleksander Anand  Dx:   Encounter Diagnosis     ICD-10-CM    1  Trochanteric bursitis of right hip M70 61    2  Primary osteoarthritis of right hip M16 11    3  Chronic right-sided low back pain without sciatica M54 5     G89 29                   Assessment  Impairments: abnormal muscle firing, abnormal or restricted ROM, activity intolerance, impaired physical strength, lacks appropriate home exercise program and pain with function    Assessment details: Kenton Presley is a 67 y o  female who presents with pain, decreased strength and decreased ROM  Due to these impairments, patient has difficulty performing ADL's, engaging in social activities and lifting/carrying  Patient's clinical presentation is consistent with their referring diagnosis of Chronic right-sided low back pain without sciatica  (primary encounter diagnosis)    Patient has been educated in home exercise program  Pt would like to try managing symptoms on her own at this time  Understanding of Dx/Px/POC: fair   Prognosis: fair    Goals  Short Term Goals: Target Date 10/5/18  1  Initiate and advance HEP - Achieved  2  Pt to report 4/10 pain at most in low back - Achieved  3  Pt to sleep for 6 hours without being woken by low back pain - Achieved    Long Term Goals: Target Date 18 - Partially Achieved  1  Indep with HEP  2  Pt to reports 3/10 low back at worst  3  Pt to be able to change positions without increase in low back pain  4   Pt to resume vacuuming and sweeping with less than 3/10 low back pain      Plan  Patient would benefit from: skilled PT  Planned modality interventions: cryotherapy and thermotherapy: hydrocollator packs  Planned therapy interventions: joint mobilization, manual therapy, patient education, postural training, activity modification, abdominal trunk stabilization, body mechanics training, flexibility, functional ROM exercises, graded exercise, home exercise program, neuromuscular re-education, strengthening, stretching, therapeutic activities, therapeutic exercise, motor coordination training, muscle pump exercises, gait training, balance/weight bearing training and ADL training  Frequency: 2x week  Treatment plan discussed with: patient  Plan details: Pt to trial managing symptoms with HEP for next 2-3 weeks as she will be away        Subjective Evaluation    History of Present Illness  Date of onset: 2018  Mechanism of injury: Pt reports muscle spasms are inconsistent   Some times back and hip are sore and sometimes they are not  Recurrent probem    Quality of life: good    Pain  Current pain ratin  At best pain ratin  At worst pain ratin  Location: Right Low Back, hip, and thigh  Quality: dull ache, radiating and throbbing  Relieving factors: medications  Progression: no change    Social Support  Steps to enter house: yes  Stairs in house: yes   Lives in: apartment  Lives with: spouse    Employment status: not working  Patient Goals  Patient goals for therapy: decreased pain      Pt reports history off Anemia and Alzheimers    Objective  Lumbar ROM:  Flexion in Standing =  WFL  painful  Extension in standing = 25% loss    Repeated Motion Testing:  RFIS = No effect  GRISELDA = No effect  RFIL = No effect    Right Hip ROM:  Flexion = WNL  Abduction = WFL  Int Rotation = moderate limitation  Ext Rotation = WFL    TTP in Right buttock and in right Piriformis, decreased from last re-eval    Precautions: Dementia, Anemia    There ex: 15 min  Bridges 20x  Hip add with ball 10 x 2 sec  Clam shells 2 x 10 black  Bilateral LE stretching HS and piriformis    Manual: 5 min  Lumbar rotation mob gr 4-5    Neuro re-ed: 20 min  Sit to stand 2 x 10  Side stepping red t-band 2 x 10  Step ups 8 in with no UE assist 2 x 10   LE lowering 2 x 10

## 2018-11-08 ENCOUNTER — HOSPITAL ENCOUNTER (EMERGENCY)
Facility: HOSPITAL | Age: 73
Discharge: HOME | End: 2018-11-08
Attending: EMERGENCY MEDICINE | Admitting: EMERGENCY MEDICINE
Payer: MEDICARE

## 2018-11-08 ENCOUNTER — APPOINTMENT (EMERGENCY)
Dept: RADIOLOGY | Facility: HOSPITAL | Age: 73
End: 2018-11-08
Payer: MEDICARE

## 2018-11-08 VITALS
HEART RATE: 68 BPM | OXYGEN SATURATION: 96 % | RESPIRATION RATE: 18 BRPM | TEMPERATURE: 98.2 F | HEIGHT: 60 IN | BODY MASS INDEX: 28.47 KG/M2 | WEIGHT: 145 LBS | DIASTOLIC BLOOD PRESSURE: 77 MMHG | SYSTOLIC BLOOD PRESSURE: 174 MMHG

## 2018-11-08 DIAGNOSIS — M62.830 MUSCLE SPASM OF BACK: Primary | ICD-10-CM

## 2018-11-08 PROCEDURE — 72070 X-RAY EXAM THORAC SPINE 2VWS: CPT

## 2018-11-08 PROCEDURE — 71045 X-RAY EXAM CHEST 1 VIEW: CPT

## 2018-11-08 PROCEDURE — 99283 EMERGENCY DEPT VISIT LOW MDM: CPT

## 2018-11-08 RX ORDER — PANTOPRAZOLE SODIUM 20 MG/1
40 TABLET, DELAYED RELEASE ORAL DAILY
COMMUNITY
End: 2022-04-05 | Stop reason: HOSPADM

## 2018-11-08 RX ORDER — TRAMADOL HYDROCHLORIDE 50 MG/1
50 TABLET ORAL ONCE
Status: COMPLETED | OUTPATIENT
Start: 2018-11-08 | End: 2018-11-08

## 2018-11-08 RX ORDER — ACETAMINOPHEN 325 MG/1
650 TABLET ORAL ONCE
Status: COMPLETED | OUTPATIENT
Start: 2018-11-08 | End: 2018-11-08

## 2018-11-08 RX ORDER — LEVOTHYROXINE SODIUM 0.1 MG/1
100 TABLET ORAL DAILY
COMMUNITY
End: 2019-01-02 | Stop reason: CLARIF

## 2018-11-08 RX ORDER — TRAMADOL HYDROCHLORIDE 50 MG/1
50 TABLET ORAL EVERY 8 HOURS PRN
Qty: 6 TABLET | Refills: 0 | Status: SHIPPED | OUTPATIENT
Start: 2018-11-08 | End: 2018-11-10

## 2018-11-08 RX ORDER — SUCRALFATE 1 G/1
1 TABLET ORAL 4 TIMES DAILY
COMMUNITY
End: 2019-01-03

## 2018-11-08 RX ORDER — TRAMADOL HYDROCHLORIDE 50 MG/1
50 TABLET ORAL EVERY 8 HOURS PRN
Qty: 6 TABLET | Refills: 0 | OUTPATIENT
Start: 2018-11-08 | End: 2018-11-08

## 2018-11-08 RX ORDER — DOCUSATE SODIUM 100 MG/1
100 CAPSULE, LIQUID FILLED ORAL DAILY
COMMUNITY
End: 2022-05-02

## 2018-11-08 RX ORDER — LIDOCAINE 50 MG/G
1 PATCH TOPICAL ONCE
Status: DISCONTINUED | OUTPATIENT
Start: 2018-11-08 | End: 2018-11-08 | Stop reason: HOSPADM

## 2018-11-08 RX ORDER — LEVOTHYROXINE SODIUM 137 UG/1
137 TABLET ORAL DAILY
COMMUNITY
End: 2018-11-08 | Stop reason: DRUGHIGH

## 2018-11-08 RX ORDER — LIDOCAINE 50 MG/G
1 PATCH TOPICAL DAILY
Qty: 10 PATCH | Refills: 0 | Status: SHIPPED | OUTPATIENT
Start: 2018-11-08 | End: 2019-01-03

## 2018-11-08 RX ORDER — LIDOCAINE 50 MG/G
1 PATCH TOPICAL DAILY
Qty: 10 PATCH | Refills: 0 | OUTPATIENT
Start: 2018-11-08 | End: 2018-11-08

## 2018-11-08 RX ADMIN — TRAMADOL HYDROCHLORIDE 50 MG: 50 TABLET, COATED ORAL at 13:56

## 2018-11-08 RX ADMIN — ACETAMINOPHEN 650 MG: 325 TABLET, FILM COATED ORAL at 12:27

## 2018-11-08 RX ADMIN — LIDOCAINE 1 PATCH: 50 PATCH TOPICAL at 12:28

## 2018-11-08 NOTE — DISCHARGE INSTRUCTIONS
Muscle Spasm   WHAT YOU NEED TO KNOW:   A muscle spasm is a sudden contraction of any muscle or group of muscles  A muscle cramp is a painful muscle spasm  Muscle cramps commonly occur after intense exercise or during pregnancy  They may also be caused by certain medications, dehydration, low calcium or magnesium levels, or another medical condition  DISCHARGE INSTRUCTIONS:   Medicines: You may need the following:  · NSAIDs  help decrease swelling and pain or fever  This medicine is available with or without a doctor's order  NSAIDs can cause stomach bleeding or kidney problems in certain people  If you take blood thinner medicine, always ask your healthcare provider if NSAIDs are safe for you  Always read the medicine label and follow directions  · Take your medicine as directed  Contact your healthcare provider if you think your medicine is not helping or if you have side effects  Tell him of her if you are allergic to any medicine  Keep a list of the medicines, vitamins, and herbs you take  Include the amounts, and when and why you take them  Bring the list or the pill bottles to follow-up visits  Carry your medicine list with you in case of an emergency  Follow up with your healthcare provider as directed: You may need other tests or treatment  You may also be referred to a physical therapist or other specialist  Write down your questions so you remember to ask them during your visits  Self-care:   · Stretch  your muscle to help relieve the cramp  It may be helpful to keep your muscle in the stretched position until the cramp is gone  · Apply heat  to help decrease pain and muscle spasms  Apply heat on the area for 20 to 30 minutes every 2 hours for as many days as directed  · Apply ice  to help decrease swelling and pain  Ice may also help prevent tissue damage  Use an ice pack, or put crushed ice in a plastic bag   Cover it with a towel and place it on your muscle for 15 to 20 minutes every hour or as directed  · Drink more liquids  to help prevent muscle cramps caused by dehydration  Sports drinks may help replace electrolytes you lose through sweat during exercise  Ask your healthcare provider how much liquid to drink each day and which liquids are best for you  · Eat healthy foods , such as fruits, vegetables, whole grains, low-fat dairy products, and lean proteins (meat, beans, and fish)  If you are pregnant, ask your healthcare provider about foods that are high in magnesium and sodium  They may help to relieve cramps during pregnancy  · Massage your muscle  to help relieve the cramp  · Take frequent deep breaths  until the cramp feels better  Lie down while you take the deep breaths so you do not get dizzy or lightheaded  Contact your healthcare provider if:   · You have signs of dehydration, such as a headache, dark yellow urine, dry eyes or mouth, or a fast heartbeat  · You have questions or concerns about your condition or care  Return to the emergency department if:   · You have warmth, swelling, or redness in the cramping muscle  · You have frequent or unrelieved muscle cramps in several different muscles  · You have muscle cramps with numbness, tingling, and burning in your hands and feet  © 2017 [address] Information is for End User's use only and may not be sold, redistributed or otherwise used for commercial purposes  All illustrations and images included in CareNotes® are the copyrighted property of [employer]  or Wil Burch  The above information is an  only  It is not intended as medical advice for individual conditions or treatments  Talk to your doctor, nurse or pharmacist before following any medical regimen to see if it is safe and effective for you

## 2018-11-08 NOTE — ED PROVIDER NOTES
History  Chief Complaint   Patient presents with    Back Pain     Pt c/o mid back pain x 1 week  Hurts to move or take deep breath  Did not call PMD or take anything for pain  79-year-old female presenting today with mid back pain over the past 10 days ranking 6/10 nonradiating  States that it hurts to move or lift any heavy objects  Has been ambulating without difficulty  No injuries  States that she has a history of back issues as well as muscle spasming  Pain will come and go and sharp  Has been taking Tylenol with minimal relief  Denies chest pain, numbness, paresthesias, radiating pain, abdominal pain, weakness, fevers, cough congestion, shortness of breath  Prior to Admission Medications   Prescriptions Last Dose Informant Patient Reported? Taking? albuterol (ACCUNEB) 0 63 MG/3ML nebulizer solution   Yes No   Sig: Take 1 ampule by nebulization every 6 (six) hours as needed for wheezing   albuterol (PROVENTIL HFA,VENTOLIN HFA) 90 mcg/act inhaler   Yes No   Sig: Inhale 2 puffs every 6 (six) hours as needed for wheezing     aspirin 81 mg chewable tablet   Yes No   Sig: Chew 81 mg every morning     docusate sodium (COLACE) 100 mg capsule   Yes Yes   Sig: Take 100 mg by mouth daily   donepezil (ARICEPT) 5 mg tablet   Yes No   Sig: Take 5 mg by mouth daily   famotidine (PEPCID) 20 mg tablet   Yes No   Sig: Take 20 mg by mouth 2 (two) times a day   iron polysaccharides (FERREX 150) 150 mg capsule   Yes No   Sig: Take 150 mg by mouth daily     levothyroxine 100 mcg tablet   Yes Yes   Sig: Take 100 mcg by mouth daily   melatonin 3 mg   Yes No   Sig: Take 3 mg by mouth daily at bedtime as needed   methocarbamol (ROBAXIN) 500 mg tablet   No No   Sig: Take 1 tablet (500 mg total) by mouth 2 (two) times a day   multivitamin (THERAGRAN) TABS   Yes No   Sig: Take 1 tablet by mouth every morning   pantoprazole (PROTONIX) 40 mg tablet   Yes Yes   Sig: Take 40 mg by mouth daily   sucralfate (CARAFATE) 1 g tablet   Yes Yes   Sig: Take 1 g by mouth 4 (four) times a day      Facility-Administered Medications: None       Past Medical History:   Diagnosis Date    Allergic rhinitis     Last Assessed:10/22/2014    Alzheimer disease     Angina pectoris ([address] )     Ankle fracture, right     casted    Anxiety     Arthritis     Asthma     Cisneros esophagus     Bursitis     Constipation     Cystitis     chronic    Diverticulosis     Fibromyalgia     Fibromyalgia, primary     GERD (gastroesophageal reflux disease)     H/o Lyme disease     Hiatal hernia     History of Clostridium difficile infection     Hyperlipidemia     Hypothyroid     hypo    IBS (irritable bowel syndrome)     Labral tear of shoulder     left    Lichen sclerosus of female genitalia 2016    Murmur     Neck pain, chronic     gets steroid injections-none since late 2016    Neuralgia     Oral lichen planus     Peritonitis (HCC)     Spinal stenosis     Ulcer     Ulcer of gastric fundus     Wears glasses     Wears partial dentures     upper and lower       Past Surgical History:   Procedure Laterality Date    ABDOMINAL SURGERY      exploratory-removal of appendix    APPENDECTOMY      CARDIAC CATHETERIZATION      CARPAL TUNNEL RELEASE Bilateral     CHOLECYSTECTOMY      COLONOSCOPY      sigmoid diverticulosis,5mm rectal tubular adenoma5/06    DILATION AND CURETTAGE OF UTERUS      x3 miscarriages    FOOT SURGERY Right     5th toe-hammertoe repair    HYSTERECTOMY  01/09/2012    complete ; for bleeding,tubal ligation,vaginal prolapse and rectocele repair    OVARIAN CYST REMOVAL      [place] COLONOSCOPY FLX DX W/COLLJ SPEC WHEN PFRMD N/A 10/23/2017    Procedure: EGD AND COLONOSCOPY;  Surgeon: Angie Piña MD;  [facility];   Service: Gastroenterology    [place] DAR Wheeler Left 11/13/2017    Procedure: SHOULDER ARTHROSCOPY WITH SUBACROMIAL DECOMPRESSION, ROTATOR CUFF REPAIR;  Surgeon: Chelsea Saleh MD;  Location: [facility] MAIN OR;  Service: Orthopedics    ROTATOR CUFF REPAIR Right     TONSILLECTOMY         Family History   Problem Relation Age of Onset    Cancer Mother         colon    Ulcerative colitis Mother     Alzheimer's disease Father     Heart disease Sister         MI x4-angioplasty x4 stents    Colonic polyp Sister     Ulcerative colitis Sister    Christian Golden Cancer Brother         brain tumor-age 6    Cancer Brother 72        prostate    Arthritis Family     Osteoarthritis Family     Crohn's disease Neg Hx     Liver cancer Neg Hx      I have reviewed and agree with the history as documented  Social History   Substance Use Topics    Smoking status: Never Smoker    Smokeless tobacco: Never Used    Alcohol use No      Comment: stopped drinking 9 years ago        Review of Systems   Constitutional: Negative  Negative for chills, fever and unexpected weight change  Denies IV drug use     HENT: Negative  Eyes: Negative  Respiratory: Negative  Negative for cough, chest tightness, shortness of breath and wheezing  Cardiovascular: Negative  Negative for chest pain and palpitations  Gastrointestinal: Negative  Negative for abdominal pain, constipation, diarrhea, nausea and vomiting  Genitourinary: Negative  Negative for difficulty urinating, dysuria, flank pain, frequency, hematuria and urgency  Denies numbness, tingling in the groin  Musculoskeletal: Positive for back pain  Negative for arthralgias, gait problem, joint swelling, myalgias, neck pain and neck stiffness  Skin: Negative  Negative for color change  Neurological: Negative  Negative for dizziness, tremors, weakness, light-headedness, numbness and headaches  All other systems reviewed and are negative  Physical Exam  Physical Exam   Constitutional: She is oriented to person, place, and time  She appears well-developed and well-nourished     HENT:   Head: Normocephalic and atraumatic  Right Ear: External ear normal    Left Ear: External ear normal    Nose: Nose normal    Mouth/Throat: Oropharynx is clear and moist    Eyes: Pupils are equal, round, and reactive to light  Conjunctivae and EOM are normal    Neck: Normal range of motion  Neck supple  Cardiovascular: Normal rate, regular rhythm, normal heart sounds and intact distal pulses  Exam reveals no gallop and no friction rub  No murmur heard  Pulmonary/Chest: Effort normal and breath sounds normal  No respiratory distress  She has no wheezes  She has no rales  She exhibits no tenderness  spo2 is 96% indicating adequate oxygenation  Abdominal: Soft  Bowel sounds are normal  She exhibits no distension and no mass  There is no tenderness  There is no rebound and no guarding  No hernia  Musculoskeletal:        Arms:  Neurological: She is alert and oriented to person, place, and time  Skin: Skin is warm and dry  Capillary refill takes less than 2 seconds  Nursing note and vitals reviewed        Vital Signs  ED Triage Vitals   Temperature Pulse Respirations Blood Pressure SpO2   11/08/18 1148 11/08/18 1148 11/08/18 1148 11/08/18 1148 11/08/18 1148   99 2 °F (37 3 °C) 90 16 142/67 96 %      Temp Source Heart Rate Source Patient Position - Orthostatic VS BP Location FiO2 (%)   11/08/18 1407 11/08/18 1407 11/08/18 1407 11/08/18 1407 --   Oral Monitor Lying Right arm       Pain Score       11/08/18 1148       3           Vitals:    11/08/18 1148 11/08/18 1407   BP: 142/67 (!) 174/77   Pulse: 90 68   Patient Position - Orthostatic VS:  Lying       Visual Acuity      ED Medications  Medications   lidocaine (LIDODERM) 5 % patch 1 patch (1 patch Topical Medication Applied 11/8/18 1228)   acetaminophen (TYLENOL) tablet 650 mg (650 mg Oral Given 11/8/18 1227)   traMADol (ULTRAM) tablet 50 mg (50 mg Oral Given 11/8/18 1356)       Diagnostic Studies  Results Reviewed     None                 XR thoracic spine 2 views   Final Result by Linda Hernandez MD (11/08 1328)      No acute osseous abnormality  Degenerative changes  Workstation performed: [id number]         XR chest 1 view   Final Result by Linda Hernandez MD (11/08 1327)      No acute cardiopulmonary disease  Workstation performed: [id number]                    Procedures  Procedures       Phone Contacts  ED Phone Contact    ED Course                               MDM  Number of Diagnoses or Management Options  Muscle spasm of back:   Diagnosis management comments: Patient feeling much better after pain medication, she tolerates tramadol well and has been on this before in the past   Ambulating in the ED without difficulty  No neurological changes or chest pain  Patient was given strict return precautions for any worsening symptoms otherwise may follow up with primary care doctor for re-evaluation  She informed not to drive or operate heavy machinery while taking very short course of tramadol which she is encouraged to take only at night and was given education regarding side effects  Patient verbalizes understanding and agrees with the above assessment plan  Amount and/or Complexity of Data Reviewed  Tests in the radiology section of CPT®: reviewed and ordered  Review and summarize past medical records: yes  Independent visualization of images, tracings, or specimens: yes      CritCare Time    Disposition  Final diagnoses:   Muscle spasm of back     Time reflects when diagnosis was documented in both MDM as applicable and the Disposition within this note     Time User Action Codes Description Comment    11/8/2018  1:59 PM Naseem Bravo Add [K43 [id number]] Muscle spasm of back       ED Disposition     ED Disposition Condition Comment    [facility]      Condition at discharge: Good        Follow-up Information     Follow up With Specialties Details Why Contact Info Additional Information    St  [address] Emergency Department Emergency Medicine Go to If symptoms worsen [address] [facility] ED, Alize Lamb [place], [address] [place] [address], MD Family Medicine Schedule an appointment as soon as possible for a visit As needed [id number] [facility] [address]  [phone]             Discharge Medication List as of 11/8/2018  2:00 PM      START taking these medications    Details   lidocaine (LIDODERM) 5 % Apply 1 patch topically daily for 10 days Remove & Discard patch within 12 hours or as directed by MD, Starting Thu 11/8/2018, Until Sun 11/18/2018, Print      traMADol (ULTRAM) 50 mg tablet Take 1 tablet (50 mg total) by mouth every 8 (eight) hours as needed for severe pain for up to 2 days, Starting Thu 11/8/2018, Until Sat 11/10/2018, Print         CONTINUE these medications which have NOT CHANGED    Details   docusate sodium (COLACE) 100 mg capsule Take 100 mg by mouth daily, Historical Med      levothyroxine 100 mcg tablet Take 100 mcg by mouth daily, Historical Med      pantoprazole (PROTONIX) 40 mg tablet Take 40 mg by mouth daily, Historical Med      sucralfate (CARAFATE) 1 g tablet Take 1 g by mouth 4 (four) times a day, Historical Med      albuterol (ACCUNEB) 0 63 MG/3ML nebulizer solution Take 1 ampule by nebulization every 6 (six) hours as needed for wheezing, Historical Med      albuterol (PROVENTIL HFA,VENTOLIN HFA) 90 mcg/act inhaler Inhale 2 puffs every 6 (six) hours as needed for wheezing , Until Discontinued, Historical Med      aspirin 81 mg chewable tablet Chew 81 mg every morning  , Historical Med      donepezil (ARICEPT) 5 mg tablet Take 5 mg by mouth daily, Starting Mon 3/5/2018, Historical Med      famotidine (PEPCID) 20 mg tablet Take 20 mg by mouth 2 (two) times a day, Historical Med      iron polysaccharides (FERREX 150) 150 mg capsule Take 150 mg by mouth daily  , Historical Med melatonin 3 mg Take 3 mg by mouth daily at bedtime as needed, Historical Med      methocarbamol (ROBAXIN) 500 mg tablet Take 1 tablet (500 mg total) by mouth 2 (two) times a day, Starting Fri 7/6/2018, Normal      multivitamin (THERAGRAN) TABS Take 1 tablet by mouth every morning, Historical Med           No discharge procedures on file      ED Provider  Electronically Signed by           Neo Hearn PA-C  11/08/18 2617

## 2018-12-30 PROBLEM — I25.10 ARTERIOSCLEROTIC CORONARY ARTERY DISEASE: Status: ACTIVE | Noted: 2017-04-07

## 2019-01-02 ENCOUNTER — OFFICE VISIT (OUTPATIENT)
Dept: OBGYN CLINIC | Facility: CLINIC | Age: 74
End: 2019-01-02
Payer: MEDICARE

## 2019-01-02 VITALS
WEIGHT: 137.2 LBS | HEART RATE: 78 BPM | BODY MASS INDEX: 26.93 KG/M2 | HEIGHT: 60 IN | DIASTOLIC BLOOD PRESSURE: 72 MMHG | SYSTOLIC BLOOD PRESSURE: 133 MMHG

## 2019-01-02 DIAGNOSIS — M25.551 RIGHT HIP PAIN: Primary | ICD-10-CM

## 2019-01-02 DIAGNOSIS — M70.61 GREATER TROCHANTERIC BURSITIS OF RIGHT HIP: ICD-10-CM

## 2019-01-02 PROCEDURE — 99213 OFFICE O/P EST LOW 20 MIN: CPT | Performed by: ORTHOPAEDIC SURGERY

## 2019-01-02 RX ORDER — LEVOTHYROXINE SODIUM 88 UG/1
88 TABLET ORAL DAILY
Refills: 2 | COMMUNITY
Start: 2018-12-03 | End: 2019-01-03

## 2019-01-02 NOTE — PROGRESS NOTES
Assessment/Plan:  1  Right hip pain  Ambulatory referral to Physical Therapy   2  Greater trochanteric bursitis of right hip  Ambulatory referral to Physical Therapy     Patient is here for follow-up regarding her right hip pain and complaints  She admits that she has not been using the Voltaren cream or has gone to physical therapy as recommended on last evaluation  She states her pain has not been that bad and she felt like she did not need to go or use cream   Admittedly she states that she does have dementia and Alzheimer's  On examination today she is with an exam that is consistent with greater troch bursitis and I recommended continue use of the physical therapy and Voltaren cream   A new referral for PT was provided for her  She says that she has cream at home and I asked her to call me if she gets home and realizes that she does not have Voltaren cream available for her  I will see her back when her symptoms worsen and the conservative treatment above is failing  Subjective: Follow-up right hip    Patient ID: Jo Olea is a 75 y o  female  HPI  Patient is here for follow-up regarding her right hip pain  She states that her pain over the lateral aspect of her right hip has not been that severe  She was given a PT referral and Voltaren recommendations on last visit and states that she has not done either of these because her pain has been minimal   She states there is discomfort in the lateral aspect of her hip when she sits too long or attempts to lie on that side at night  She denies any paresthesias down the right lower extremity at this time  She denies groin pain      Review of Systems   Constitutional: Positive for activity change  HENT: Negative  Eyes: Negative  Respiratory: Negative  Cardiovascular: Negative  Gastrointestinal: Negative  Endocrine: Negative  Musculoskeletal: Positive for arthralgias  Skin: Negative  Neurological: Negative  Psychiatric/Behavioral: Negative  Past Medical History:   Diagnosis Date    Allergic rhinitis     Last Assessed:10/22/2014    Alzheimer disease     Angina pectoris (HCC)     Ankle fracture, right     casted    Anxiety     Arthritis     Asthma     Cisneros esophagus     Bursitis     Constipation     Cystitis     chronic    Diverticulosis     Fibromyalgia     Fibromyalgia, primary     GERD (gastroesophageal reflux disease)     H/o Lyme disease     Hiatal hernia     History of Clostridium difficile infection     Hyperlipidemia     Hypothyroid     hypo    IBS (irritable bowel syndrome)     Labral tear of shoulder     left    Lichen sclerosus of female genitalia 2016    Murmur     Neck pain, chronic     gets steroid injections-none since late 2016    Neuralgia     Oral lichen planus     Peritonitis (HCC)     Spinal stenosis     Ulcer     Ulcer of gastric fundus     Wears glasses     Wears partial dentures     upper and lower       Past Surgical History:   Procedure Laterality Date    ABDOMINAL SURGERY      exploratory-removal of appendix    APPENDECTOMY      CARDIAC CATHETERIZATION      CARPAL TUNNEL RELEASE Bilateral     CHOLECYSTECTOMY      COLONOSCOPY      sigmoid diverticulosis,5mm rectal tubular adenoma5/06    DILATION AND CURETTAGE OF UTERUS      x3 miscarriages    FOOT SURGERY Right     5th toe-hammertoe repair    FOOT SURGERY      HIATAL HERNIA REPAIR      HYSTERECTOMY  01/09/2012    complete ; for bleeding,tubal ligation,vaginal prolapse and rectocele repair    OVARIAN CYST REMOVAL      [place] COLONOSCOPY FLX DX W/COLLJ SPEC WHEN PFRMD N/A 10/23/2017    Procedure: EGD AND COLONOSCOPY;  Surgeon: Peggy Chaparro MD;  [facility];   Service: Gastroenterology    MD SHLDR Refugia Lingo Left 11/13/2017    Procedure: SHOULDER ARTHROSCOPY WITH SUBACROMIAL DECOMPRESSION, ROTATOR CUFF REPAIR;  Surgeon: Denia Browning MD;  Location: [address] OR;  Service: Orthopedics    ROTATOR CUFF REPAIR Right     TONSILLECTOMY         Family History   Problem Relation Age of Onset   [facility] Cancer Mother         colon    Ulcerative colitis Mother     Alzheimer's disease Father     Heart disease Sister         MI x4-angioplasty x4 stents    Colonic polyp Sister     Ulcerative colitis Sister     Cancer Brother         brain tumor-age 11    Cancer Brother 72        prostate    Arthritis Family     Osteoarthritis Family     Crohn's disease Neg Hx     Liver cancer Neg Hx        Social History     Occupational History    retired      Social History Main Topics    Smoking status: Never Smoker    Smokeless tobacco: Never Used    Alcohol use No      Comment: stopped drinking 9 years ago    Drug use: No    Sexual activity: Not on file         Current Outpatient Prescriptions:     albuterol (ACCUNEB) 0 63 MG/3ML nebulizer solution, Take 1 ampule by nebulization every 6 (six) hours as needed for wheezing, Disp: , Rfl:     albuterol (PROVENTIL HFA,VENTOLIN HFA) 90 mcg/act inhaler, Inhale 2 puffs every 6 (six) hours as needed for wheezing , Disp: , Rfl:     aspirin 81 mg chewable tablet, Chew 81 mg every morning  , Disp: , Rfl:     docusate sodium (COLACE) 100 mg capsule, Take 100 mg by mouth daily, Disp: , Rfl:     donepezil (ARICEPT) 5 mg tablet, Take 5 mg by mouth daily, Disp: , Rfl: 5    famotidine (PEPCID) 20 mg tablet, Take 20 mg by mouth 2 (two) times a day, Disp: , Rfl:     iron polysaccharides (FERREX 150) 150 mg capsule, Take 150 mg by mouth daily  , Disp: , Rfl:     levothyroxine 88 mcg tablet, Take 88 mcg by mouth daily, Disp: , Rfl: 2    melatonin 3 mg, Take 3 mg by mouth daily at bedtime as needed, Disp: , Rfl:     methocarbamol (ROBAXIN) 500 mg tablet, Take 1 tablet (500 mg total) by mouth 2 (two) times a day, Disp: 20 tablet, Rfl: 0    Metoclopramide HCl (REGLAN PO), Take by mouth, Disp: , Rfl:     multivitamin (THERAGRAN) TABS, Take 1 tablet by mouth every morning, Disp: , Rfl:     pantoprazole (PROTONIX) 40 mg tablet, Take 40 mg by mouth daily, Disp: , Rfl:     sucralfate (CARAFATE) 1 g tablet, Take 1 g by mouth 4 (four) times a day, Disp: , Rfl:     lidocaine (LIDODERM) 5 %, Apply 1 patch topically daily for 10 days Remove & Discard patch within 12 hours or as directed by MD, Disp: 10 patch, Rfl: 0    Allergies   Allergen Reactions    Omnipaque [Iohexol] Other (See Comments)     Shakes, "passed out"    Pneumovax 23 [Pneumococcal Vac Polyvalent] Hives    Iodine     Iv Dye  [Iodinated Diagnostic Agents] Confusion     Annotation - [id number]: shaking    Aspirin GI Intolerance and Nausea Only     Reaction Date: [id number]; Annotation - [id number]: 325mg causes bleeding  Cannot take a dose higher than 81mg-pt has a hx of ulcer    Codeine GI Intolerance     N/V    Flexeril [Cyclobenzaprine]      Unknown reaction per pt  Allergy was noted on physicians note    Latex Rash    Other Rash     Adhesive Tape-caused a rash       Objective:  Vitals:    01/02/19 0822   BP: 133/72   Pulse: 78       Body mass index is 26 8 kg/m²  Right Hip Exam     Tenderness   The patient is experiencing tenderness in the greater trochanter  Range of Motion   Extension: normal   Flexion: normal   Internal Rotation: normal   External Rotation: normal   Abduction: normal   Adduction: normal     Muscle Strength   Abduction: 5/5   Adduction: 5/5   Flexion: 5/5     Tests   LAZARUS: negative  Josh: negative    Other   Erythema: absent  Sensation: normal  Pulse: present    Comments:  Negative Stinchfield  No leg length discrepancy  Marked tenderness palpation over greater troch            Physical Exam   Constitutional: She is oriented to person, place, and time  She appears well-developed  HENT:   Head: Atraumatic  Eyes: EOM are normal    Neck: Neck supple  Cardiovascular: Normal rate      Pulmonary/Chest: Effort normal    Musculoskeletal:   See orthopedic exam Neurological: She is alert and oriented to person, place, and time  Skin: Skin is warm and dry  Psychiatric: She has a normal mood and affect  Nursing note and vitals reviewed  Manuel JOHNSON    Division of Adult Reconstruction  Department of [facility]

## 2019-01-03 ENCOUNTER — OFFICE VISIT (OUTPATIENT)
Dept: CARDIOLOGY CLINIC | Facility: CLINIC | Age: 74
End: 2019-01-03
Payer: MEDICARE

## 2019-01-03 VITALS
HEART RATE: 62 BPM | OXYGEN SATURATION: 97 % | WEIGHT: 138 LBS | BODY MASS INDEX: 27.09 KG/M2 | SYSTOLIC BLOOD PRESSURE: 124 MMHG | HEIGHT: 60 IN | DIASTOLIC BLOOD PRESSURE: 76 MMHG

## 2019-01-03 DIAGNOSIS — K44.9 HIATAL HERNIA: ICD-10-CM

## 2019-01-03 DIAGNOSIS — I10 BENIGN ESSENTIAL HYPERTENSION: ICD-10-CM

## 2019-01-03 DIAGNOSIS — R07.89 ATYPICAL CHEST PAIN: ICD-10-CM

## 2019-01-03 DIAGNOSIS — E78.2 MIXED HYPERLIPIDEMIA: ICD-10-CM

## 2019-01-03 DIAGNOSIS — F41.1 GENERALIZED ANXIETY DISORDER: ICD-10-CM

## 2019-01-03 DIAGNOSIS — I25.10 ARTERIOSCLEROTIC CORONARY ARTERY DISEASE: ICD-10-CM

## 2019-01-03 PROCEDURE — 93000 ELECTROCARDIOGRAM COMPLETE: CPT | Performed by: INTERNAL MEDICINE

## 2019-01-03 PROCEDURE — 99214 OFFICE O/P EST MOD 30 MIN: CPT | Performed by: INTERNAL MEDICINE

## 2019-01-03 RX ORDER — PAROXETINE 10 MG/1
10 TABLET, FILM COATED ORAL DAILY
COMMUNITY

## 2019-01-03 RX ORDER — LEVOTHYROXINE SODIUM 100 UG/1
100 CAPSULE ORAL DAILY
COMMUNITY
End: 2022-05-02

## 2019-01-03 RX ORDER — ATORVASTATIN CALCIUM 10 MG/1
10 TABLET, FILM COATED ORAL DAILY
COMMUNITY

## 2019-07-02 NOTE — PROGRESS NOTES
Progress Note - Cardiology Office  [facility]    Venus Rosario 75 y o  female MRN: [id number]  : 1945  Encounter: [id number]      Assessment:     1  Benign essential hypertension    2  Arteriosclerotic coronary artery disease    3  Mixed hyperlipidemia    4  Generalized anxiety disorder    5  Atypical chest pain    6  Hiatal hernia status post EsophyX TIF    7  Cervical radiculopathy        Discussion Summary and Plan:    1  Atypical reproducible chest pain most likely secondary to muscular pain  It sharp no relationship to activities  And is not accompanied with any symptoms  2  Coronary artery disease  Status post cardiac catheterization twice in  and  with mild nonobstructive coronary artery disease continue aspirin and statins  No significant symptoms    3  Dyslipidemia  She decrease her statin to low-dose due to muscle spasms  Currently she is on low-dose she is tolerating it very well    4  Hypothyroidism  On Synthroid  TSH is acceptable as per patient    5  History of DJD/back pain and fibromyalgia  Stable  Many of her medications were discontinued as she was getting memory loss  Currently she is on Aricept    6  Anxiety  Patient was reassured that her symptoms of less likely to be cardiac in origin  EKG shows no changes  7  Large hiatal hernia status post procedure by endoscopy  Follow up with GI  Please call [phone], if any questions  Counseling :  A description of the counseling  Goals and Barriers  Patient's ability to self care: Yes  Medication side effect reviewed with patient in detail and all their questions answered to their satisfaction  HPI :     Venus Rosario is a 75y o  year old female who came for follow up  Patient has with a past medical history significant for mild nonobstructive coronary artery disease cardiac catheterization, dyslipidemia, back pain, asthma who came to see us for routine follow-up   Patient complains of some atypical chest pain which had one episode a few weeks ago with no relation to exercise  Patient is pretty active and walks every day with no symptoms of any chest pain or any shortness of breath  She has no fever no chills no nausea no vomiting no other significant complaint       07/03/2019  Above reviewed  Patient came for follow-up  She is having short-term memory loss otherwise she is doing very well  She had hiatal hernia and it was fixed by endoscopically  No new complaint  She has a cardiac catheterization done few years ago with nonobstructive CAD and she is on cholesterol medication  She still gets episodes of chest pain like muscle spasm which last for few seconds  She also similarly get back spasm otherwise no other issues  She is pretty active and has no significant shortness of breath, nausea vomiting diarrhea or any other issues  Review of Systems   Constitutional: Negative for activity change, chills, diaphoresis, fever and unexpected weight change  HENT: Negative for congestion  Eyes: Negative for discharge and redness  Respiratory: Negative for cough, chest tightness, shortness of breath and wheezing  Cardiovascular: Negative  Negative for chest pain, palpitations and leg swelling  Gastrointestinal: Negative for abdominal pain, diarrhea and nausea  Endocrine: Negative  Genitourinary: Negative for decreased urine volume and urgency  Musculoskeletal: Negative  Negative for arthralgias, back pain and gait problem  Muscle spasm   Skin: Negative for rash and wound  Allergic/Immunologic: Negative  Neurological: Negative for dizziness, seizures, syncope, weakness, light-headedness and headaches  Hematological: Negative  Psychiatric/Behavioral: Negative for agitation and confusion  The patient is not nervous/anxious          Historical Information   Past Medical History:   Diagnosis Date    Allergic rhinitis     Last Assessed:10/22/2014    Alzheimer disease     Angina pectoris (HCC)     Ankle fracture, right     casted    Anxiety     Arthritis     Asthma     Cisneros esophagus     Bursitis     Constipation     Cystitis     chronic    Diverticulosis     Fibromyalgia     Fibromyalgia, primary     GERD (gastroesophageal reflux disease)     H/o Lyme disease     Hiatal hernia     History of Clostridium difficile infection     Hyperlipidemia     Hypothyroid     hypo    IBS (irritable bowel syndrome)     Labral tear of shoulder     left    Lichen sclerosus of female genitalia 2016    Murmur     Neck pain, chronic     gets steroid injections-none since late 2016    Neuralgia     Oral lichen planus     Peritonitis (HCC)     Spinal stenosis     Ulcer     Ulcer of gastric fundus     Wears glasses     Wears partial dentures     upper and lower     Past Surgical History:   Procedure Laterality Date    ABDOMINAL SURGERY      exploratory-removal of appendix    APPENDECTOMY      CARDIAC CATHETERIZATION      CARPAL TUNNEL RELEASE Bilateral     CHOLECYSTECTOMY      COLONOSCOPY      sigmoid diverticulosis,5mm rectal tubular adenoma5/06    DILATION AND CURETTAGE OF UTERUS      x3 miscarriages    FOOT SURGERY Right     5th toe-hammertoe repair    FOOT SURGERY      HIATAL HERNIA REPAIR      HYSTERECTOMY  01/09/2012    complete ; for bleeding,tubal ligation,vaginal prolapse and rectocele repair    OVARIAN CYST REMOVAL      NJ COLONOSCOPY FLX DX W/COLLJ SPEC WHEN PFRMD N/A 10/23/2017    Procedure: EGD AND COLONOSCOPY;  Surgeon: Lyndon Alvarado MD;  Location: [facility];   Service: Gastroenterology    NJ DAR Gonzalez Left 11/13/2017    Procedure: SHOULDER ARTHROSCOPY WITH SUBACROMIAL DECOMPRESSION, ROTATOR CUFF REPAIR;  Surgeon: Nicole Price MD;  Location: [facility] OR;  Service: Orthopedics    [address] Right     TONSILLECTOMY       Social History     Substance and Sexual Activity   Alcohol Use No    Comment: stopped drinking 9 years ago     Social History     Substance and Sexual Activity   Drug Use No     Social History     Tobacco Use   Smoking Status Never Smoker   Smokeless Tobacco Never Used     Family History:   Family History   Problem Relation Age of Onset    Cancer Mother         colon    Ulcerative colitis Mother     Alzheimer's disease Father     Heart disease Sister         MI x4-angioplasty x4 stents    Colonic polyp Sister     Ulcerative colitis Sister     Cancer Brother         brain tumor-age 6    Cancer Brother 72        prostate    Arthritis Family     Osteoarthritis Family     Crohn's disease Neg Hx     Liver cancer Neg Hx        Meds/Allergies     Allergies   Allergen Reactions    Omnipaque [Iohexol] Other (See Comments)     Shakes, "passed out"    Pneumovax 23 [Pneumococcal Vac Polyvalent] Hives    Iodine     Iv Dye  [Iodinated Diagnostic Agents] Confusion     Annotation - [id number]: shaking    Aspirin GI Intolerance and Nausea Only     Reaction Date: [id number]; Annotation - [id number]: 325mg causes bleeding  Cannot take a dose higher than 81mg-pt has a hx of ulcer    Codeine GI Intolerance     N/V    Flexeril [Cyclobenzaprine]      Unknown reaction per pt   Allergy was noted on physicians note    Latex Rash    Other Rash     Adhesive Tape-caused a rash       Current Outpatient Medications:     albuterol (ACCUNEB) 0 63 MG/3ML nebulizer solution, Take 1 ampule by nebulization every 6 (six) hours as needed for wheezing, Disp: , Rfl:     albuterol (PROVENTIL HFA,VENTOLIN HFA) 90 mcg/act inhaler, Inhale 2 puffs every 6 (six) hours as needed for wheezing , Disp: , Rfl:     aspirin 81 mg chewable tablet, Chew 81 mg every morning  , Disp: , Rfl:     atorvastatin (LIPITOR) 20 mg tablet, Take 20 mg by mouth daily, Disp: , Rfl:     docusate sodium (COLACE) 100 mg capsule, Take 100 mg by mouth daily, Disp: , Rfl:     donepezil (ARICEPT) 5 mg tablet, Take 5 mg by mouth daily, Disp: , Rfl: 5    iron polysaccharides (FERREX 150) 150 mg capsule, Take 150 mg by mouth daily  , Disp: , Rfl:     levothyroxine 100 mcg tablet, Take 100 mcg by mouth daily, Disp: , Rfl:     melatonin 3 mg, Take 3 mg by mouth daily at bedtime as needed, Disp: , Rfl:     multivitamin (THERAGRAN) TABS, Take 1 tablet by mouth every morning, Disp: , Rfl:     PARoxetine (PAXIL) 10 mg tablet, Take 20 mg by mouth daily , Disp: , Rfl:     Probiotic Product (PROBIOTIC-10 PO), Take by mouth daily, Disp: , Rfl:     Lactobacillus (ACIDOPHILUS) 100 MG CAPS, Take by mouth daily, Disp: , Rfl:     pantoprazole (PROTONIX) 20 mg tablet, Take 40 mg by mouth daily , Disp: , Rfl:     Vitals: Blood pressure 124/74, pulse 72, height 5' (1 524 m), weight 67 1 kg (148 lb), SpO2 97 %, not currently breastfeeding  Body mass index is 28 9 kg/m²  Vitals:    07/03/19 1025   Weight: 67 1 kg (148 lb)     BP Readings from Last 3 Encounters:   07/03/19 124/74   01/03/19 124/76   01/02/19 133/72       Physical Exam   Constitutional: She is oriented to person, place, and time  She appears well-developed and well-nourished  No distress  HENT:   Head: Normocephalic and atraumatic  Eyes: Pupils are equal, round, and reactive to light  Neck: Neck supple  No JVD present  No tracheal deviation present  No thyromegaly present  Cardiovascular: Normal rate, regular rhythm, S1 normal and S2 normal  Exam reveals no gallop, no S3, no S4, no distant heart sounds and no friction rub  Murmur heard  Systolic (ejection) murmur is present with a grade of 2/6  Pulmonary/Chest: Effort normal and breath sounds normal  No respiratory distress  She has no wheezes  She has no rales  She exhibits no tenderness  Abdominal: Soft  Bowel sounds are normal  She exhibits no distension  There is no tenderness  Musculoskeletal: She exhibits no edema or deformity  Neurological: She is alert and oriented to person, place, and time     Skin: Skin is warm and dry  No rash noted  She is not diaphoretic  No pallor  Psychiatric: She has a normal mood and affect  Her behavior is normal  Judgment normal        Diagnostic Studies Review Cardio:    Stress Test: Nuclear stress test done in 2012 at by Dr Silas White shows mild to moderate inferior lateral ischemia EF was normal  Catheter done in 2012 and 14 shows no evidence of obstructive coronary artery disease  Echocardiogram/FACUNDO: Echo Doppler done in 2011 shows EF 60%, trace MR, trace TR  Catheterization: Cardiac catheterization done in August 2014 shows mild nonobstructive 25-30% stenosis with normal LV systolic function  was no different than cardiac catheter patient done in 2012  Vascular imaging: Vascular study done in April 2015 shows no significant ICA disease  ECG Report: Twelve-lead EKG done 4/7/2017 shows normal sinus heart rate 76 bpm  No cigarette ST changes  Twelve lead EKG done 01/03/2019 shows normal sinus rhythm heart rate 62 beats per minute  No significant ST changes  No change from old EKG    Twelve lead EKG done 07/03/2019 65 beats per minute normal sinus rhythm no significant ST changes no change from old it is normal EKG          Lab Review   Lab Results   Component Value Date    WBC 6 50 03/13/2018    HGB 11 5 (L) 03/13/2018    HCT 36 4 (L) 03/13/2018    MCV 77 (L) 03/13/2018    RDW 15 4 (H) 03/13/2018     03/13/2018     BMP:  Lab Results   Component Value Date    SODIUM 143 03/13/2018    K 3 9 03/13/2018     03/13/2018    CO2 27 03/13/2018    ANIONGAP 13 4 12/28/2015    BUN 14 03/13/2018    CREATININE 0 68 03/13/2018    GLUCOSE 98 12/28/2015    GLUF 108 (H) 03/13/2018    CALCIUM 9 1 03/13/2018    EGFR 87 03/13/2018     LFT:  Lab Results   Component Value Date    AST 11 03/13/2018    ALT 17 03/13/2018    ALKPHOS 93 03/13/2018    TP 6 7 03/13/2018    ALB 3 5 03/13/2018      Lab Results   Component Value Date    HDA5XNAOOKFD 1 641 03/13/2018     Lipid Profile:   Lab Results   Component Value Date    CHOLESTEROL 170 03/13/2018    HDL 45 03/13/2018    LDLCALC 88 03/13/2018    TRIG 183 (H) 03/13/2018     Lab Results   Component Value Date    CHOLESTEROL 170 03/13/2018    CHOLESTEROL 150 07/07/2016     Lab Results   Component Value Date    CKTOTAL 77 10/06/2017         Dr Wade Vickers MD [facility]      "This note has been constructed using a voice recognition system  Therefore there may be syntax, spelling, and/or grammatical errors   Please call if you have any questions  "

## 2019-07-03 ENCOUNTER — OFFICE VISIT (OUTPATIENT)
Dept: CARDIOLOGY CLINIC | Facility: CLINIC | Age: 74
End: 2019-07-03
Payer: MEDICARE

## 2019-07-03 VITALS
SYSTOLIC BLOOD PRESSURE: 124 MMHG | HEIGHT: 60 IN | DIASTOLIC BLOOD PRESSURE: 74 MMHG | BODY MASS INDEX: 29.06 KG/M2 | HEART RATE: 72 BPM | WEIGHT: 148 LBS | OXYGEN SATURATION: 97 %

## 2019-07-03 DIAGNOSIS — E78.2 MIXED HYPERLIPIDEMIA: ICD-10-CM

## 2019-07-03 DIAGNOSIS — I25.10 ARTERIOSCLEROTIC CORONARY ARTERY DISEASE: ICD-10-CM

## 2019-07-03 DIAGNOSIS — I10 BENIGN ESSENTIAL HYPERTENSION: ICD-10-CM

## 2019-07-03 DIAGNOSIS — R07.89 ATYPICAL CHEST PAIN: ICD-10-CM

## 2019-07-03 DIAGNOSIS — F41.1 GENERALIZED ANXIETY DISORDER: ICD-10-CM

## 2019-07-03 DIAGNOSIS — M54.12 CERVICAL RADICULOPATHY: ICD-10-CM

## 2019-07-03 DIAGNOSIS — K44.9 HIATAL HERNIA: ICD-10-CM

## 2019-07-03 PROCEDURE — 99214 OFFICE O/P EST MOD 30 MIN: CPT | Performed by: INTERNAL MEDICINE

## 2019-07-03 PROCEDURE — 93000 ELECTROCARDIOGRAM COMPLETE: CPT | Performed by: INTERNAL MEDICINE

## 2019-07-03 RX ORDER — YOHIMBE BARK 500 MG
CAPSULE ORAL DAILY
COMMUNITY
End: 2022-05-02

## 2019-08-06 ENCOUNTER — TELEPHONE (OUTPATIENT)
Dept: CARDIOLOGY CLINIC | Facility: CLINIC | Age: 74
End: 2019-08-06

## 2020-01-09 NOTE — PROGRESS NOTES
Progress Note - Cardiology Office  [facility]    Valentin Mortimer A Shillis 75 y o  female MRN: [id number]  : 1945  Encounter: [id number]      Assessment:     1  Arteriosclerotic coronary artery disease    2  Benign essential hypertension    3  Mixed hyperlipidemia    4  Generalized anxiety disorder    5  Cervical radiculopathy    6  Hypothyroidism, unspecified type    7  Tachycardia        Discussion Summary and Plan:    1  Tachycardia  No clear etiology  Partially may be related to anxiety  Patient is not actively wheezing, patient has recent bronchitis and has recent blood test done including thyroid function  She will send us a copy in the meantime start on low-dose metoprolol  As per family she gets very anxious quickly  2  Coronary artery disease  Status post cardiac catheterization twice in  and  with mild nonobstructive coronary artery disease  Continue medical Rx with aspirin statin  No symptoms of angina    3  Dyslipidemia  She has Lipitor 10 milligram   Scheduled to have blood test will send us a copy  4  Hypothyroidism  On Synthroid  She follows with her primary care doctor  Will send us copy of blood test    5  History of DJD/back pain and fibromyalgia  Stable  Many of her medications were discontinued as she was getting memory loss  Currently she is on Aricept  She has adequate family support    6  Anxiety  Patient was reassured that her symptoms of less likely to be cardiac in origin  7  Large hiatal hernia status post procedure by endoscopy for treatment not doing better  Follow up with GI  Please call [phone], if any questions  Counseling :  A description of the counseling  Goals and Barriers  Patient's ability to self care: Yes  Medication side effect reviewed with patient in detail and all their questions answered to their satisfaction  HPI :     Barbara Harvey is a 75y o  year old female who came for follow up   Patient has with a past medical history significant for mild nonobstructive coronary artery disease cardiac catheterization, dyslipidemia, back pain, asthma who came to see us for routine follow-up  Patient complains of some atypical chest pain which had one episode a few weeks ago with no relation to exercise  Patient is pretty active and walks every day with no symptoms of any chest pain or any shortness of breath  She has no fever no chills no nausea no vomiting no other significant complaint     01/14/2020  Above reviewed  Patient came for follow-up  She is having shorter memory loss otherwise she is doing well  Lately she was having issues with bronchitis she was started on inhaler she has not started her heart rate remains high  She gets very easily anxious  She is a catheterization done few years ago which shows nonobstructive disease she is on low-dose cholesterol medicine  Otherwise she is doing well  No nausea no vomiting no fever no chills no PND no orthopnea no other issues today heart rate is 102 beats per minute  Patient just had blood test done yesterday we do not have copy of that  It was done in [facility]     Review of Systems   Constitutional: Negative for activity change, chills, diaphoresis, fever and unexpected weight change  HENT: Negative for congestion  Eyes: Negative for discharge and redness  Respiratory: Positive for cough  Negative for chest tightness, shortness of breath and wheezing  Cardiovascular: Negative  Negative for chest pain, palpitations and leg swelling  Tachycardia   Gastrointestinal: Negative for abdominal pain, diarrhea and nausea  Endocrine: Negative  Genitourinary: Negative for decreased urine volume and urgency  Musculoskeletal: Negative  Negative for arthralgias, back pain and gait problem  Skin: Negative for rash and wound  Allergic/Immunologic: Negative      Neurological: Negative for dizziness, seizures, syncope, weakness, light-headedness and headaches  Hematological: Negative  Psychiatric/Behavioral: Negative for agitation and confusion  The patient is nervous/anxious  Historical Information   Past Medical History:   Diagnosis Date    Allergic rhinitis     Last Assessed:10/22/2014    Alzheimer disease ([facility] )     [facility]     Ankle fracture, right     casted    Anxiety     Arthritis     Asthma     Cisneros esophagus     Bursitis     Constipation     Cystitis     chronic    Diverticulosis     Fibromyalgia     Fibromyalgia, primary     GERD (gastroesophageal reflux disease)     H/o Lyme disease     Hiatal hernia     History of Clostridium difficile infection     Hyperlipidemia     Hypothyroid     hypo    IBS (irritable bowel syndrome)     Labral tear of shoulder     left    Lichen sclerosus of female genitalia 2016    Murmur     Neck pain, chronic     gets steroid injections-none since late 2016    Neuralgia     Oral lichen planus     Peritonitis (HCC)     Spinal stenosis     Ulcer     Ulcer of gastric fundus     Wears glasses     Wears partial dentures     upper and lower     Past Surgical History:   Procedure Laterality Date    ABDOMINAL SURGERY      exploratory-removal of appendix    APPENDECTOMY      CARDIAC CATHETERIZATION      CARPAL TUNNEL RELEASE Bilateral     CHOLECYSTECTOMY      COLONOSCOPY      sigmoid diverticulosis,5mm rectal tubular adenoma5/06    DILATION AND CURETTAGE OF UTERUS      x3 miscarriages    FOOT SURGERY Right     5th toe-hammertoe repair    FOOT SURGERY      HIATAL HERNIA REPAIR      HYSTERECTOMY  01/09/2012    complete ; for bleeding,tubal ligation,vaginal prolapse and rectocele repair    OVARIAN CYST REMOVAL      NV COLONOSCOPY FLX DX W/COLLJ SPEC WHEN PFRMD N/A 10/23/2017    Procedure: EGD AND COLONOSCOPY;  Surgeon: Carlita Lawrence MD;  [facility];   Service: Gastroenterology    NV SHLDR ARTHROSCOP,PART ACROMIOPLAS Left 11/13/2017    Procedure: SHOULDER ARTHROSCOPY WITH SUBACROMIAL DECOMPRESSION, ROTATOR CUFF REPAIR;  Surgeon: Kristel Tamayo MD;  Location: [facility] MAIN OR;  Service: Orthopedics    ROTATOR CUFF REPAIR Right     TONSILLECTOMY       Social History     Substance and Sexual Activity   Alcohol Use No    Comment: stopped drinking 9 years ago     Social History     Substance and Sexual Activity   Drug Use No     Social History     Tobacco Use   Smoking Status Never Smoker   Smokeless Tobacco Never Used     Family History:   Family History   Problem Relation Age of Onset    Cancer Mother         colon    Ulcerative colitis Mother     Alzheimer's disease Father     Heart disease Sister         MI x4-angioplasty x4 stents    Colonic polyp Sister     Ulcerative colitis Sister     Cancer Brother         brain tumor-age 6    Cancer Brother 72        prostate    Arthritis Family     Osteoarthritis Family     Crohn's disease Neg Hx     Liver cancer Neg Hx        Meds/Allergies     Allergies   Allergen Reactions    Omnipaque [Iohexol] Other (See Comments)     Shakes, "passed out"    Pneumovax 23 [Pneumococcal Vac Polyvalent] Hives    Iodine     Iv Dye  [Iodinated Diagnostic Agents] Confusion     Annotation - [id number]: shaking    Aspirin GI Intolerance and Nausea Only     Reaction Date: [id number]; Annotation - [id number]: 325mg causes bleeding  Cannot take a dose higher than 81mg-pt has a hx of ulcer    Codeine GI Intolerance     N/V    Flexeril [Cyclobenzaprine]      Unknown reaction per pt   Allergy was noted on physicians note    Latex Rash    Other Rash     Adhesive Tape-caused a rash       Current Outpatient Medications:     albuterol (ACCUNEB) 0 63 MG/3ML nebulizer solution, Take 1 ampule by nebulization every 6 (six) hours as needed for wheezing, Disp: , Rfl:     ammonium lactate (LAC-HYDRIN) 12 % lotion, Apply topically 2 (two) times a day as needed for dry skin, Disp: , Rfl:     amoxicillin (AMOXIL) 500 mg capsule, Take 500 mg by mouth every 8 (eight) hours, Disp: , Rfl:     aspirin 81 mg chewable tablet, Chew 81 mg every morning  , Disp: , Rfl:     atorvastatin (LIPITOR) 20 mg tablet, Take 10 mg by mouth daily , Disp: , Rfl:     docusate sodium (COLACE) 100 mg capsule, Take 100 mg by mouth daily, Disp: , Rfl:     donepezil (ARICEPT) 5 mg tablet, Take 5 mg by mouth daily, Disp: , Rfl: 5    iron polysaccharides (FERREX 150) 150 mg capsule, Take 150 mg by mouth daily  , Disp: , Rfl:     Lactobacillus (ACIDOPHILUS) 100 MG CAPS, Take by mouth daily, Disp: , Rfl:     levothyroxine 100 mcg tablet, Take 88 mcg by mouth daily , Disp: , Rfl:     melatonin 3 mg, Take 3 mg by mouth daily at bedtime as needed, Disp: , Rfl:     multivitamin (THERAGRAN) TABS, Take 1 tablet by mouth every morning, Disp: , Rfl:     PARoxetine (PAXIL) 10 mg tablet, Take 30 mg by mouth daily , Disp: , Rfl:     triamcinolone (KENALOG) 0 025 % ointment, Apply topically 2 (two) times a day, Disp: , Rfl:     vitamin B-12 (VITAMIN B-12) 1,000 mcg tablet, Take 3,000 mcg by mouth daily, Disp: , Rfl:     albuterol (PROVENTIL HFA,VENTOLIN HFA) 90 mcg/act inhaler, Inhale 2 puffs every 6 (six) hours as needed for wheezing , Disp: , Rfl:     metoprolol succinate (TOPROL-XL) 25 mg 24 hr tablet, Take 1 tablet (25 mg total) by mouth daily, Disp: 30 tablet, Rfl: 1    pantoprazole (PROTONIX) 20 mg tablet, Take 40 mg by mouth daily , Disp: , Rfl:     Probiotic Product (PROBIOTIC-10 PO), Take by mouth daily, Disp: , Rfl:     Vitals: Blood pressure 120/82, pulse 102, height 5' (1 524 m), SpO2 96 %, not currently breastfeeding  Body mass index is 28 9 kg/m²  Vitals:     BP Readings from Last 3 Encounters:   01/14/20 120/82   07/03/19 124/74   01/03/19 124/76       Physical Exam   Constitutional: She is oriented to person, place, and time  She appears well-developed and well-nourished  No distress     HENT:   Head: Normocephalic and atraumatic  Eyes: Pupils are equal, round, and reactive to light  Neck: Neck supple  No JVD present  No tracheal deviation present  No thyromegaly present  Cardiovascular: Normal rate, regular rhythm, S1 normal, S2 normal and intact distal pulses  Exam reveals no gallop, no S3, no S4, no distant heart sounds and no friction rub  Murmur heard  Systolic (ejection) murmur is present with a grade of 2/6  Pulmonary/Chest: Effort normal and breath sounds normal  No respiratory distress  She has no wheezes  She has no rales  She exhibits no tenderness  Abdominal: Soft  Bowel sounds are normal  She exhibits no distension  There is no tenderness  Musculoskeletal: She exhibits no edema or deformity  Neurological: She is alert and oriented to person, place, and time  Skin: Skin is warm and dry  No rash noted  She is not diaphoretic  No pallor  Psychiatric: She has a normal mood and affect  Her behavior is normal  Judgment normal        Diagnostic Studies Review Cardio:    Stress Test: Nuclear stress test done in 2012 at by Dr Solis Spindle shows mild to moderate inferior lateral ischemia EF was normal  Catheter done in 2012 and 14 shows no evidence of obstructive coronary artery disease  Echocardiogram/FACUNDO: Echo Doppler done in 2011 shows EF 60%, trace MR, trace TR  Catheterization: Cardiac catheterization done in August 2014 shows mild nonobstructive 25-30% stenosis with normal LV systolic function  was no different than cardiac catheter patient done in 2012  Vascular imaging: Vascular study done in April 2015 shows no significant ICA disease  ECG Report: Twelve-lead EKG done 4/7/2017 shows normal sinus heart rate 76 bpm  No cigarette ST changes  Twelve lead EKG done 01/03/2019 shows normal sinus rhythm heart rate 62 beats per minute  No significant ST changes    No change from old EKG    Twelve lead EKG done 07/03/2019 65 beats per minute normal sinus rhythm no significant ST changes no change from old it is normal EKG  Twelve lead EKG 07/03/2019 shows sinus tachycardia heart rate 102 beats per minute as compared to previous EKGs patient's heart rate is faster  Lab Review   Lab Results   Component Value Date    WBC 6 50 03/13/2018    HGB 11 5 (L) 03/13/2018    HCT 36 4 (L) 03/13/2018    MCV 77 (L) 03/13/2018    RDW 15 4 (H) 03/13/2018     03/13/2018     BMP:  Lab Results   Component Value Date    SODIUM 143 03/13/2018    K 3 9 03/13/2018     03/13/2018    CO2 27 03/13/2018    ANIONGAP 13 4 12/28/2015    BUN 14 03/13/2018    CREATININE 0 68 03/13/2018    GLUCOSE 98 12/28/2015    GLUF 108 (H) 03/13/2018    CALCIUM 9 1 03/13/2018    EGFR 87 03/13/2018     LFT:  Lab Results   Component Value Date    AST 11 03/13/2018    ALT 17 03/13/2018    ALKPHOS 93 03/13/2018    TP 6 7 03/13/2018    ALB 3 5 03/13/2018      Lab Results   Component Value Date    POK2OEJNTDML 1 641 03/13/2018     Lipid Profile:   Lab Results   Component Value Date    CHOLESTEROL 170 03/13/2018    HDL 45 03/13/2018    LDLCALC 88 03/13/2018    TRIG 183 (H) 03/13/2018     Lab Results   Component Value Date    CHOLESTEROL 170 03/13/2018    CHOLESTEROL 150 07/07/2016     Lab Results   Component Value Date    CKTOTAL 77 10/06/2017     Last TSH 03/13/2008 was 1 6  Dr Reta Bland MD [facility]      "This note has been constructed using a voice recognition system  Therefore there may be syntax, spelling, and/or grammatical errors   Please call if you have any questions  "

## 2020-01-14 ENCOUNTER — OFFICE VISIT (OUTPATIENT)
Dept: CARDIOLOGY CLINIC | Facility: CLINIC | Age: 75
End: 2020-01-14
Payer: MEDICARE

## 2020-01-14 VITALS
BODY MASS INDEX: 28.9 KG/M2 | DIASTOLIC BLOOD PRESSURE: 82 MMHG | HEIGHT: 60 IN | HEART RATE: 102 BPM | SYSTOLIC BLOOD PRESSURE: 120 MMHG | OXYGEN SATURATION: 96 %

## 2020-01-14 DIAGNOSIS — E78.2 MIXED HYPERLIPIDEMIA: ICD-10-CM

## 2020-01-14 DIAGNOSIS — R00.0 TACHYCARDIA: ICD-10-CM

## 2020-01-14 DIAGNOSIS — M54.12 CERVICAL RADICULOPATHY: ICD-10-CM

## 2020-01-14 DIAGNOSIS — F41.1 GENERALIZED ANXIETY DISORDER: ICD-10-CM

## 2020-01-14 DIAGNOSIS — I25.10 ARTERIOSCLEROTIC CORONARY ARTERY DISEASE: ICD-10-CM

## 2020-01-14 DIAGNOSIS — E03.9 HYPOTHYROIDISM, UNSPECIFIED TYPE: ICD-10-CM

## 2020-01-14 DIAGNOSIS — I10 BENIGN ESSENTIAL HYPERTENSION: ICD-10-CM

## 2020-01-14 PROCEDURE — 99214 OFFICE O/P EST MOD 30 MIN: CPT | Performed by: INTERNAL MEDICINE

## 2020-01-14 PROCEDURE — 93000 ELECTROCARDIOGRAM COMPLETE: CPT | Performed by: INTERNAL MEDICINE

## 2020-01-14 RX ORDER — TRIAMCINOLONE ACETONIDE 0.25 MG/G
OINTMENT TOPICAL 2 TIMES DAILY
COMMUNITY
End: 2022-05-02

## 2020-01-14 RX ORDER — METOPROLOL SUCCINATE 25 MG/1
25 TABLET, EXTENDED RELEASE ORAL DAILY
Qty: 30 TABLET | Refills: 1 | Status: SHIPPED | OUTPATIENT
Start: 2020-01-14 | End: 2020-02-06

## 2020-01-14 RX ORDER — AMOXICILLIN 500 MG/1
500 CAPSULE ORAL EVERY 8 HOURS SCHEDULED
COMMUNITY
End: 2022-05-02

## 2020-01-14 RX ORDER — LANOLIN ALCOHOL/MO/W.PET/CERES
1000 CREAM (GRAM) TOPICAL DAILY
COMMUNITY

## 2020-01-14 RX ORDER — AMMONIUM LACTATE 12 G/100G
LOTION TOPICAL 2 TIMES DAILY PRN
COMMUNITY
End: 2022-05-02

## 2020-02-06 ENCOUNTER — TELEPHONE (OUTPATIENT)
Dept: CARDIOLOGY CLINIC | Facility: CLINIC | Age: 75
End: 2020-02-06

## 2020-02-06 DIAGNOSIS — R00.0 TACHYCARDIA: ICD-10-CM

## 2020-02-06 RX ORDER — METOPROLOL SUCCINATE 25 MG/1
TABLET, EXTENDED RELEASE ORAL
Qty: 30 TABLET | Refills: 1 | Status: SHIPPED | OUTPATIENT
Start: 2020-02-06 | End: 2020-03-02

## 2020-02-06 NOTE — TELEPHONE ENCOUNTER
Patient received a call from pharmacy that Dr Faina Regalado requesting Rx for Metoprolol  She doesn't know why she is supposed to take it  She said she has never taken it before and didn't realize she any BP issues  Please assist

## 2020-02-29 DIAGNOSIS — R00.0 TACHYCARDIA: ICD-10-CM

## 2020-03-02 RX ORDER — METOPROLOL SUCCINATE 25 MG/1
TABLET, EXTENDED RELEASE ORAL
Qty: 30 TABLET | Refills: 1 | Status: SHIPPED | OUTPATIENT
Start: 2020-03-02 | End: 2020-03-25 | Stop reason: SDUPTHER

## 2020-03-25 DIAGNOSIS — R00.0 TACHYCARDIA: ICD-10-CM

## 2020-03-25 RX ORDER — METOPROLOL SUCCINATE 25 MG/1
TABLET, EXTENDED RELEASE ORAL
Qty: 30 TABLET | Refills: 1 | Status: SHIPPED | OUTPATIENT
Start: 2020-03-25 | End: 2020-04-16

## 2020-04-16 DIAGNOSIS — R00.0 TACHYCARDIA: ICD-10-CM

## 2020-04-16 RX ORDER — METOPROLOL SUCCINATE 25 MG/1
TABLET, EXTENDED RELEASE ORAL
Qty: 30 TABLET | Refills: 1 | Status: SHIPPED | OUTPATIENT
Start: 2020-04-16 | End: 2020-05-12

## 2020-05-12 DIAGNOSIS — R00.0 TACHYCARDIA: ICD-10-CM

## 2020-05-12 RX ORDER — METOPROLOL SUCCINATE 25 MG/1
TABLET, EXTENDED RELEASE ORAL
Qty: 30 TABLET | Refills: 1 | Status: SHIPPED | OUTPATIENT
Start: 2020-05-12 | End: 2020-06-25

## 2020-05-21 ENCOUNTER — TELEMEDICINE (OUTPATIENT)
Dept: CARDIOLOGY CLINIC | Facility: CLINIC | Age: 75
End: 2020-05-21
Payer: MEDICARE

## 2020-05-21 VITALS — BODY MASS INDEX: 30.47 KG/M2 | WEIGHT: 156 LBS

## 2020-05-21 DIAGNOSIS — E03.9 HYPOTHYROIDISM, UNSPECIFIED TYPE: ICD-10-CM

## 2020-05-21 DIAGNOSIS — F41.1 GENERALIZED ANXIETY DISORDER: ICD-10-CM

## 2020-05-21 DIAGNOSIS — E78.2 MIXED HYPERLIPIDEMIA: ICD-10-CM

## 2020-05-21 DIAGNOSIS — I25.10 ARTERIOSCLEROTIC CORONARY ARTERY DISEASE: ICD-10-CM

## 2020-05-21 DIAGNOSIS — R00.0 TACHYCARDIA: ICD-10-CM

## 2020-05-21 DIAGNOSIS — I10 BENIGN ESSENTIAL HYPERTENSION: ICD-10-CM

## 2020-05-21 PROCEDURE — 99214 OFFICE O/P EST MOD 30 MIN: CPT | Performed by: INTERNAL MEDICINE

## 2020-05-21 RX ORDER — MELATONIN
1000 DAILY
COMMUNITY

## 2020-05-21 RX ORDER — CYCLOBENZAPRINE HCL 10 MG
10 TABLET ORAL 2 TIMES DAILY
COMMUNITY
End: 2022-05-02

## 2020-05-21 RX ORDER — CLOBETASOL PROPIONATE 0.5 MG/G
CREAM TOPICAL AS NEEDED
COMMUNITY
End: 2022-05-02

## 2020-05-21 RX ORDER — FAMOTIDINE 40 MG/1
40 TABLET, FILM COATED ORAL DAILY
COMMUNITY
End: 2022-05-02

## 2020-06-25 DIAGNOSIS — R00.0 TACHYCARDIA: ICD-10-CM

## 2020-06-25 RX ORDER — METOPROLOL SUCCINATE 25 MG/1
TABLET, EXTENDED RELEASE ORAL
Qty: 30 TABLET | Refills: 1 | Status: SHIPPED | OUTPATIENT
Start: 2020-06-25 | End: 2020-07-21

## 2020-07-21 DIAGNOSIS — R00.0 TACHYCARDIA: ICD-10-CM

## 2020-07-21 RX ORDER — METOPROLOL SUCCINATE 25 MG/1
TABLET, EXTENDED RELEASE ORAL
Qty: 30 TABLET | Refills: 1 | Status: SHIPPED | OUTPATIENT
Start: 2020-07-21 | End: 2020-08-14

## 2020-08-02 ENCOUNTER — APPOINTMENT (EMERGENCY)
Dept: CT IMAGING | Facility: HOSPITAL | Age: 75
End: 2020-08-02
Payer: MEDICARE

## 2020-08-02 ENCOUNTER — HOSPITAL ENCOUNTER (EMERGENCY)
Facility: HOSPITAL | Age: 75
Discharge: HOME | End: 2020-08-02
Attending: EMERGENCY MEDICINE
Payer: MEDICARE

## 2020-08-02 VITALS
BODY MASS INDEX: 29.71 KG/M2 | SYSTOLIC BLOOD PRESSURE: 152 MMHG | OXYGEN SATURATION: 95 % | HEART RATE: 94 BPM | RESPIRATION RATE: 18 BRPM | DIASTOLIC BLOOD PRESSURE: 70 MMHG | TEMPERATURE: 98.2 F | WEIGHT: 152.12 LBS

## 2020-08-02 DIAGNOSIS — K52.9 GASTROENTERITIS: Primary | ICD-10-CM

## 2020-08-02 DIAGNOSIS — R10.84 GENERALIZED ABDOMINAL PAIN: ICD-10-CM

## 2020-08-02 LAB
ALBUMIN SERPL BCP-MCNC: 4 G/DL (ref 3.5–5)
ALP SERPL-CCNC: 115 U/L (ref 46–116)
ALT SERPL W P-5'-P-CCNC: 30 U/L (ref 12–78)
ANION GAP SERPL CALCULATED.3IONS-SCNC: 10 MMOL/L (ref 4–13)
APTT PPP: 23 SECONDS (ref 23–37)
AST SERPL W P-5'-P-CCNC: 35 U/L (ref 5–45)
ATRIAL RATE: 103 BPM
BASOPHILS # BLD AUTO: 0.06 THOUSANDS/ΜL (ref 0–0.1)
BASOPHILS NFR BLD AUTO: 1 % (ref 0–1)
BILIRUB SERPL-MCNC: 0.38 MG/DL (ref 0.2–1)
BUN SERPL-MCNC: 9 MG/DL (ref 5–25)
CALCIUM SERPL-MCNC: 9 MG/DL (ref 8.3–10.1)
CHLORIDE SERPL-SCNC: 103 MMOL/L (ref 100–108)
CO2 SERPL-SCNC: 24 MMOL/L (ref 21–32)
CREAT SERPL-MCNC: 0.74 MG/DL (ref 0.6–1.3)
EOSINOPHIL # BLD AUTO: 0.02 THOUSAND/ΜL (ref 0–0.61)
EOSINOPHIL NFR BLD AUTO: 0 % (ref 0–6)
ERYTHROCYTE [DISTWIDTH] IN BLOOD BY AUTOMATED COUNT: 18.1 % (ref 11.6–15.1)
GFR SERPL CREATININE-BSD FRML MDRD: 80 ML/MIN/1.73SQ M
GLUCOSE SERPL-MCNC: 111 MG/DL (ref 65–140)
HCT VFR BLD AUTO: 41.7 % (ref 34.8–46.1)
HGB BLD-MCNC: 12.5 G/DL (ref 11.5–15.4)
IMM GRANULOCYTES # BLD AUTO: 0.08 THOUSAND/UL (ref 0–0.2)
IMM GRANULOCYTES NFR BLD AUTO: 1 % (ref 0–2)
INR PPP: 1.01 (ref 0.84–1.19)
LACTATE SERPL-SCNC: 1.9 MMOL/L (ref 0.5–2)
LIPASE SERPL-CCNC: 44 U/L (ref 73–393)
LYMPHOCYTES # BLD AUTO: 1.35 THOUSANDS/ΜL (ref 0.6–4.47)
LYMPHOCYTES NFR BLD AUTO: 17 % (ref 14–44)
MCH RBC QN AUTO: 23 PG (ref 26.8–34.3)
MCHC RBC AUTO-ENTMCNC: 30 G/DL (ref 31.4–37.4)
MCV RBC AUTO: 77 FL (ref 82–98)
MONOCYTES # BLD AUTO: 0.53 THOUSAND/ΜL (ref 0.17–1.22)
MONOCYTES NFR BLD AUTO: 7 % (ref 4–12)
NEUTROPHILS # BLD AUTO: 5.88 THOUSANDS/ΜL (ref 1.85–7.62)
NEUTS SEG NFR BLD AUTO: 74 % (ref 43–75)
NRBC BLD AUTO-RTO: 0 /100 WBCS
P AXIS: 73 DEGREES
PLATELET # BLD AUTO: 330 THOUSANDS/UL (ref 149–390)
PMV BLD AUTO: 10 FL (ref 8.9–12.7)
POTASSIUM SERPL-SCNC: 4.1 MMOL/L (ref 3.5–5.3)
PR INTERVAL: 158 MS
PROT SERPL-MCNC: 8 G/DL (ref 6.4–8.2)
PROTHROMBIN TIME: 13.4 SECONDS (ref 11.6–14.5)
QRS AXIS: -27 DEGREES
QRSD INTERVAL: 74 MS
QT INTERVAL: 308 MS
QTC INTERVAL: 403 MS
RBC # BLD AUTO: 5.43 MILLION/UL (ref 3.81–5.12)
SARS-COV-2 RNA RESP QL NAA+PROBE: NEGATIVE
SODIUM SERPL-SCNC: 137 MMOL/L (ref 136–145)
T WAVE AXIS: 73 DEGREES
TSH SERPL DL<=0.05 MIU/L-ACNC: 4.51 UIU/ML (ref 0.36–3.74)
VENTRICULAR RATE: 103 BPM
WBC # BLD AUTO: 7.92 THOUSAND/UL (ref 4.31–10.16)

## 2020-08-02 PROCEDURE — G1004 CDSM NDSC: HCPCS

## 2020-08-02 PROCEDURE — 36415 COLL VENOUS BLD VENIPUNCTURE: CPT | Performed by: PHYSICIAN ASSISTANT

## 2020-08-02 PROCEDURE — 93010 ELECTROCARDIOGRAM REPORT: CPT | Performed by: INTERNAL MEDICINE

## 2020-08-02 PROCEDURE — 99284 EMERGENCY DEPT VISIT MOD MDM: CPT

## 2020-08-02 PROCEDURE — 85025 COMPLETE CBC W/AUTO DIFF WBC: CPT | Performed by: PHYSICIAN ASSISTANT

## 2020-08-02 PROCEDURE — 87635 SARS-COV-2 COVID-19 AMP PRB: CPT | Performed by: PHYSICIAN ASSISTANT

## 2020-08-02 PROCEDURE — 84443 ASSAY THYROID STIM HORMONE: CPT | Performed by: PHYSICIAN ASSISTANT

## 2020-08-02 PROCEDURE — 74176 CT ABD & PELVIS W/O CONTRAST: CPT

## 2020-08-02 PROCEDURE — 80053 COMPREHEN METABOLIC PANEL: CPT | Performed by: PHYSICIAN ASSISTANT

## 2020-08-02 PROCEDURE — 85610 PROTHROMBIN TIME: CPT | Performed by: PHYSICIAN ASSISTANT

## 2020-08-02 PROCEDURE — 93005 ELECTROCARDIOGRAM TRACING: CPT

## 2020-08-02 PROCEDURE — 99284 EMERGENCY DEPT VISIT MOD MDM: CPT | Performed by: PHYSICIAN ASSISTANT

## 2020-08-02 PROCEDURE — 96374 THER/PROPH/DIAG INJ IV PUSH: CPT

## 2020-08-02 PROCEDURE — 87040 BLOOD CULTURE FOR BACTERIA: CPT | Performed by: PHYSICIAN ASSISTANT

## 2020-08-02 PROCEDURE — 83605 ASSAY OF LACTIC ACID: CPT | Performed by: PHYSICIAN ASSISTANT

## 2020-08-02 PROCEDURE — 96361 HYDRATE IV INFUSION ADD-ON: CPT

## 2020-08-02 PROCEDURE — 83690 ASSAY OF LIPASE: CPT | Performed by: PHYSICIAN ASSISTANT

## 2020-08-02 PROCEDURE — 85730 THROMBOPLASTIN TIME PARTIAL: CPT | Performed by: PHYSICIAN ASSISTANT

## 2020-08-02 RX ORDER — ONDANSETRON 4 MG/1
4 TABLET, FILM COATED ORAL EVERY 6 HOURS
Qty: 12 TABLET | Refills: 0 | Status: SHIPPED | OUTPATIENT
Start: 2020-08-02 | End: 2020-08-14

## 2020-08-02 RX ORDER — MECLIZINE HCL 12.5 MG/1
TABLET ORAL AS NEEDED
COMMUNITY
End: 2022-05-02

## 2020-08-02 RX ORDER — FLUTICASONE FUROATE AND VILANTEROL TRIFENATATE 100; 25 UG/1; UG/1
1 POWDER RESPIRATORY (INHALATION) DAILY
COMMUNITY

## 2020-08-02 RX ORDER — ONDANSETRON 2 MG/ML
4 INJECTION INTRAMUSCULAR; INTRAVENOUS ONCE
Status: COMPLETED | OUTPATIENT
Start: 2020-08-02 | End: 2020-08-02

## 2020-08-02 RX ADMIN — SODIUM CHLORIDE 1000 ML: 0.9 INJECTION, SOLUTION INTRAVENOUS at 09:59

## 2020-08-02 RX ADMIN — ONDANSETRON 4 MG: 2 INJECTION INTRAMUSCULAR; INTRAVENOUS at 11:00

## 2020-08-02 NOTE — ED NOTES
Denies nausea, educated to keep right arm straight for IVF to infuse       Gilles Lanes, RN  08/02/20 [id number]

## 2020-08-02 NOTE — ED PROVIDER NOTES
History  Chief Complaint   Patient presents with    Vomiting     Pt presents to the ED with N/V/D x 3 days  Intermittent Fevers  History provided by:  Patient  Vomiting   Severity:  Moderate  Duration:  3 days  Timing:  Intermittent  Number of daily episodes:  Greater than 10  Progression:  Unchanged  Chronicity:  New  Recent urination:  Normal  Context: not post-tussive and not self-induced    Relieved by:  None tried  Worsened by:  Liquids  Ineffective treatments:  None tried  Associated symptoms: abdominal pain    Associated symptoms: no arthralgias, no chills, no cough, no diarrhea, no fever, no headaches, no myalgias, no sore throat and no URI    Risk factors: no alcohol use, no diabetes, not pregnant, no prior abdominal surgery, no sick contacts, no suspect food intake and no travel to endemic areas        Prior to Admission Medications   Prescriptions Last Dose Informant Patient Reported? Taking? Lactobacillus (ACIDOPHILUS) 100 MG CAPS  Self Yes Yes   Sig: Take by mouth daily   Levothyroxine Sodium 100 MCG CAPS  Self Yes Yes   Sig: Take 100 mcg by mouth daily    PARoxetine (PAXIL) 20 mg tablet  Self Yes Yes   Sig: Take 20 mg by mouth daily    Probiotic Product (PROBIOTIC-10 PO)  Self Yes Yes   Sig: Take by mouth daily   albuterol (ACCUNEB) 0 63 MG/3ML nebulizer solution  Self Yes Yes   Sig: Take 1 ampule by nebulization every 6 (six) hours as needed for wheezing   albuterol (PROVENTIL HFA,VENTOLIN HFA) 90 mcg/act inhaler  Self Yes Yes   Sig: Inhale 2 puffs every 6 (six) hours as needed for wheezing     ammonium lactate (LAC-HYDRIN) 12 % lotion Not Taking at Unknown time Self Yes No   Sig: Apply topically 2 (two) times a day as needed for dry skin   amoxicillin (AMOXIL) 500 mg capsule Not Taking at Unknown time Self Yes No   Sig: Take 500 mg by mouth every 8 (eight) hours   aspirin 81 mg chewable tablet  Self Yes Yes   Sig: Chew 81 mg every morning     atorvastatin (LIPITOR) 10 mg tablet  Self Yes Yes Sig: Take 10 mg by mouth daily    cholecalciferol (VITAMIN D3) 1,000 units tablet Not Taking at Unknown time Self Yes No   Sig: Take 1,000 Units by mouth daily   clobetasol (TEMOVATE) 0 05 % cream Not Taking at Unknown time Self Yes No   Sig: Apply topically 2 (two) times a day   cyclobenzaprine (FLEXERIL) 10 mg tablet  Self Yes Yes   Sig: Take 10 mg by mouth 2 (two) times a day   docusate sodium (COLACE) 100 mg capsule  Self Yes Yes   Sig: Take 100 mg by mouth daily   donepezil (ARICEPT) 5 mg tablet  Self Yes Yes   Sig: Take 5 mg by mouth daily   famotidine (PEPCID) 40 MG tablet  Self Yes Yes   Sig: Take 40 mg by mouth daily   fluticasone-vilanterol (Breo Ellipta) 100-25 mcg/inh inhaler   Yes Yes   Sig: Inhale 1 puff daily Rinse mouth after use     iron polysaccharides (FERREX 150) 150 mg capsule  Self Yes Yes   Sig: Take 150 mg by mouth daily     meclizine (ANTIVERT) 12 5 MG tablet   Yes Yes   Sig: Take by mouth   melatonin 3 mg  Self Yes No   Sig: Take 3 mg by mouth daily at bedtime as needed   metoprolol succinate (TOPROL-XL) 25 mg 24 hr tablet   No Yes   Sig: TAKE 1 TABLET BY MOUTH EVERY DAY   multivitamin (THERAGRAN) TABS  Self Yes Yes   Sig: Take 1 tablet by mouth every morning   pantoprazole (PROTONIX) 20 mg tablet Not Taking at Unknown time Self Yes No   Sig: Take 40 mg by mouth daily    triamcinolone (KENALOG) 0 025 % ointment Not Taking at Unknown time Self Yes No   Sig: Apply topically 2 (two) times a day   vitamin B-12 (VITAMIN B-12) 1,000 mcg tablet Not Taking at Unknown time Self Yes No   Sig: Take 3,000 mcg by mouth daily      Facility-Administered Medications: None       Past Medical History:   Diagnosis Date    Allergic rhinitis     Last Assessed:10/22/2014    Alzheimer disease ([facility] [address]     Angina pectoris ([facility] [address]     Ankle fracture, right     casted    Anxiety     Arthritis     Asthma     Cisneros esophagus     Bursitis     Constipation     Cystitis     chronic    Diverticulosis     Fibromyalgia     Fibromyalgia, primary     GERD (gastroesophageal reflux disease)     H/o Lyme disease     Hiatal hernia     History of Clostridium difficile infection     Hyperlipidemia     Hypothyroid     hypo    IBS (irritable bowel syndrome)     Labral tear of shoulder     left    Lichen sclerosus of female genitalia 2016    Murmur     Neck pain, chronic     gets steroid injections-none since late 2016    Neuralgia     Oral lichen planus     Peritonitis (HCC)     Spinal stenosis     Ulcer     Ulcer of gastric fundus     Wears glasses     Wears partial dentures     upper and lower       Past Surgical History:   Procedure Laterality Date    ABDOMINAL SURGERY      exploratory-removal of appendix    APPENDECTOMY      CARDIAC CATHETERIZATION      CARPAL TUNNEL RELEASE Bilateral     CHOLECYSTECTOMY      COLONOSCOPY      sigmoid diverticulosis,5mm rectal tubular adenoma5/06    DILATION AND CURETTAGE OF UTERUS      x3 miscarriages    FOOT SURGERY Right     5th toe-hammertoe repair    FOOT SURGERY      HIATAL HERNIA REPAIR      HYSTERECTOMY  01/09/2012    complete ; for bleeding,tubal ligation,vaginal prolapse and rectocele repair    OVARIAN CYST REMOVAL      NC COLONOSCOPY FLX DX W/COLLJ SPEC WHEN PFRMD N/A 10/23/2017    Procedure: EGD AND COLONOSCOPY;  Surgeon: Payton Huizar MD;  Location: [facility];   Service: Gastroenterology    NC DAR Treviño Left 11/13/2017    Procedure: SHOULDER ARTHROSCOPY WITH SUBACROMIAL DECOMPRESSION, ROTATOR CUFF REPAIR;  Surgeon: Louis Mercedes MD;  Location: [facility] MAIN OR;  Service: Orthopedics    ROTATOR CUFF REPAIR Right     TONSILLECTOMY         Family History   Problem Relation Age of Onset    Cancer Mother         colon    Ulcerative colitis Mother     Alzheimer's disease Father     Heart disease Sister         MI x4-angioplasty x4 stents    Colonic polyp Sister     Ulcerative colitis Sister     Cancer Brother brain tumor-age 11    Cancer Brother 72        prostate    Arthritis Family     Osteoarthritis Family     Crohn's disease Neg Hx     Liver cancer Neg Hx      I have reviewed and agree with the history as documented  E-Cigarette/Vaping     E-Cigarette/Vaping Substances     Social History     Tobacco Use    Smoking status: Never Smoker    Smokeless tobacco: Never Used   Substance Use Topics    Alcohol use: No     Comment: stopped drinking 9 years ago    Drug use: No       Review of Systems   Constitutional: Positive for activity change, appetite change and fatigue  Negative for chills, diaphoresis and fever  HENT: Negative for congestion, dental problem, ear discharge, ear pain, nosebleeds, postnasal drip, rhinorrhea, sore throat and trouble swallowing  Eyes: Negative for pain, discharge, redness and itching  Respiratory: Negative for cough, shortness of breath and wheezing  Cardiovascular: Negative for chest pain and palpitations  Gastrointestinal: Positive for abdominal pain and vomiting  Negative for diarrhea  Genitourinary: Negative for dysuria, frequency, hematuria and urgency  Musculoskeletal: Negative for arthralgias and myalgias  Skin: Negative for color change, pallor and rash  Neurological: Negative for headaches  Psychiatric/Behavioral: Negative for confusion  Physical Exam  Physical Exam  Vitals signs and nursing note reviewed  Exam conducted with a chaperone present  Constitutional:       General: She is not in acute distress  Appearance: Normal appearance  She is not ill-appearing  HENT:      Head: Normocephalic  Nose: Nose normal    Neck:      Musculoskeletal: Neck supple  No neck rigidity  Cardiovascular:      Rate and Rhythm: Normal rate and regular rhythm  Heart sounds: Murmur present  Pulmonary:      Effort: Pulmonary effort is normal       Breath sounds: Normal breath sounds  Abdominal:      General: Abdomen is flat   There is no distension  Tenderness: There is abdominal tenderness  There is no guarding or rebound  Comments: Generalized abdominal tenderness   Lymphadenopathy:      Cervical: No cervical adenopathy  Skin:     General: Skin is warm  Capillary Refill: Capillary refill takes less than 2 seconds  Neurological:      Mental Status: She is alert and oriented to person, place, and time  Psychiatric:         Mood and Affect: Mood normal          Behavior: Behavior normal          Thought Content: Thought content normal          Judgment: Judgment normal          Vital Signs  ED Triage Vitals [08/02/20 0940]   Temperature Pulse Respirations Blood Pressure SpO2   98 2 °F (36 8 °C) (!) 119 20 (!) 174/93 97 %      Temp Source Heart Rate Source Patient Position - Orthostatic VS BP Location FiO2 (%)   Oral Monitor Sitting Right arm --      Pain Score       3           Vitals:    08/02/20 0940 08/02/20 1045 08/02/20 1100   BP: (!) 174/93 149/67 152/70   Pulse: (!) 119 94 94   Patient Position - Orthostatic VS: Sitting           Visual Acuity      ED Medications  Medications   sodium chloride 0 9 % bolus 1,000 mL (0 mL Intravenous Stopped 8/2/20 1149)   ondansetron (ZOFRAN) injection 4 mg (4 mg Intravenous Given 8/2/20 1100)       Diagnostic Studies  Results Reviewed     Procedure Component Value Units Date/Time    Novel Coronavirus [facility] [[id number]]  (Normal) Collected:  08/02/20 1009    Lab Status:  Final result Specimen:  Nares from Nose Updated:  08/02/20 1108     SARS-CoV-2 Negative    Narrative: The specimen collection materials, transport medium, and/or testing methodology utilized in the production of these test results have been proven to be reliable in a limited validation with an abbreviated program under the Emergency Utilization Authorization provided by the FDA    Testing reported as "Presumptive positive" will be confirmed with secondary testing with a reference laboratory to ensure result accuracy  Clinical caution and judgement should be used with the interpretation of these results with consideration of the clinical impression and other laboratory testing  Testing reported as "Positive" or "Negative" has been proven to be accurate according to standard laboratory validation requirements  All testing is performed with control materials showing appropriate reactivity at standard intervals  Lipase [[id number]]  (Abnormal) Collected:  08/02/20 0957    Lab Status:  Final result Specimen:  Blood from Arm, Right Updated:  08/02/20 1048     Lipase 44 u/L     TSH [[id number]]  (Abnormal) Collected:  08/02/20 0957    Lab Status:  Final result Specimen:  Blood from Arm, Right Updated:  08/02/20 1048     TSH 3RD GENERATON 4 508 uIU/mL     Narrative:       Patients undergoing fluorescein dye angiography may retain small amounts of fluorescein in the body for 48-72 hours post procedure  Samples containing fluorescein can produce falsely depressed TSH values  If the patient had this procedure,a specimen should be resubmitted post fluorescein clearance  Lactic acid [[id number]]  (Normal) Collected:  08/02/20 0957    Lab Status:  Final result Specimen:  Blood from Arm, Right Updated:  08/02/20 1046     LACTIC ACID 1 9 mmol/L     Narrative:       Result may be elevated if tourniquet was used during collection      Comprehensive metabolic panel [[id number]] Collected:  08/02/20 0957    Lab Status:  Final result Specimen:  Blood from Arm, Right Updated:  08/02/20 1041     Sodium 137 mmol/L      Potassium 4 1 mmol/L      Chloride 103 mmol/L      CO2 24 mmol/L      ANION GAP 10 mmol/L      BUN 9 mg/dL      Creatinine 0 74 mg/dL      Glucose 111 mg/dL      Calcium 9 0 mg/dL      AST 35 U/L      ALT 30 U/L      Alkaline Phosphatase 115 U/L      Total Protein 8 0 g/dL      Albumin 4 0 g/dL      Total Bilirubin 0 38 mg/dL      eGFR 80 ml/min/1 73sq m     Narrative:       [place] guidelines for Chronic Kidney Disease (CKD):     Stage 1 with normal or high GFR (GFR > 90 mL/min/1 73 square meters)    Stage 2 Mild CKD (GFR = 60-89 mL/min/1 73 square meters)    Stage 3A Moderate CKD (GFR = 45-59 mL/min/1 73 square meters)    Stage 3B Moderate CKD (GFR = 30-44 mL/min/1 73 square meters)    Stage 4 Severe CKD (GFR = 15-29 mL/min/1 73 square meters)    Stage 5 End Stage CKD (GFR <15 mL/min/1 73 square meters)  Note: GFR calculation is accurate only with a steady state creatinine    Protime-INR [[id number]]  (Normal) Collected:  08/02/20 0957    Lab Status:  Final result Specimen:  Blood from Arm, Right Updated:  08/02/20 1034     Protime 13 4 seconds      INR 1 01    APTT [[id number]]  (Normal) Collected:  08/02/20 0957    Lab Status:  Final result Specimen:  Blood from Arm, Right Updated:  08/02/20 1034     PTT 23 seconds     Blood culture #2 [[id number]] Collected:  08/02/20 1013    Lab Status: In process Specimen:  Blood from Arm, Left Updated:  08/02/20 1024    Blood culture #1 [[id number]] Collected:  08/02/20 1013    Lab Status:   In process Specimen:  Blood from Hand, Right Updated:  08/02/20 1024    CBC and differential [[id number]]  (Abnormal) Collected:  08/02/20 0957    Lab Status:  Final result Specimen:  Blood from Arm, Right Updated:  08/02/20 1013     WBC 7 92 Thousand/uL      RBC 5 43 Million/uL      Hemoglobin 12 5 g/dL      Hematocrit 41 7 %      MCV 77 fL      MCH 23 0 pg      MCHC 30 0 g/dL      RDW 18 1 %      MPV 10 0 fL      Platelets 192 Thousands/uL      nRBC 0 /100 WBCs      Neutrophils Relative 74 %      Immat GRANS % 1 %      Lymphocytes Relative 17 %      Monocytes Relative 7 %      Eosinophils Relative 0 %      Basophils Relative 1 %      Neutrophils Absolute 5 88 Thousands/µL      Immature Grans Absolute 0 08 Thousand/uL      Lymphocytes Absolute 1 35 Thousands/µL      Monocytes Absolute 0 53 Thousand/µL      Eosinophils Absolute 0 02 Thousand/µL      Basophils Absolute 0 06 Thousands/µL                  CT abdomen pelvis wo contrast   ED Interpretation by Merle Beltrán PA-C (08/02 1134)   No acute CT abnormalities  Final Result by Yvon Iverson DO (08/02 1121)   No acute CT abnormalities  Workstation performed: [id number]                    Procedures  ECG 12 Lead Documentation Only    Date/Time: 8/2/2020 10:10 AM  Performed by: Merle Beltrán PA-C  Authorized by: Merle Beltrán PA-C     Indications / Diagnosis:  Abd pain  ECG reviewed by me, the ED Provider: yes    Patient location:  ED  Previous ECG:     Previous ECG:  Compared to current    Comparison ECG info:  11/7/17    Similarity:  No change    Comparison to cardiac monitor: Yes    Interpretation:     Interpretation: abnormal    Rate:     ECG rate:  103    ECG rate assessment: normal    Rhythm:     Rhythm: sinus tachycardia    Ectopy:     Ectopy: none    QRS:     QRS axis:  Normal    QRS intervals:  Normal  Conduction:     Conduction: normal    ST segments:     ST segments:  Normal  T waves:     T waves: normal    Comments:      No signs of acute ischemia    Independently interpreted by me             ED Course       US AUDIT      Most Recent Value   Initial Alcohol Screen: US AUDIT-C    1  How often do you have a drink containing alcohol?  0 Filed at: 08/02/2020 0941   2  How many drinks containing alcohol do you have on a typical day you are drinking? 0 Filed at: 08/02/2020 0941   3b  FEMALE Any Age, or MALE 65+: How often do you have 4 or more drinks on one occassion? 0 Filed at: 08/02/2020 0941   Audit-C Score  0 Filed at: 08/02/2020 0941                  LUKE/DAST-10      Most Recent Value   How many times in the past year have you    Used an illegal drug or used a prescription medication for non-medical reasons?   Never Filed at: 08/02/2020 0941                                MDM  Number of Diagnoses or Management Options  Gastroenteritis: new and requires workup  Generalized abdominal pain: new and requires workup     Amount and/or Complexity of Data Reviewed  Clinical lab tests: ordered and reviewed  Tests in the radiology section of CPT®: ordered and reviewed  Tests in the medicine section of CPT®: ordered and reviewed    Risk of Complications, Morbidity, and/or Mortality  Presenting problems: high  Diagnostic procedures: high  Management options: high  General comments: Patient presents emergency room with generalized abdominal pain and vomiting and diarrhea  She was seen and evaluated  Laboratory studies were taken and were reviewed  Her CT scan did not show any acute abdominal process  Patient felt better after being medicated with intravenous Zofran 4 mg  She was discharged home with a prescription for 4 mg of Zofran to take every 8 hours as needed for vomiting  She will be on clear liquids today and then advance for self to a bland diet tomorrow  I did speak to the patient's daughter as well per her request   She will follow up with her family physician in 2 days for recheck exam   She was given reasons to return to the emergency room should her symptoms worsen  Patient Progress  Patient progress: stable        Disposition  Final diagnoses:   Gastroenteritis   Generalized abdominal pain     Time reflects when diagnosis was documented in both MDM as applicable and the Disposition within this note     Time User Action Codes Description Comment    8/2/2020 11:35 AM Lizbeth Herrera Add [K52 9] Gastroenteritis     8/2/2020 11:35 AM Lizbeth Herrera Add [R10 84] Generalized abdominal pain       ED Disposition     ED Disposition Condition Date/Time Comment    Discharge Stable Sun Aug 2, 2020 11:37 AM Xiomara Marquez discharge to home/self care              Follow-up Information     Follow up With Specialties Details Why Contact Info Additional Ziggy Quinteros MD Internal Medicine Schedule an appointment as soon as possible for a visit in 2 days for a recheck [address] [facility] [address] Emergency Department Emergency Medicine  As needed, If symptoms worsen [address] [facility] [address] [phone] [facility], [address], [place], [place], [address]          Discharge Medication List as of 8/2/2020 11:39 AM      START taking these medications    Details   ondansetron (ZOFRAN) 4 mg tablet Take 1 tablet (4 mg total) by mouth every 6 (six) hours for 12 days, Starting Sun 8/2/2020, Until Fri 8/14/2020, Normal         CONTINUE these medications which have NOT CHANGED    Details   albuterol (ACCUNEB) 0 63 MG/3ML nebulizer solution Take 1 ampule by nebulization every 6 (six) hours as needed for wheezing, Historical Med      albuterol (PROVENTIL HFA,VENTOLIN HFA) 90 mcg/act inhaler Inhale 2 puffs every 6 (six) hours as needed for wheezing , Historical Med      ammonium lactate (LAC-HYDRIN) 12 % lotion Apply topically 2 (two) times a day as needed for dry skin, Historical Med      amoxicillin (AMOXIL) 500 mg capsule Take 500 mg by mouth every 8 (eight) hours, Historical Med      aspirin 81 mg chewable tablet Chew 81 mg every morning  , Historical Med      atorvastatin (LIPITOR) 10 mg tablet Take 10 mg by mouth daily , Historical Med      cholecalciferol (VITAMIN D3) 1,000 units tablet Take 1,000 Units by mouth daily, Historical Med      clobetasol (TEMOVATE) 0 05 % cream Apply topically 2 (two) times a day, Historical Med      cyclobenzaprine (FLEXERIL) 10 mg tablet Take 10 mg by mouth 2 (two) times a day, Historical Med      docusate sodium (COLACE) 100 mg capsule Take 100 mg by mouth daily, Historical Med      donepezil (ARICEPT) 5 mg tablet Take 5 mg by mouth daily, Starting Mon 3/5/2018, Historical Med      famotidine (PEPCID) 40 MG tablet Take 40 mg by mouth daily, Historical Med      fluticasone-vilanterol (Breo Ellipta) 100-25 mcg/inh inhaler Inhale 1 puff daily Rinse mouth after use , Historical Med      iron polysaccharides (FERREX 150) 150 mg capsule Take 150 mg by mouth daily  , Historical Med      Lactobacillus (ACIDOPHILUS) 100 MG CAPS Take by mouth daily, Historical Med      Levothyroxine Sodium 100 MCG CAPS Take 100 mcg by mouth daily , Historical Med      meclizine (ANTIVERT) 12 5 MG tablet Take by mouth, Historical Med      melatonin 3 mg Take 3 mg by mouth daily at bedtime as needed, Historical Med      metoprolol succinate (TOPROL-XL) 25 mg 24 hr tablet TAKE 1 TABLET BY MOUTH EVERY DAY, Normal      multivitamin (THERAGRAN) TABS Take 1 tablet by mouth every morning, Historical Med      pantoprazole (PROTONIX) 20 mg tablet Take 40 mg by mouth daily , Historical Med      PARoxetine (PAXIL) 20 mg tablet Take 20 mg by mouth daily , Historical Med      Probiotic Product (PROBIOTIC-10 PO) Take by mouth daily, Historical Med      triamcinolone (KENALOG) 0 025 % ointment Apply topically 2 (two) times a day, Historical Med      vitamin B-12 (VITAMIN B-12) 1,000 mcg tablet Take 3,000 mcg by mouth daily, Historical Med           No discharge procedures on file      PDMP Review     None          ED Provider  Electronically Signed by           Chirag Sofia PA-C  08/02/20 3310

## 2020-08-07 LAB
BACTERIA BLD CULT: NORMAL
BACTERIA BLD CULT: NORMAL

## 2020-08-14 DIAGNOSIS — R00.0 TACHYCARDIA: ICD-10-CM

## 2020-08-14 RX ORDER — METOPROLOL SUCCINATE 25 MG/1
TABLET, EXTENDED RELEASE ORAL
Qty: 30 TABLET | Refills: 1 | Status: SHIPPED | OUTPATIENT
Start: 2020-08-14 | End: 2020-09-11 | Stop reason: SDUPTHER

## 2020-09-11 RX ORDER — METOPROLOL SUCCINATE 25 MG/1
25 TABLET, EXTENDED RELEASE ORAL DAILY
Qty: 90 TABLET | Refills: 3 | Status: SHIPPED | OUTPATIENT
Start: 2020-09-11 | End: 2022-05-02 | Stop reason: SDUPTHER

## 2020-09-27 NOTE — PROGRESS NOTES
Progress Note - Cardiology Office  [facility]    Josr Hills 67 y o  female MRN: [id number]  : 1945  Encounter: [id number]      Assessment:     1  Benign essential hypertension    2  Arteriosclerotic coronary artery disease    3  Tachycardia    4  Mixed hyperlipidemia    5  Hypothyroidism, unspecified type        Discussion Summary and Plan:    1  Tachycardia  Most likely related to anxiety  She is now much better heart rate has improved  Will check echo Doppler for LE LV dysfunction  Continue low-dose metoprolol she is tolerating it very well  TSH was 4 5      2  Coronary artery disease  Status post cardiac catheterization twice in  and  with mild nonobstructive coronary artery disease  Continue medical Rx with aspirin statin  No symptoms of angina    3  Dyslipidemia  She has Lipitor 10 milligram   She had a blood test done 2020  LFTs were acceptable    4  Hypothyroidism  On Synthroid  She follows with her primary care doctor  TSH is slightly high 4 5 management as per medical team    5  History of DJD/back pain and fibromyalgia  Stable  Many of her medications were discontinued as she was getting memory loss  Currently she is on Aricept  Currently doing well    6  Anxiety  Patient was reassured that her symptoms of less likely to be cardiac in origin  7  Large hiatal hernia status post procedure by endoscopy for treatment not doing better  Follow up with GI  Please call [phone], if any questions  Counseling :  A description of the counseling  Goals and Barriers  Patient's ability to self care: Yes  Medication side effect reviewed with patient in detail and all their questions answered to their satisfaction  HPI :     Josr Hills is a 67y o  year old female who came for follow up   Patient has with a past medical history significant for mild nonobstructive coronary artery disease cardiac catheterization, dyslipidemia, back pain, asthma who came to see us for routine follow-up  Patient complains of some atypical chest pain which had one episode a few weeks ago with no relation to exercise  Patient is pretty active and walks every day with no symptoms of any chest pain or any shortness of breath  She has no fever no chills no nausea no vomiting no other significant complaint       09/30/2020  Above reviewed  Patient came for follow-up  She is having short-term memory loss which is not changed  She also has issues with wheezing with exertion  She saw a pulmonologist was told she had a COPD and was given inhalers  It helps her but she is not using it all the time  She has a cardiac catheter is done in 2012 and 2014 found to have mild nonobstructive coronary artery disease  Today her heart rate 68 beats per minute EKG shows no significant changes she is pretty compliant with her medications  She was in the emergency room on 08/02/2020 with nausea vomiting not feeling well COVID test was negative blood test at that time were acceptable  No other issues at this time  Review of Systems   Constitutional: Negative for activity change, chills, diaphoresis, fever and unexpected weight change  HENT: Negative for congestion  Eyes: Negative for discharge and redness  Respiratory: Negative for cough, chest tightness, shortness of breath and wheezing  Cardiovascular: Negative  Negative for chest pain, palpitations and leg swelling  Gastrointestinal: Negative for abdominal pain, diarrhea and nausea  Endocrine: Negative  Genitourinary: Negative for decreased urine volume and urgency  Musculoskeletal: Negative  Negative for arthralgias, back pain and gait problem  Skin: Negative for rash and wound  Allergic/Immunologic: Negative  Neurological: Negative for dizziness, seizures, syncope, weakness, light-headedness and headaches  Hematological: Negative      Psychiatric/Behavioral: Negative for agitation and confusion  The patient is nervous/anxious  Historical Information   Past Medical History:   Diagnosis Date    Allergic rhinitis     Last Assessed:10/22/2014    Alzheimer disease ([facility] [address] )     Angina pectoris ([facility] [address] )     Ankle fracture, right     casted    Anxiety     Arthritis     Asthma     Cisneros esophagus     Bursitis     Constipation     Cystitis     chronic    Diverticulosis     Fibromyalgia     Fibromyalgia, primary     GERD (gastroesophageal reflux disease)     H/o Lyme disease     Hiatal hernia     History of Clostridium difficile infection     Hyperlipidemia     Hypertension     Hypothyroid     hypo    IBS (irritable bowel syndrome)     Labral tear of shoulder     left    Lichen sclerosus of female genitalia 2016    Murmur     Neck pain, chronic     gets steroid injections-none since late 2016    Neuralgia     Oral lichen planus     Peritonitis (HCC)     Spinal stenosis     Ulcer     Ulcer of gastric fundus     Wears glasses     Wears partial dentures     upper and lower     Past Surgical History:   Procedure Laterality Date    ABDOMINAL SURGERY      exploratory-removal of appendix    APPENDECTOMY      CARDIAC CATHETERIZATION      CARPAL TUNNEL RELEASE Bilateral     CHOLECYSTECTOMY      COLONOSCOPY      sigmoid diverticulosis,5mm rectal tubular adenoma5/06    DILATION AND CURETTAGE OF UTERUS      x3 miscarriages    FOOT SURGERY Right     5th toe-hammertoe repair    FOOT SURGERY      HIATAL HERNIA REPAIR      HYSTERECTOMY  01/09/2012    complete ; for bleeding,tubal ligation,vaginal prolapse and rectocele repair    OVARIAN CYST REMOVAL      MN COLONOSCOPY FLX DX W/COLLJ SPEC WHEN PFRMD N/A 10/23/2017    Procedure: EGD AND COLONOSCOPY;  Surgeon: Aicha Tristan MD;  Location: [facility];   Service: Gastroenterology    MN DAR Leung Left 11/13/2017    Procedure: SHOULDER ARTHROSCOPY WITH SUBACROMIAL DECOMPRESSION, ROTATOR CUFF REPAIR; Surgeon: Donte Hoskins MD;  [facility] MA[facility] OR;  Service: Orthopedics    ROTATOR CUFF REPAIR Right     TONSILLECTOMY       Social History     Substance and Sexual Activity   Alcohol Use No    Comment: stopped drinking 9 years ago     Social History     Substance and Sexual Activity   Drug Use No     Social History     Tobacco Use   Smoking Status Never Smoker   Smokeless Tobacco Never Used     Family History:   Family History   Problem Relation Age of Onset    Cancer Mother         colon    Ulcerative colitis Mother     Alzheimer's disease Father     Heart disease Sister         MI x4-angioplasty x4 stents    Colonic polyp Sister     Ulcerative colitis Sister     Asthma Sister     COPD Sister     Cancer Brother         brain tumor-age 6    Cancer Brother 72        prostate    Arthritis Family     Osteoarthritis Family     Crohn's disease Neg Hx     Liver cancer Neg Hx        Meds/Allergies     Allergies   Allergen Reactions    Omnipaque [Iohexol] Other (See Comments)     Shakes, "passed out"    Pneumovax 23 [Pneumococcal Vac Polyvalent] Hives    Iodine     Iv Dye  [Iodinated Diagnostic Agents] Confusion     Annotation - [id number]: shaking    Aspirin GI Intolerance and Nausea Only     Reaction Date: [id number]; Annotation - [id number]: 325mg causes bleeding  Cannot take a dose higher than 81mg-pt has a hx of ulcer    Codeine GI Intolerance     N/V    Flexeril [Cyclobenzaprine]      Unknown reaction per pt   Allergy was noted on physicians note    Latex Rash    Other Rash     Adhesive Tape-caused a rash       Current Outpatient Medications:     albuterol (ACCUNEB) 0 63 MG/3ML nebulizer solution, Take 1 ampule by nebulization every 6 (six) hours as needed for wheezing, Disp: , Rfl:     ammonium lactate (LAC-HYDRIN) 12 % lotion, Apply topically 2 (two) times a day as needed for dry skin, Disp: , Rfl:     aspirin 81 mg chewable tablet, Chew 81 mg every morning  , Disp: , Rfl:    atorvastatin (LIPITOR) 10 mg tablet, Take 10 mg by mouth daily , Disp: , Rfl:     cholecalciferol (VITAMIN D3) 1,000 units tablet, Take 1,000 Units by mouth daily, Disp: , Rfl:     clobetasol (TEMOVATE) 0 05 % cream, Apply topically 2 (two) times a day, Disp: , Rfl:     cyclobenzaprine (FLEXERIL) 10 mg tablet, Take 10 mg by mouth 2 (two) times a day, Disp: , Rfl:     docusate sodium (COLACE) 100 mg capsule, Take 100 mg by mouth daily, Disp: , Rfl:     donepezil (ARICEPT) 5 mg tablet, Take 5 mg by mouth daily, Disp: , Rfl: 5    famotidine (PEPCID) 40 MG tablet, Take 40 mg by mouth daily, Disp: , Rfl:     fluticasone-vilanterol (Breo Ellipta) 100-25 mcg/inh inhaler, Inhale 1 puff daily Rinse mouth after use , Disp: , Rfl:     iron polysaccharides (FERREX 150) 150 mg capsule, Take 150 mg by mouth daily  , Disp: , Rfl:     Lactobacillus (ACIDOPHILUS) 100 MG CAPS, Take by mouth daily, Disp: , Rfl:     Levothyroxine Sodium 100 MCG CAPS, Take 100 mcg by mouth daily , Disp: , Rfl:     meclizine (ANTIVERT) 12 5 MG tablet, Take by mouth, Disp: , Rfl:     melatonin 3 mg, Take 3 mg by mouth daily at bedtime as needed, Disp: , Rfl:     metoprolol succinate (TOPROL-XL) 25 mg 24 hr tablet, Take 1 tablet (25 mg total) by mouth daily, Disp: 90 tablet, Rfl: 3    Multiple Vitamins-Minerals (ALIVE WOMENS 50+ PO), Take by mouth, Disp: , Rfl:     multivitamin (THERAGRAN) TABS, Take 1 tablet by mouth every morning, Disp: , Rfl:     pantoprazole (PROTONIX) 20 mg tablet, Take 40 mg by mouth daily , Disp: , Rfl:     PARoxetine (PAXIL) 20 mg tablet, Take 20 mg by mouth daily , Disp: , Rfl:     Probiotic Product (PROBIOTIC-10 PO), Take by mouth daily, Disp: , Rfl:     triamcinolone (KENALOG) 0 025 % ointment, Apply topically 2 (two) times a day, Disp: , Rfl:     vitamin B-12 (VITAMIN B-12) 1,000 mcg tablet, Take 3,000 mcg by mouth daily, Disp: , Rfl:     albuterol (PROVENTIL HFA,VENTOLIN HFA) 90 mcg/act inhaler, Inhale 2 puffs every 6 (six) hours as needed for wheezing , Disp: , Rfl:     amoxicillin (AMOXIL) 500 mg capsule, Take 500 mg by mouth every 8 (eight) hours, Disp: , Rfl:     ondansetron (ZOFRAN) 4 mg tablet, Take 1 tablet (4 mg total) by mouth every 6 (six) hours for 12 days, Disp: 12 tablet, Rfl: 0    Vitals: Blood pressure 120/60, pulse 72, temperature 97 7 °F (36 5 °C), temperature source Temporal, height 5' (1 524 m), weight 70 8 kg (156 lb), SpO2 98 %, not currently breastfeeding  Body mass index is 30 47 kg/m²  Vitals:    09/30/20 1437   Weight: 70 8 kg (156 lb)     BP Readings from Last 3 Encounters:   09/30/20 120/60   08/02/20 152/70   01/14/20 120/82       Physical Exam   Constitutional: She is oriented to person, place, and time  She appears well-developed and well-nourished  No distress  HENT:   Head: Normocephalic and atraumatic  Eyes: Pupils are equal, round, and reactive to light  Neck: Neck supple  No JVD present  No tracheal deviation present  No thyromegaly present  Cardiovascular: Normal rate, regular rhythm, S1 normal, S2 normal and intact distal pulses  Exam reveals no gallop, no S3, no S4, no distant heart sounds and no friction rub  Murmur heard  Systolic (ejection) murmur is present with a grade of 2/6  Pulmonary/Chest: Effort normal and breath sounds normal  No respiratory distress  She has no wheezes  She has no rales  She exhibits no tenderness  Abdominal: Soft  Bowel sounds are normal  She exhibits no distension  There is no abdominal tenderness  Musculoskeletal:         General: No deformity or edema  Neurological: She is alert and oriented to person, place, and time  Skin: Skin is warm and dry  No rash noted  She is not diaphoretic  No pallor  Psychiatric: She has a normal mood and affect   Her behavior is normal  Judgment normal        Diagnostic Studies Review Cardio:    Stress Test: Nuclear stress test done in 2012 at by Dr Edison Rodríguez shows mild to moderate inferior lateral ischemia EF was normal  Catheter done in 2012 and 14 shows no evidence of obstructive coronary artery disease  Echocardiogram/FACUNDO: Echo Doppler done in 2011 shows EF 60%, trace MR, trace TR  Catheterization: Cardiac catheterization done in August 2014 shows mild nonobstructive 25-30% stenosis with normal LV systolic function  was no different than cardiac catheter patient done in 2012  Vascular imaging: Vascular study done in April 2015 shows no significant ICA disease  ECG Report: Twelve-lead EKG done 4/7/2017 shows normal sinus heart rate 76 bpm  No cigarette ST changes  Twelve lead EKG done 01/03/2019 shows normal sinus rhythm heart rate 62 beats per minute  No significant ST changes  No change from old EKG    Twelve lead EKG done 07/03/2019 65 beats per minute normal sinus rhythm no significant ST changes no change from old it is normal EKG  Twelve lead EKG 07/03/2019 shows sinus tachycardia heart rate 102 beats per minute as compared to previous EKGs patient's heart rate is faster  Twelve lead EKG 09/30/2020 shows normal sinus rhythm heart rate 68 beats per minute no significant change from old EKG          Lab Review   Lab Results   Component Value Date    WBC 7 92 08/02/2020    HGB 12 5 08/02/2020    HCT 41 7 08/02/2020    MCV 77 (L) 08/02/2020    RDW 18 1 (H) 08/02/2020     08/02/2020     BMP:  Lab Results   Component Value Date    SODIUM 137 08/02/2020    K 4 1 08/02/2020     08/02/2020    CO2 24 08/02/2020    ANIONGAP 13 4 12/28/2015    BUN 9 08/02/2020    CREATININE 0 74 08/02/2020    GLUCOSE 98 12/28/2015    GLUF 108 (H) 03/13/2018    CALCIUM 9 0 08/02/2020    EGFR 80 08/02/2020     LFT:  Lab Results   Component Value Date    AST 35 08/02/2020    ALT 30 08/02/2020    ALKPHOS 115 08/02/2020    TP 8 0 08/02/2020    ALB 4 0 08/02/2020      Lab Results   Component Value Date    RUE2FCKPYJGN 4 508 (H) 08/02/2020     Lipid Profile:   Lab Results   Component Value Date    CHOLESTEROL 170 03/13/2018    HDL 45 03/13/2018    LDLCALC 88 03/13/2018    TRIG 183 (H) 03/13/2018     Lab Results   Component Value Date    CHOLESTEROL 170 03/13/2018    CHOLESTEROL 150 07/07/2016     Lab Results   Component Value Date    CKTOTAL 77 10/06/2017     Last TSH 03/13/2008 was 1 6  Dr Ute Paulino MD [facility]      "This note has been constructed using a voice recognition system  Therefore there may be syntax, spelling, and/or grammatical errors   Please call if you have any questions  "

## 2020-09-30 ENCOUNTER — OFFICE VISIT (OUTPATIENT)
Dept: CARDIOLOGY CLINIC | Facility: CLINIC | Age: 75
End: 2020-09-30
Payer: MEDICARE

## 2020-09-30 VITALS
DIASTOLIC BLOOD PRESSURE: 60 MMHG | HEART RATE: 72 BPM | HEIGHT: 60 IN | WEIGHT: 156 LBS | OXYGEN SATURATION: 98 % | TEMPERATURE: 97.7 F | BODY MASS INDEX: 30.63 KG/M2 | SYSTOLIC BLOOD PRESSURE: 120 MMHG

## 2020-09-30 DIAGNOSIS — R00.0 TACHYCARDIA: ICD-10-CM

## 2020-09-30 DIAGNOSIS — E03.9 HYPOTHYROIDISM, UNSPECIFIED TYPE: ICD-10-CM

## 2020-09-30 DIAGNOSIS — E78.2 MIXED HYPERLIPIDEMIA: ICD-10-CM

## 2020-09-30 DIAGNOSIS — I10 BENIGN ESSENTIAL HYPERTENSION: ICD-10-CM

## 2020-09-30 DIAGNOSIS — I25.10 ARTERIOSCLEROTIC CORONARY ARTERY DISEASE: ICD-10-CM

## 2020-09-30 PROCEDURE — 99214 OFFICE O/P EST MOD 30 MIN: CPT | Performed by: INTERNAL MEDICINE

## 2020-09-30 PROCEDURE — 93000 ELECTROCARDIOGRAM COMPLETE: CPT | Performed by: INTERNAL MEDICINE

## 2020-11-02 ENCOUNTER — TELEPHONE (OUTPATIENT)
Dept: CARDIOLOGY CLINIC | Facility: CLINIC | Age: 75
End: 2020-11-02

## 2020-11-02 ENCOUNTER — HOSPITAL ENCOUNTER (OUTPATIENT)
Dept: NON INVASIVE DIAGNOSTICS | Facility: HOSPITAL | Age: 75
Discharge: HOME | End: 2020-11-02
Attending: INTERNAL MEDICINE
Payer: MEDICARE

## 2020-11-02 ENCOUNTER — TRANSCRIBE ORDERS (OUTPATIENT)
Dept: ADMINISTRATIVE | Facility: HOSPITAL | Age: 75
End: 2020-11-02

## 2020-11-02 DIAGNOSIS — I25.10 ARTERIOSCLEROTIC CORONARY ARTERY DISEASE: ICD-10-CM

## 2020-11-02 DIAGNOSIS — E03.9 HYPOTHYROIDISM, UNSPECIFIED TYPE: ICD-10-CM

## 2020-11-02 DIAGNOSIS — I10 BENIGN ESSENTIAL HYPERTENSION: ICD-10-CM

## 2020-11-02 DIAGNOSIS — R00.0 TACHYCARDIA: ICD-10-CM

## 2020-11-02 DIAGNOSIS — E78.2 MIXED HYPERLIPIDEMIA: ICD-10-CM

## 2020-11-02 PROCEDURE — 93306 TTE W/DOPPLER COMPLETE: CPT | Performed by: INTERNAL MEDICINE

## 2020-11-02 PROCEDURE — 93306 TTE W/DOPPLER COMPLETE: CPT

## 2021-01-15 ENCOUNTER — OFFICE VISIT (OUTPATIENT)
Dept: OBGYN CLINIC | Facility: CLINIC | Age: 76
End: 2021-01-15
Payer: MEDICARE

## 2021-01-15 ENCOUNTER — APPOINTMENT (OUTPATIENT)
Dept: RADIOLOGY | Facility: CLINIC | Age: 76
End: 2021-01-15
Payer: MEDICARE

## 2021-01-15 VITALS
HEIGHT: 60 IN | BODY MASS INDEX: 28.11 KG/M2 | SYSTOLIC BLOOD PRESSURE: 140 MMHG | DIASTOLIC BLOOD PRESSURE: 70 MMHG | WEIGHT: 143.2 LBS | HEART RATE: 64 BPM

## 2021-01-15 DIAGNOSIS — M25.551 PAIN IN RIGHT HIP: ICD-10-CM

## 2021-01-15 DIAGNOSIS — M70.61 GREATER TROCHANTERIC BURSITIS OF RIGHT HIP: Primary | ICD-10-CM

## 2021-01-15 DIAGNOSIS — M16.11 PRIMARY OSTEOARTHRITIS OF ONE HIP, RIGHT: ICD-10-CM

## 2021-01-15 PROCEDURE — 99213 OFFICE O/P EST LOW 20 MIN: CPT | Performed by: ORTHOPAEDIC SURGERY

## 2021-01-15 PROCEDURE — 73502 X-RAY EXAM HIP UNI 2-3 VIEWS: CPT

## 2021-01-15 NOTE — PROGRESS NOTES
Assessment/Plan:  1  Greater trochanteric bursitis of right hip     2  Primary osteoarthritis of one hip, right     3  Pain in right hip  XR hip/pelv 2-3 vws right if performed     Scribe Attestation    I,:  Jaye Kowalski PA-C am acting as a scribe while in the presence of the attending physician :       I,:  Lizzy Alexandra,  personally performed the services described in this documentation    as scribed in my presence :         Jojo Velasquez is a pleasant 58-year-old female presenting today for follow-up of recurrent right hip pain  After reviewing her updated images, history, and physical exam, we believe that she has having a recurrence of her greater trochanteric bursitis  Although she does have mild to moderate underlying right hip osteoarthritis, she does not seem to have any groin pain with activity or on exam   We discussed potential treatment options and would like to begin conservatively  We have prescribed her Voltaren gel to use up to 4 times a day on the affected area and referred her back to physical therapy for stretching modalities for her right hip  If these fail, we could consider a repeat cortisone injection  We would like to see her back in 6-8 weeks to review the efficacy of the aforementioned treatments  She expressed understanding all of her questions were addressed today      Subjective:  Right hip pain    Patient ID: Louise Roberts is a 67 y o  female  Jojo Velasquez is a very pleasant 58-year-old female known to our practice presenting today for follow-up approximately 2 years after her last appointment for recurrence of right hip pain  Two years ago, she was treated for greater trochanteric bursitis with Voltaren gel, physical therapy, and a greater trochanteric bursal injection  Recently, she has noted a return of right lateral hip pain  Review of Systems   Constitutional: Positive for activity change  HENT: Negative  Eyes: Negative  Respiratory: Negative      Cardiovascular: Negative  Gastrointestinal: Negative  Endocrine: Negative  Genitourinary: Negative  Musculoskeletal: Positive for arthralgias, gait problem, joint swelling and myalgias  Skin: Negative  Allergic/Immunologic: Negative  Hematological: Negative  Psychiatric/Behavioral: Negative            Past Medical History:   Diagnosis Date    Allergic rhinitis     Last Assessed:10/22/2014    Alzheimer disease ([facility] [address] )     [facility]is [facility]     Ankle fracture, right     casted    Anxiety     Arthritis     Asthma     Cisneros esophagus     Bursitis     Constipation     Cystitis     chronic    Diverticulosis     Fibromyalgia     Fibromyalgia, primary     GERD (gastroesophageal reflux disease)     H/o Lyme disease     Hiatal hernia     History of Clostridium difficile infection     Hyperlipidemia     Hypertension     Hypothyroid     hypo    IBS (irritable bowel syndrome)     Labral tear of shoulder     left    Lichen sclerosus of female genitalia 2016    Murmur     Neck pain, chronic     gets steroid injections-none since late 2016    Neuralgia     Oral lichen planus     Peritonitis (HCC)     Spinal stenosis     Ulcer     Ulcer of gastric fundus     Wears glasses     Wears partial dentures     upper and lower       Past Surgical History:   Procedure Laterality Date    ABDOMINAL SURGERY      exploratory-removal of appendix    APPENDECTOMY      CARDIAC CATHETERIZATION      CARPAL TUNNEL RELEASE Bilateral     CHOLECYSTECTOMY      COLONOSCOPY      sigmoid diverticulosis,5mm rectal tubular adenoma5/06    DILATION AND CURETTAGE OF UTERUS      x3 miscarriages    FOOT SURGERY Right     5th toe-hammertoe repair    FOOT SURGERY      HIATAL HERNIA REPAIR      HYSTERECTOMY  01/09/2012    complete ; for bleeding,tubal ligation,vaginal prolapse and rectocele repair    OVARIAN CYST REMOVAL      AL COLONOSCOPY FLX DX W/COLLJ SPEC WHEN PFRMD N/A 10/23/2017    Procedure: EGD AND COLONOSCOPY;  Surgeon: Brittany Garcia MD;  [facility];   Service: Gastroenterology    AL DAR Frederick Left 11/13/2017    Procedure: SHOULDER ARTHROSCOPY WITH SUBACROMIAL DECOMPRESSION, ROTATOR CUFF REPAIR;  Surgeon: Bryant Borden MD;  Location[facility] MAIN OR;  Service: Orthopedics    ROTATOR CUFF REPAIR Right     TONSILLECTOMY         Family History   Problem Relation Age of Onset    Cancer Mother         colon    Ulcerative colitis Mother     Alzheimer's disease Father     Heart disease Sister         MI x4-angioplasty x4 stents    Colonic polyp Sister     Ulcerative colitis Sister     Asthma Sister     COPD Sister     Cancer Brother         brain tumor-age 11    Cancer Brother 72        prostate    Arthritis Family     Osteoarthritis Family     Crohn's disease Neg Hx     Liver cancer Neg Hx        Social History     Occupational History    Not on file   Tobacco Use    Smoking status: Never Smoker    Smokeless tobacco: Never Used   Substance and Sexual Activity    Alcohol use: No     Comment: stopped drinking 9 years ago    Drug use: No    Sexual activity: Not on file         Current Outpatient Medications:     albuterol (ACCUNEB) 0 63 MG/3ML nebulizer solution, Take 1 ampule by nebulization every 6 (six) hours as needed for wheezing, Disp: , Rfl:     albuterol (PROVENTIL HFA,VENTOLIN HFA) 90 mcg/act inhaler, Inhale 2 puffs every 6 (six) hours as needed for wheezing , Disp: , Rfl:     ammonium lactate (LAC-HYDRIN) 12 % lotion, Apply topically 2 (two) times a day as needed for dry skin, Disp: , Rfl:     amoxicillin (AMOXIL) 500 mg capsule, Take 500 mg by mouth every 8 (eight) hours, Disp: , Rfl:     aspirin 81 mg chewable tablet, Chew 81 mg every morning  , Disp: , Rfl:     atorvastatin (LIPITOR) 10 mg tablet, Take 10 mg by mouth daily , Disp: , Rfl:     cholecalciferol (VITAMIN D3) 1,000 units tablet, Take 1,000 Units by mouth daily, Disp: , Rfl:   clobetasol (TEMOVATE) 0 05 % cream, Apply topically 2 (two) times a day, Disp: , Rfl:     cyclobenzaprine (FLEXERIL) 10 mg tablet, Take 10 mg by mouth 2 (two) times a day, Disp: , Rfl:     docusate sodium (COLACE) 100 mg capsule, Take 100 mg by mouth daily, Disp: , Rfl:     donepezil (ARICEPT) 5 mg tablet, Take 5 mg by mouth daily, Disp: , Rfl: 5    famotidine (PEPCID) 40 MG tablet, Take 40 mg by mouth daily, Disp: , Rfl:     fluticasone-vilanterol (Breo Ellipta) 100-25 mcg/inh inhaler, Inhale 1 puff daily Rinse mouth after use , Disp: , Rfl:     iron polysaccharides (FERREX 150) 150 mg capsule, Take 150 mg by mouth daily  , Disp: , Rfl:     Lactobacillus (ACIDOPHILUS) 100 MG CAPS, Take by mouth daily, Disp: , Rfl:     Levothyroxine Sodium 100 MCG CAPS, Take 100 mcg by mouth daily , Disp: , Rfl:     meclizine (ANTIVERT) 12 5 MG tablet, Take by mouth, Disp: , Rfl:     melatonin 3 mg, Take 3 mg by mouth daily at bedtime as needed, Disp: , Rfl:     metoprolol succinate (TOPROL-XL) 25 mg 24 hr tablet, Take 1 tablet (25 mg total) by mouth daily, Disp: 90 tablet, Rfl: 3    Multiple Vitamins-Minerals (ALIVE WOMENS 50+ PO), Take by mouth, Disp: , Rfl:     multivitamin (THERAGRAN) TABS, Take 1 tablet by mouth every morning, Disp: , Rfl:     ondansetron (ZOFRAN) 4 mg tablet, Take 1 tablet (4 mg total) by mouth every 6 (six) hours for 12 days, Disp: 12 tablet, Rfl: 0    pantoprazole (PROTONIX) 20 mg tablet, Take 40 mg by mouth daily , Disp: , Rfl:     PARoxetine (PAXIL) 20 mg tablet, Take 20 mg by mouth daily , Disp: , Rfl:     Probiotic Product (PROBIOTIC-10 PO), Take by mouth daily, Disp: , Rfl:     triamcinolone (KENALOG) 0 025 % ointment, Apply topically 2 (two) times a day, Disp: , Rfl:     vitamin B-12 (VITAMIN B-12) 1,000 mcg tablet, Take 3,000 mcg by mouth daily, Disp: , Rfl:     Allergies   Allergen Reactions    Omnipaque [Iohexol] Other (See Comments)     Shakes, "passed out"    Pneumovax 23 [Pneumococcal Vac Polyvalent] Hives    Iodine     Iv Dye  [Iodinated Diagnostic Agents] Confusion     Annotation - [id number]: shaking    Aspirin GI Intolerance and Nausea Only     Reaction Date: [id number]; Annotation - [id number]: 325mg causes bleeding  Cannot take a dose higher than 81mg-pt has a hx of ulcer    Codeine GI Intolerance     N/V    Flexeril [Cyclobenzaprine]      Unknown reaction per pt  Allergy was noted on physicians note    Latex Rash    Other Rash     Adhesive Tape-caused a rash       Objective:  Vitals:    01/15/21 1106   BP: 140/70   Pulse: 64       Body mass index is 27 97 kg/m²  Right Hip Exam     Tenderness   The patient is experiencing tenderness in the greater trochanter and lateral     Range of Motion   Abduction: normal   Adduction: normal   Extension: normal   Flexion: normal   External rotation: normal   Internal rotation: normal     Muscle Strength   Abduction: 5/5   Adduction: 5/5   Flexion: 5/5     Tests   LAZARUS: negative  Josh: positive    Other   Erythema: absent  Sensation: normal  Pulse: present    Comments:  Negative [place], log roll, impingement  No leg length discrepancy  Marked tenderness palpation over greater troch  Able to ambulate  Thigh and calf soft nontender  Unable to elicit any groin pain on exam            Physical Exam  Vitals signs and nursing note reviewed  Constitutional:       Appearance: She is well-developed  Comments: Body mass index is 27 97 kg/m²  HENT:      Head: Normocephalic and atraumatic  Right Ear: External ear normal       Left Ear: External ear normal    Eyes:      Extraocular Movements: Extraocular movements intact  Neck:      Musculoskeletal: Normal range of motion  Cardiovascular:      Rate and Rhythm: Normal rate  Pulmonary:      Effort: Pulmonary effort is normal    Abdominal:      Palpations: Abdomen is soft  Musculoskeletal:      Comments: See ortho exam   Skin:     General: Skin is warm and dry  Neurological:      Mental Status: She is alert and oriented to person, place, and time  Psychiatric:         Mood and Affect: Mood normal          Behavior: Behavior normal          Thought Content: Thought content normal          Judgment: Judgment normal        I have personally reviewed pertinent films in PACS of the weight-bearing x-rays taken today of her pelvis and right hip which again demonstrate mild to moderate degenerative changes with concentric joint space narrowing and osteophyte formation of the acetabulum    There is no evidence of fracture, dislocation, avascular necrosis, or lytic or blastic lesion

## 2021-01-20 ENCOUNTER — EVALUATION (OUTPATIENT)
Dept: PHYSICAL THERAPY | Facility: CLINIC | Age: 76
End: 2021-01-20
Payer: MEDICARE

## 2021-01-20 DIAGNOSIS — M70.61 GREATER TROCHANTERIC BURSITIS OF RIGHT HIP: ICD-10-CM

## 2021-01-20 DIAGNOSIS — M25.551 PAIN IN RIGHT HIP: Primary | ICD-10-CM

## 2021-01-20 DIAGNOSIS — M16.11 PRIMARY OSTEOARTHRITIS OF ONE HIP, RIGHT: ICD-10-CM

## 2021-01-20 PROCEDURE — 97161 PT EVAL LOW COMPLEX 20 MIN: CPT

## 2021-01-20 PROCEDURE — 97110 THERAPEUTIC EXERCISES: CPT

## 2021-01-20 NOTE — PROGRESS NOTES
PT Evaluation     Today's date: 2021  Patient name: Rob Caldwell  : 1945  MRN: [id number]  Referring provider: Radha Aguilera  Dx:   Encounter Diagnosis     ICD-10-CM    1  Pain in right hip  M25 551 Ambulatory referral to Physical Therapy   2  Greater trochanteric bursitis of right hip  M70 61 Ambulatory referral to Physical Therapy   3  Primary osteoarthritis of one hip, right  M16 11 Ambulatory referral to Physical Therapy       Start Time: 1010  Stop Time: 1055  Total time in clinic (min): 45 minutes    Assessment  Assessment details: Rob Caldwell is a 67y o  year old female reports to physical therapy with symptoms consistent with referring diagnosis of: right hip pain  Patient demonstrates limitations in R hip and lumbar spine ROM, strength, and functional mobility  Patient is limited in the following functional activities: performing sit to stands, ascending/descending stairs, standing and walking greater than 10 minutes, and sitting prolonged periods of time  FOTO score is 41% with a 51% prediction in function  Patient requires skilled physical therapy to restore prior level of function, address functional limitations, and progress towards independence with home exercise program  Patient was educated on HEP as noted on flow sheet, nature of R hip pain associated with lumbar spine pain, and importance of performing ice on R hip if pain persists throughout the day  Patient verbalized and demonstrated understanding of HEP and plan of care  Pain in right hip  (primary encounter diagnosis)  Plan: Ambulatory referral to Physical Therapy    Greater trochanteric bursitis of right hip  Plan: Ambulatory referral to Physical Therapy    Primary osteoarthritis of one hip, right  Plan: Ambulatory referral to Physical Therapy    Symptom irritability: low  Goals  STGs to be achieved in 4 weeks     -STG 1: Patient will be independent with HEP    -STG 2: Patient will have 0/10 R hip pain at rest    -STG 3:  Patient will perform sit to stand independently with no R hip pain  -STG 4: Patient will report 40% functional improvement  -STG 5: Patient will demonstrate 1/2 grade MMT improvement in R hip  -STG 6: Patient will be able to walk for greater than 15 minutes with no pain  -STG 7: Patient will vacuum and sweep with no R hip pain  LTGs to be achieved in 8 weeks  -LTG 1: Patient will demonstrate independence with maintenance program    -LTG 2: Patient will perform all functional activities with less than 2/10 R hip pain  -LTG 3: Patient will improve FOTO score by 10 points  -LTG 4: Patient will report 80% functional improvement  -LTG 5: Patient will be able to walk for greater than 30 minutes with no pain  -LTG 6: Patient will demonstrate 1 grade MMT improvement in R hip  Plan  Patient would benefit from: PT eval and skilled physical therapy  Planned modality interventions: TENS, cryotherapy, electrical stimulation/Russian stimulation, thermotherapy: hydrocollator packs, traction and ultrasound  Planned therapy interventions: manual therapy, massage, neuromuscular re-education, patient education, balance, strengthening, stretching, therapeutic activities, therapeutic exercise, therapeutic training, functional ROM exercises, flexibility, gait training, graded activity, graded exercise and home exercise program  Frequency: 2x week  Treatment plan discussed with: patient        Subjective Evaluation    History of Present Illness  Mechanism of injury: Patient reports increased R hip pain for several years "off and on " She has had PT in 2018 for her lumbar spine and R hip pain  She went to ED for her R hip pain  Functionally, patient notes pain with walking, vacuuming, sweeping her floor, ascending/descending stairs, sit to stands, and sleeping on R side  Patient has a chair lift at home for the stairs, but would like to avoid it at this time   Patient had x-ray performed on R hip, with signs of arthritis  Patient has a history of dementia  Pain  Current pain ratin  At best pain ratin  At worst pain rating: 10  Quality: sharp  Relieving factors: medications  Aggravating factors: standing, walking and stair climbing  Progression: no change    Social Support  Steps to enter house: yes  12  Stairs in house: yes   2  Lives in: multiple-level home  Lives with: spouse    Employment status: not working    Diagnostic Tests  X-ray: abnormal  Treatments  Previous treatment: physical therapy  Current treatment: medication  Patient Goals  Patient goals for therapy: decreased pain and independence with ADLs/IADLs          Objective     Palpation   Left   No palpable tenderness to the gluteus medius, piriformis and TFL  Right   Tenderness of the gluteus medius, piriformis and TFL  Tenderness     Left Hip   No tenderness in the greater trochanter  Right Hip   Tenderness in the greater trochanter  Neurological Testing     Sensation     Hip   Left Hip   Intact: light touch    Right Hip   Intact: light touch    Reflexes   Left   Patellar (L4): normal (2+)  Achilles (S1): normal (2+)    Right   Patellar (L4): normal (2+)  Achilles (S1): normal (2+)    Active Range of Motion   Left Hip   Normal active range of motion    Right Hip   Normal active range of motion    Passive Range of Motion   Left Hip   Normal passive range of motion    Right Hip   Normal passive range of motion    Strength/Myotome Testing     Left Hip   Planes of Motion   Flexion: 4+  Abduction: 4+  Adduction: 4+  External rotation: 4+  Internal rotation: 4+    Right Hip   Planes of Motion   Flexion: 4  Abduction: 4  Adduction: 4  External rotation: 4  Internal rotation: 4    Additional Strength Details  B knee and ankle MMT 4+/5 globally    Tests     Additional Tests Details  No special tests performed, as ortho note reviewed prior to initial evaluation       General Comments:      Hip Comments   Lumbar AROM    Flexion: 100%    Extension: 100%    R SB: 100%    L SB: 100%   R rotation: 100%   L rotation: 100%     Repeated motion testing    Repeated flexion in standing: NT    Repeated flexion in sitting: Decreased, better    Repeated extension in standing: increased, worse    Prone lying: NT    Prone on elbows: NT    Repeated R side gliding: NE    Repeated L side gliding: NE             Precautions: Standard, Hx LBP, Dementia      Manuals 1/20                                       Neuro Re-Ed        Seated clamshell        Seated hip add squeeze        Balance on foam                                        Ther Ex        Seated lumbar flexion        Lumbar flex rot stretch        Standing hip abd        Standing hip flex        TB side stepping        Sit to stand with mob belt        Step taps                        Ther Activity                        Gait Training                        Modalities

## 2021-01-22 ENCOUNTER — OFFICE VISIT (OUTPATIENT)
Dept: PHYSICAL THERAPY | Facility: CLINIC | Age: 76
End: 2021-01-22
Payer: MEDICARE

## 2021-01-22 DIAGNOSIS — M16.11 PRIMARY OSTEOARTHRITIS OF ONE HIP, RIGHT: ICD-10-CM

## 2021-01-22 DIAGNOSIS — M25.551 PAIN IN RIGHT HIP: ICD-10-CM

## 2021-01-22 DIAGNOSIS — M70.61 GREATER TROCHANTERIC BURSITIS OF RIGHT HIP: Primary | ICD-10-CM

## 2021-01-22 PROCEDURE — 97140 MANUAL THERAPY 1/> REGIONS: CPT

## 2021-01-22 PROCEDURE — 97110 THERAPEUTIC EXERCISES: CPT

## 2021-01-22 NOTE — PROGRESS NOTES
Daily Note     Today's date: 2021  Patient name: Massiel Fairchild  : 1945  MRN: [id number]  Referring provider: Murphy Vital  Dx:   Encounter Diagnosis     ICD-10-CM    1  Greater trochanteric bursitis of right hip  M70 61    2  Primary osteoarthritis of one hip, right  M16 11    3  Pain in right hip  M25 551        Start Time: 0845  Stop Time: 930  Total time in clinic (min): 45 minutes    Subjective: Patient reports 1/10 R hip pain today  She does not report difficulties with standing since her initial evaluation  Objective: See treatment diary below      Assessment: Tolerated treatment well  No pain noted post tx in R hip  Cued for hip abduction during sit to stand for stabilization  Able to perform with mobilization belt  Patient would benefit from continued PT      Plan: Continue per plan of care        Precautions: Standard, Hx LBP, Dementia      Manuals       ITB, HS stretch  5'      LAD  5'                      Neuro Re-Ed        Seated clamshell  20 GTB      Seated hip add squeeze  3s x 10      Balance on foam        Seated HS stretch  5x10s B                              Ther Ex        Seated lumbar flexion  10      Lumbar flex rot stretch        Standing hip abd  10 B      Standing hip flex  10 B      TB side stepping  4x15'      Sit to stand with mob belt  10      Step taps  20       LAQ  3s x 10              Ther Activity                        Gait Training                        Modalities

## 2021-01-26 ENCOUNTER — OFFICE VISIT (OUTPATIENT)
Dept: PHYSICAL THERAPY | Facility: CLINIC | Age: 76
End: 2021-01-26
Payer: MEDICARE

## 2021-01-26 DIAGNOSIS — M70.61 GREATER TROCHANTERIC BURSITIS OF RIGHT HIP: Primary | ICD-10-CM

## 2021-01-26 DIAGNOSIS — M25.551 PAIN IN RIGHT HIP: ICD-10-CM

## 2021-01-26 DIAGNOSIS — M16.11 PRIMARY OSTEOARTHRITIS OF ONE HIP, RIGHT: ICD-10-CM

## 2021-01-26 PROCEDURE — 97110 THERAPEUTIC EXERCISES: CPT

## 2021-01-26 NOTE — PROGRESS NOTES
Daily Note     Today's date: 2021  Patient name: Laci Zelaya  : 1945  MRN: [id number]  Referring provider: Neville Coy  Dx:   Encounter Diagnosis     ICD-10-CM    1  Greater trochanteric bursitis of right hip  M70 61    2  Primary osteoarthritis of one hip, right  M16 11    3  Pain in right hip  M25 551        Start Time: 1010  Stop Time: 1045  Total time in clinic (min): 35 minutes    Subjective: Patient reports 5/10 R hip pain today  She reports difficulties with performing stairs at home  Objective: See treatment diary below      Assessment: Tolerated treatment well  Patient's pain is abolished with flexion directional preference and strengthening of R hip musculature  Provided with abduciton during sit to stands to help improve strengthening  Patient required constant cueing for tempo and proper form  Patient would benefit from continued PT      Plan: Continue per plan of care        Precautions: Standard, Hx LBP, Dementia      Manuals      ITB, HS stretch  5'      LAD  5'                      Neuro Re-Ed        Seated clamshell  20 GTB 20 GTB     Seated hip add squeeze  3s x 10 3s x 10     Balance on foam        Seated HS stretch  5x10s B 5x10s B                             Ther Ex        Seated lumbar flexion  10 10     Lumbar flex rot stretch        Standing hip abd  10 B 10 B     Standing hip flex  10 B 10 B     Hip cirlces   10 B     TB side stepping  4x15' 4x15'     Sit to stand with mob belt  10      Step taps  20  20      LAQ  3s x 10 3sx10 B     Heel slides   3s x 10     Ther Activity                        Gait Training                        Modalities

## 2021-01-28 ENCOUNTER — OFFICE VISIT (OUTPATIENT)
Dept: PHYSICAL THERAPY | Facility: CLINIC | Age: 76
End: 2021-01-28
Payer: MEDICARE

## 2021-01-28 ENCOUNTER — OFFICE VISIT (OUTPATIENT)
Dept: OBGYN CLINIC | Facility: CLINIC | Age: 76
End: 2021-01-28
Payer: MEDICARE

## 2021-01-28 VITALS
DIASTOLIC BLOOD PRESSURE: 70 MMHG | BODY MASS INDEX: 28.07 KG/M2 | WEIGHT: 143 LBS | SYSTOLIC BLOOD PRESSURE: 120 MMHG | HEIGHT: 60 IN | HEART RATE: 70 BPM

## 2021-01-28 DIAGNOSIS — M25.551 PAIN IN RIGHT HIP: ICD-10-CM

## 2021-01-28 DIAGNOSIS — M70.61 GREATER TROCHANTERIC BURSITIS OF RIGHT HIP: Primary | ICD-10-CM

## 2021-01-28 DIAGNOSIS — G89.29 CHRONIC RIGHT SI JOINT PAIN: ICD-10-CM

## 2021-01-28 DIAGNOSIS — M16.11 PRIMARY OSTEOARTHRITIS OF ONE HIP, RIGHT: ICD-10-CM

## 2021-01-28 DIAGNOSIS — M53.3 CHRONIC RIGHT SI JOINT PAIN: ICD-10-CM

## 2021-01-28 PROCEDURE — 99214 OFFICE O/P EST MOD 30 MIN: CPT | Performed by: ORTHOPAEDIC SURGERY

## 2021-01-28 PROCEDURE — 97112 NEUROMUSCULAR REEDUCATION: CPT

## 2021-01-28 NOTE — PROGRESS NOTES
Assessment/Plan:  1  Greater trochanteric bursitis of right hip     2  Chronic right SI joint pain     3  Pain in right hip       Scribe Attestation    I,:  John Rebolledo am acting as a scribe while in the presence of the attending physician :       I,:  Daly Reese, DO personally performed the services described in this documentation    as scribed in my presence :         Nicole King is a pleasant 67year old who presents for a re-evaluation of her right hip  At this time it is hard to determine whether her pain is radicular from her right sacroiliac joint versus right greater troch bursitis  I will see her back 3 weeks after her last COVID vaccine on 3/17/2021 for a therapeutic versus diagnostic steroid injection of her right GTB  I did state that she should continue use of the Voltaren gel applying it 3 times a day  She may also use Tylenol 650 mg every 8 hours  Patient understood and had no further questions    Subjective: Right Hip Pain    Patient ID: Juan Carlos Decker is a 67 y o  female who presents today for re-evaluation of her right hip  She has been treated for greater trochanteric bursitis  She has been going to physical therapy  She states that physical therapy has exacerbated her pain  She states that her pain is constant in shooting and rates her pain as a 10/10  She states that her pain starts in her low back and radiates down her leg  She states that the return gel did not help alleviate her pain  Review of Systems   Constitutional: Positive for activity change  HENT: Negative  Eyes: Negative  Respiratory: Negative  Cardiovascular: Negative  Gastrointestinal: Negative  Endocrine: Negative  Genitourinary: Negative  Musculoskeletal: Positive for arthralgias and gait problem  Skin: Negative  Allergic/Immunologic: Negative  Hematological: Negative  Psychiatric/Behavioral: Negative            Past Medical History:   Diagnosis Date    Allergic rhinitis     Last Assessed:10/22/2014    [facility] [facility]     [facility]ina pectoris [facility] [facility]     Ankle fracture, right     casted    Anxiety     Arthritis     Asthma     Cisneros esophagus     Bursitis     Constipation     Cystitis     chronic    Diverticulosis     Fibromyalgia     Fibromyalgia, primary     GERD (gastroesophageal reflux disease)     H/o Lyme disease     Hiatal hernia     History of Clostridium difficile infection     Hyperlipidemia     Hypertension     Hypothyroid     hypo    IBS (irritable bowel syndrome)     Labral tear of shoulder     left    Lichen sclerosus of female genitalia 2016    Murmur     Neck pain, chronic     gets steroid injections-none since late 2016    Neuralgia     Oral lichen planus     Peritonitis (HCC)     Spinal stenosis     Ulcer     Ulcer of gastric fundus     Wears glasses     Wears partial dentures     upper and lower       Past Surgical History:   Procedure Laterality Date    ABDOMINAL SURGERY      exploratory-removal of appendix    APPENDECTOMY      CARDIAC CATHETERIZATION      CARPAL TUNNEL RELEASE Bilateral     CHOLECYSTECTOMY      COLONOSCOPY      sigmoid diverticulosis,5mm rectal tubular adenoma5/06    DILATION AND CURETTAGE OF UTERUS      x3 miscarriages    FOOT SURGERY Right     5th toe-hammertoe repair    FOOT SURGERY      HIATAL HERNIA REPAIR      HYSTERECTOMY  01/09/2012    complete ; for bleeding,tubal ligation,vaginal prolapse and rectocele repair    OVARIAN CYST REMOVAL      [place] COLONOSCOPY FLX DX W/COLLJ SPEC WHEN PFRMD N/A 10/23/2017    Procedure: EGD AND COLONOSCOPY;  Surgeon: Sybil Pool MD;  [facility];   Service: Gastroenterology    [facility] 11/[facility]/2017    Procedure: SHOULDER ARTHROSCOPY WITH SUBACROMIAL DECOMPRESSION, ROTATOR CUFF REPAIR;  Surgeon: Leila Combs MD;  Location: [facility] OR;  Service: Orthopedics    ROTATOR CUFF REPAIR Right     TONSILLECTOMY         Family History   Problem Relation Age of Onset    Cancer Mother         colon    Ulcerative colitis Mother     Alzheimer's disease Father     Heart disease Sister         MI x4-angioplasty x4 stents    Colonic polyp Sister     Ulcerative colitis Sister     Asthma Sister     COPD Sister    Normie Glory Cancer Brother         brain tumor-age 6    Cancer Brother 72        prostate    Arthritis Family     Osteoarthritis Family     Crohn's disease Neg Hx     Liver cancer Neg Hx        Social History     Occupational History    Not on file   Tobacco Use    Smoking status: Never Smoker    Smokeless tobacco: Never Used   Substance and Sexual Activity    Alcohol use: No     Comment: stopped drinking 9 years ago    Drug use: No    Sexual activity: Not on file         Current Outpatient Medications:     albuterol (ACCUNEB) 0 63 MG/3ML nebulizer solution, Take 1 ampule by nebulization every 6 (six) hours as needed for wheezing, Disp: , Rfl:     albuterol (PROVENTIL HFA,VENTOLIN HFA) 90 mcg/act inhaler, Inhale 2 puffs every 6 (six) hours as needed for wheezing , Disp: , Rfl:     ammonium lactate (LAC-HYDRIN) 12 % lotion, Apply topically 2 (two) times a day as needed for dry skin, Disp: , Rfl:     amoxicillin (AMOXIL) 500 mg capsule, Take 500 mg by mouth every 8 (eight) hours, Disp: , Rfl:     aspirin 81 mg chewable tablet, Chew 81 mg every morning  , Disp: , Rfl:     atorvastatin (LIPITOR) 10 mg tablet, Take 10 mg by mouth daily , Disp: , Rfl:     cholecalciferol (VITAMIN D3) 1,000 units tablet, Take 1,000 Units by mouth daily, Disp: , Rfl:     clobetasol (TEMOVATE) 0 05 % cream, Apply topically 2 (two) times a day, Disp: , Rfl:     cyclobenzaprine (FLEXERIL) 10 mg tablet, Take 10 mg by mouth 2 (two) times a day, Disp: , Rfl:     Diclofenac Sodium (VOLTAREN) 1 %, Apply 2 g topically 4 (four) times a day, Disp: 1 Tube, Rfl: 1    docusate sodium (COLACE) 100 mg capsule, Take 100 mg by mouth daily, Disp: , Rfl:    donepezil (ARICEPT) 5 mg tablet, Take 5 mg by mouth daily, Disp: , Rfl: 5    famotidine (PEPCID) 40 MG tablet, Take 40 mg by mouth daily, Disp: , Rfl:     fluticasone-vilanterol (Breo Ellipta) 100-25 mcg/inh inhaler, Inhale 1 puff daily Rinse mouth after use , Disp: , Rfl:     iron polysaccharides (FERREX 150) 150 mg capsule, Take 150 mg by mouth daily  , Disp: , Rfl:     Lactobacillus (ACIDOPHILUS) 100 MG CAPS, Take by mouth daily, Disp: , Rfl:     Levothyroxine Sodium 100 MCG CAPS, Take 100 mcg by mouth daily , Disp: , Rfl:     meclizine (ANTIVERT) 12 5 MG tablet, Take by mouth, Disp: , Rfl:     melatonin 3 mg, Take 3 mg by mouth daily at bedtime as needed, Disp: , Rfl:     metoprolol succinate (TOPROL-XL) 25 mg 24 hr tablet, Take 1 tablet (25 mg total) by mouth daily, Disp: 90 tablet, Rfl: 3    Multiple Vitamins-Minerals (ALIVE WOMENS 50+ PO), Take by mouth, Disp: , Rfl:     multivitamin (THERAGRAN) TABS, Take 1 tablet by mouth every morning, Disp: , Rfl:     ondansetron (ZOFRAN) 4 mg tablet, Take 1 tablet (4 mg total) by mouth every 6 (six) hours for 12 days, Disp: 12 tablet, Rfl: 0    pantoprazole (PROTONIX) 20 mg tablet, Take 40 mg by mouth daily , Disp: , Rfl:     PARoxetine (PAXIL) 20 mg tablet, Take 20 mg by mouth daily , Disp: , Rfl:     Probiotic Product (PROBIOTIC-10 PO), Take by mouth daily, Disp: , Rfl:     triamcinolone (KENALOG) 0 025 % ointment, Apply topically 2 (two) times a day, Disp: , Rfl:     vitamin B-12 (VITAMIN B-12) 1,000 mcg tablet, Take 3,000 mcg by mouth daily, Disp: , Rfl:     Allergies   Allergen Reactions    Omnipaque [Iohexol] Other (See Comments)     Shakes, "passed out"    Pneumovax 23 [Pneumococcal Vac Polyvalent] Hives    Iodine     Iv Dye  [Iodinated Diagnostic Agents] Confusion     Annotation - [id number]: shaking    Aspirin GI Intolerance and Nausea Only     Reaction Date: [id number];  Annotation - [id number]: 325mg causes bleeding  Cannot take a dose higher than 81mg-pt has a hx of ulcer    Codeine GI Intolerance     N/V    Flexeril [Cyclobenzaprine]      Unknown reaction per pt  Allergy was noted on physicians note    Latex Rash    Other Rash     Adhesive Tape-caused a rash       Objective:  Vitals:    01/28/21 1019   BP: 120/70   Pulse: 70       Body mass index is 27 93 kg/m²  Right Hip Exam     Tenderness   The patient is experiencing tenderness in the greater trochanter  Range of Motion   Abduction: normal   Adduction: normal   Extension: normal   Flexion: normal   External rotation: normal   Internal rotation: normal     Muscle Strength   Abduction: 5/5   Adduction: 5/5   Flexion: 5/5     Other   Sensation: normal  Pulse: present    Comments:  Lateral tenderness at ITB  TTP Right SI joint  Negative neuro SLR        Back Exam     Tenderness   The patient is experiencing tenderness in the sacroiliac  Tests   Straight leg raise right: negative    Other   Erythema: no back redness  Scars: absent            Physical Exam  Vitals signs and nursing note reviewed  Constitutional:       Appearance: She is well-developed  HENT:      Head: Normocephalic and atraumatic  Eyes:      General: No scleral icterus  Extraocular Movements: Extraocular movements intact  Conjunctiva/sclera: Conjunctivae normal    Neck:      Musculoskeletal: Normal range of motion and neck supple  Cardiovascular:      Rate and Rhythm: Normal rate  Pulmonary:      Effort: Pulmonary effort is normal  No respiratory distress  Musculoskeletal:      Comments: As noted in HPI   Skin:     General: Skin is warm and dry  Neurological:      Mental Status: She is alert and oriented to person, place, and time  Psychiatric:         Behavior: Behavior normal          I have personally reviewed pertinent films in PACS  No new images reviewed today

## 2021-01-28 NOTE — PROGRESS NOTES
Daily Note     Today's date: 2021  Patient name: Kelsey Edward  : 1945  MRN: [id number]  Referring provider: Kelsey Van  Dx:   Encounter Diagnosis     ICD-10-CM    1  Greater trochanteric bursitis of right hip  M70 61    2  Primary osteoarthritis of one hip, right  M16 11    3  Pain in right hip  M25 551        Start Time: 1000  Stop Time: 1010  Total time in clinic (min): 10 minutes    Subjective: Patient reports to PT with 10/10 R hip pain  She states that she left PT on Tuesday with no hip pain, but could not sleep the last two nights due to the pain  She did not want to cancel her appointment today because she wanted to be seen by PT and MD  Functionally, she complains of difficulties with rising from a seated position, sleeping through the night, and sitting for greater than 10 minutes at a time  Her niece was concerned about her pain, as her pain was not changed by Tylenol this morning  Objective: See treatment diary below      Assessment: Patient assessed for 10/10 R hip pain  Lumbar spine PROM and R hip PROM not limited, but complaints of increased pain noted  Long axis distraction assessed for potential reduction of pain  Increased pain noted  After testing and laying supine for 5 minutes, patient asked PT to see MD, as her pain continues to worsen despite various stretching and reduction of pain techniques  No changes in any pain levels despite functional movements or assessment  PT walked patient over to MD office to set up appointment regarding pain  Patient will follow up with MD today  Next PT appointment is on 2021  Plan: Continue per plan of care        Precautions: Standard, Hx LBP, Dementia      Manuals     ITB, HS stretch  5'      LAD  5'                      Neuro Re-Ed        Seated clamshell  20 GTB 20 GTB     Seated hip add squeeze  3s x 10 3s x 10     Balance on foam        Seated HS stretch  5x10s B 5x10s B             Assessment of lumbar spine and R hip pain, patient education    10'            Ther Ex        Seated lumbar flexion  10 10     Lumbar flex rot stretch        Standing hip abd  10 B 10 B     Standing hip flex  10 B 10 B     Hip cirlces   10 B     TB side stepping  4x15' 4x15'     Sit to stand with mob belt  10      Step taps  20  20      LAQ  3s x 10 3sx10 B     Heel slides   3s x 10     Ther Activity                        Gait Training                        Modalities

## 2021-01-29 ENCOUNTER — APPOINTMENT (OUTPATIENT)
Dept: PHYSICAL THERAPY | Facility: CLINIC | Age: 76
End: 2021-01-29
Payer: MEDICARE

## 2021-03-17 ENCOUNTER — OFFICE VISIT (OUTPATIENT)
Dept: OBGYN CLINIC | Facility: CLINIC | Age: 76
End: 2021-03-17
Payer: MEDICARE

## 2021-03-17 VITALS
BODY MASS INDEX: 26.9 KG/M2 | HEART RATE: 65 BPM | SYSTOLIC BLOOD PRESSURE: 122 MMHG | WEIGHT: 137 LBS | DIASTOLIC BLOOD PRESSURE: 80 MMHG | HEIGHT: 60 IN

## 2021-03-17 DIAGNOSIS — M70.61 GREATER TROCHANTERIC BURSITIS OF RIGHT HIP: Primary | ICD-10-CM

## 2021-03-17 DIAGNOSIS — G89.29 CHRONIC RIGHT SI JOINT PAIN: ICD-10-CM

## 2021-03-17 DIAGNOSIS — M53.3 CHRONIC RIGHT SI JOINT PAIN: ICD-10-CM

## 2021-03-17 DIAGNOSIS — M25.551 PAIN IN RIGHT HIP: ICD-10-CM

## 2021-03-17 PROCEDURE — 20610 DRAIN/INJ JOINT/BURSA W/O US: CPT | Performed by: ORTHOPAEDIC SURGERY

## 2021-03-17 PROCEDURE — 99213 OFFICE O/P EST LOW 20 MIN: CPT | Performed by: ORTHOPAEDIC SURGERY

## 2021-03-17 RX ORDER — BUPIVACAINE HYDROCHLORIDE 5 MG/ML
10 INJECTION, SOLUTION EPIDURAL; INTRACAUDAL
Status: COMPLETED | OUTPATIENT
Start: 2021-03-17 | End: 2021-03-17

## 2021-03-17 RX ORDER — TRIAMCINOLONE ACETONIDE 40 MG/ML
240 INJECTION, SUSPENSION INTRA-ARTICULAR; INTRAMUSCULAR
Status: COMPLETED | OUTPATIENT
Start: 2021-03-17 | End: 2021-03-17

## 2021-03-17 RX ADMIN — TRIAMCINOLONE ACETONIDE 240 MG: 40 INJECTION, SUSPENSION INTRA-ARTICULAR; INTRAMUSCULAR at 09:42

## 2021-03-17 RX ADMIN — BUPIVACAINE HYDROCHLORIDE 10 ML: 5 INJECTION, SOLUTION EPIDURAL; INTRACAUDAL at 09:42

## 2021-03-17 NOTE — PROGRESS NOTES
Assessment/Plan:  1  Greater trochanteric bursitis of right hip  Large joint arthrocentesis: R greater trochanteric bursa   2  Chronic right SI joint pain  Large joint arthrocentesis: R greater trochanteric bursa   3  Pain in right hip  Large joint arthrocentesis: R greater trochanteric bursa     Xiomara is a pleasant 48-year-old female presenting today for follow-up of her  Activity related right hip pain  Unfortunately, she has failed to see relief from conservative treatment and continues to be symptomatic of greater trochanteric bursitis and sacroiliitis on right  Currently, he trochanteric bursa seems to be the major pain generator  Therefore, after discussing risks and benefits she consented to and underwent a right hip greater trochanteric bursal cortisone injection as detailed below, which she tolerated well without difficulty or complication  Post injection instructions were provided  We have asked her to keep track of her response to the injection  If this fails to provide relief, we can consider referring her for a fluoroscopic guided right SI joint injection  Otherwise, she can return as needed  She expressed understanding all of her questions were addressed today    Large joint arthrocentesis: R greater trochanteric bursa  Universal Protocol:  Consent: Verbal consent obtained  Risks and benefits: risks, benefits and alternatives were discussed  Consent given by: patient  Timeout called at: 3/17/2021 9:38 AM   Site marked: the operative site was marked  Radiology Images displayed and confirmed   If images not available, report reviewed: imaging studies available  Patient identity confirmed: verbally with patient    Supporting Documentation  Indications: pain   Procedure Details  Location: hip - R greater trochanteric bursa  Needle size: 18 G  Ultrasound guidance: no  Approach: lateral  Medications administered: 10 mL bupivacaine (PF) 0 5 %; 240 mg triamcinolone acetonide 40 mg/mL    Patient tolerance: patient tolerated the procedure well with no immediate complications  Dressing:  Sterile dressing applied          Subjective: Right hip follow up    Patient ID: Barbara Harvey is a 67 y o  female  Valentin Mortimer is a pleasant 71-year-old female presenting today for follow-up of her right hip pain  She did attempt Tylenol, Voltaren gel, and home exercises after discharge from physical therapy to deal with her right hip pain, but she reports that it has persisted  It has been worse over the past few days  She locates it in the posterior and lateral aspect of the right hip  Of note, she does have some dementia and difficulty providing history  The pain can rate up to 10/10  Review of Systems   Constitutional: Positive for activity change  HENT: Negative  Eyes: Negative  Respiratory: Negative  Cardiovascular: Negative  Gastrointestinal: Negative  Endocrine: Negative  Genitourinary: Negative  Musculoskeletal: Positive for arthralgias, gait problem, joint swelling and myalgias  Skin: Negative  Allergic/Immunologic: Negative  Hematological: Negative  Psychiatric/Behavioral: Negative            Past Medical History:   Diagnosis Date    Allergic rhinitis     Last Assessed:10/22/2014    [facility] ([facility]     [facility] p[facility]is ([facility] [address] [facility]     Ankle fracture, right     casted    Anxiety     Arthritis     Asthma     Cisneros esophagus     Bursitis     Constipation     Cystitis     chronic    Diverticulosis     Fibromyalgia     Fibromyalgia, primary     GERD (gastroesophageal reflux disease)     H/o Lyme disease     Hiatal hernia     History of Clostridium difficile infection     Hyperlipidemia     Hypertension     Hypothyroid     hypo    IBS (irritable bowel syndrome)     Labral tear of shoulder     left    Lichen sclerosus of female genitalia 2016    Murmur     Neck pain, chronic     gets steroid injections-none since late 2016    Neuralgia     Oral lichen planus     Peritonitis (HCC)     Spinal stenosis     Ulcer     Ulcer of gastric fundus     Wears glasses     Wears partial dentures     upper and lower       Past Surgical History:   Procedure Laterality Date    ABDOMINAL SURGERY      exploratory-removal of appendix    APPENDECTOMY      CARDIAC CATHETERIZATION      CARPAL TUNNEL RELEASE Bilateral     CHOLECYSTECTOMY      COLONOSCOPY      sigmoid diverticulosis,5mm rectal tubular adenoma5/06    DILATION AND CURETTAGE OF UTERUS      x3 miscarriages    FOOT SURGERY Right     5th toe-hammertoe repair    FOOT SURGERY      HIATAL HERNIA REPAIR      HYSTERECTOMY  01/09/2012    complete ; for bleeding,tubal ligation,vaginal prolapse and rectocele repair    OVARIAN CYST REMOVAL      [place] COLONOSCOPY FLX DX W/COLLJ SPEC WHEN PFRMD N/A 10/23/2017    Procedure: EGD AND COLONOSCOPY;  Surgeon: Tressa Valdez MD;  [facility];   Service: Gastroenterology    NM SHLDR Bhargav Hdz Left 11/13/2017    Procedure: SHOULDER ARTHROSCOPY WITH SUBACROMIAL DECOMPRESSION, ROTATOR CUFF REPAIR;  Surgeon: Margarito Toussaint MD;  Location: [facility];  Service: Orthopedics    ROTATOR CUFF REPAIR Right     TONSILLECTOMY         Family History   Problem Relation Age of Onset    Cancer Mother         colon    Ulcerative colitis Mother     Alzheimer's disease Father     Heart disease Sister         MI x4-angioplasty x4 stents    Colonic polyp Sister     Ulcerative colitis Sister     Asthma Sister     COPD Sister     Cancer Brother         brain tumor-age 11    Cancer Brother 72        prostate    Arthritis Family     Osteoarthritis Family     Crohn's disease Neg Hx     Liver cancer Neg Hx        Social History     Occupational History    Not on file   Tobacco Use    Smoking status: Never Smoker    Smokeless tobacco: Never Used   Substance and Sexual Activity    Alcohol use: No     Comment: stopped drinking 9 years ago   Wil [place] Drug use: No    Sexual activity: Not on file         Current Outpatient Medications:     albuterol (ACCUNEB) 0 63 MG/3ML nebulizer solution, Take 1 ampule by nebulization every 6 (six) hours as needed for wheezing, Disp: , Rfl:     albuterol (PROVENTIL HFA,VENTOLIN HFA) 90 mcg/act inhaler, Inhale 2 puffs every 6 (six) hours as needed for wheezing , Disp: , Rfl:     ammonium lactate (LAC-HYDRIN) 12 % lotion, Apply topically 2 (two) times a day as needed for dry skin, Disp: , Rfl:     amoxicillin (AMOXIL) 500 mg capsule, Take 500 mg by mouth every 8 (eight) hours, Disp: , Rfl:     aspirin 81 mg chewable tablet, Chew 81 mg every morning  , Disp: , Rfl:     atorvastatin (LIPITOR) 10 mg tablet, Take 10 mg by mouth daily , Disp: , Rfl:     cholecalciferol (VITAMIN D3) 1,000 units tablet, Take 1,000 Units by mouth daily, Disp: , Rfl:     clobetasol (TEMOVATE) 0 05 % cream, Apply topically 2 (two) times a day, Disp: , Rfl:     cyclobenzaprine (FLEXERIL) 10 mg tablet, Take 10 mg by mouth 2 (two) times a day, Disp: , Rfl:     Diclofenac Sodium (VOLTAREN) 1 %, Apply 2 g topically 4 (four) times a day, Disp: 1 Tube, Rfl: 1    docusate sodium (COLACE) 100 mg capsule, Take 100 mg by mouth daily, Disp: , Rfl:     donepezil (ARICEPT) 5 mg tablet, Take 5 mg by mouth daily, Disp: , Rfl: 5    famotidine (PEPCID) 40 MG tablet, Take 40 mg by mouth daily, Disp: , Rfl:     fluticasone-vilanterol (Breo Ellipta) 100-25 mcg/inh inhaler, Inhale 1 puff daily Rinse mouth after use , Disp: , Rfl:     iron polysaccharides (FERREX 150) 150 mg capsule, Take 150 mg by mouth daily  , Disp: , Rfl:     Lactobacillus (ACIDOPHILUS) 100 MG CAPS, Take by mouth daily, Disp: , Rfl:     Levothyroxine Sodium 100 MCG CAPS, Take 100 mcg by mouth daily , Disp: , Rfl:     meclizine (ANTIVERT) 12 5 MG tablet, Take by mouth, Disp: , Rfl:     melatonin 3 mg, Take 3 mg by mouth daily at bedtime as needed, Disp: , Rfl:     metoprolol succinate (TOPROL-XL) 25 mg 24 hr tablet, Take 1 tablet (25 mg total) by mouth daily, Disp: 90 tablet, Rfl: 3    Multiple Vitamins-Minerals (ALIVE WOMENS 50+ PO), Take by mouth, Disp: , Rfl:     multivitamin (THERAGRAN) TABS, Take 1 tablet by mouth every morning, Disp: , Rfl:     ondansetron (ZOFRAN) 4 mg tablet, Take 1 tablet (4 mg total) by mouth every 6 (six) hours for 12 days, Disp: 12 tablet, Rfl: 0    pantoprazole (PROTONIX) 20 mg tablet, Take 40 mg by mouth daily , Disp: , Rfl:     PARoxetine (PAXIL) 20 mg tablet, Take 20 mg by mouth daily , Disp: , Rfl:     Probiotic Product (PROBIOTIC-10 PO), Take by mouth daily, Disp: , Rfl:     triamcinolone (KENALOG) 0 025 % ointment, Apply topically 2 (two) times a day, Disp: , Rfl:     vitamin B-12 (VITAMIN B-12) 1,000 mcg tablet, Take 3,000 mcg by mouth daily, Disp: , Rfl:     Allergies   Allergen Reactions    Omnipaque [Iohexol] Other (See Comments)     Shakes, "passed out"    Pneumovax 23 [Pneumococcal Vac Polyvalent] Hives    Iodine     Iv Dye  [Iodinated Diagnostic Agents] Confusion     Annotation - [id number]: shaking    Aspirin GI Intolerance and Nausea Only     Reaction Date: [id number]; Annotation - [id number]: 325mg causes bleeding  Cannot take a dose higher than 81mg-pt has a hx of ulcer    Codeine GI Intolerance     N/V    Flexeril [Cyclobenzaprine]      Unknown reaction per pt  Allergy was noted on physicians note    Latex Rash    Other Rash     Adhesive Tape-caused a rash       Objective:  Vitals:    03/17/21 0909   BP: 122/80   Pulse: 65       Body mass index is 26 76 kg/m²  Right Hip Exam     Tenderness   The patient is experiencing tenderness in the greater trochanter, lateral and posterior (SI joint)      Range of Motion   Abduction: normal   Adduction: normal   Extension: normal   Flexion: normal   External rotation: normal   Internal rotation: normal     Muscle Strength   Abduction: 5/5   Adduction: 5/5   Flexion: 5/5     Tests   LAZARUS: negative  Josh: positive    Other   Erythema: absent  Scars: absent  Sensation: normal  Pulse: present    Comments:  Negative [place], log roll, impingement  No leg length discrepancy  Marked tenderness palpation over greater troch and SI joint  Able to ambulate without assistive device  Thigh and calf soft nontender  Unable to elicit any groin pain on exam  Grossly NV unchanged            Physical Exam  Vitals signs and nursing note reviewed  Constitutional:       Appearance: She is well-developed  Comments: Body mass index is 26 76 kg/m²  HENT:      Head: Normocephalic and atraumatic  Right Ear: External ear normal       Left Ear: External ear normal    Neck:      Musculoskeletal: Normal range of motion  Cardiovascular:      Rate and Rhythm: Normal rate  Pulmonary:      Effort: Pulmonary effort is normal    Abdominal:      Palpations: Abdomen is soft  Musculoskeletal:      Comments: See ortho exam   Skin:     General: Skin is warm and dry  Neurological:      Mental Status: She is alert  Mental status is at baseline  Psychiatric:         Mood and Affect: Mood normal          Behavior: Behavior normal          Thought Content:  Thought content normal          Judgment: Judgment normal

## 2021-03-19 ENCOUNTER — OFFICE VISIT (OUTPATIENT)
Dept: CARDIOLOGY CLINIC | Facility: CLINIC | Age: 76
End: 2021-03-19
Payer: MEDICARE

## 2021-03-19 VITALS
BODY MASS INDEX: 27.48 KG/M2 | OXYGEN SATURATION: 97 % | HEART RATE: 63 BPM | TEMPERATURE: 97.5 F | DIASTOLIC BLOOD PRESSURE: 70 MMHG | HEIGHT: 60 IN | WEIGHT: 140 LBS | SYSTOLIC BLOOD PRESSURE: 130 MMHG

## 2021-03-19 DIAGNOSIS — I25.10 ARTERIOSCLEROTIC CORONARY ARTERY DISEASE: ICD-10-CM

## 2021-03-19 DIAGNOSIS — R07.89 ATYPICAL CHEST PAIN: ICD-10-CM

## 2021-03-19 DIAGNOSIS — F41.1 GENERALIZED ANXIETY DISORDER: ICD-10-CM

## 2021-03-19 DIAGNOSIS — E03.9 HYPOTHYROIDISM, UNSPECIFIED TYPE: ICD-10-CM

## 2021-03-19 DIAGNOSIS — J45.909 MILD ASTHMA WITHOUT COMPLICATION, UNSPECIFIED WHETHER PERSISTENT: ICD-10-CM

## 2021-03-19 DIAGNOSIS — I10 BENIGN ESSENTIAL HYPERTENSION: ICD-10-CM

## 2021-03-19 DIAGNOSIS — E78.2 MIXED HYPERLIPIDEMIA: ICD-10-CM

## 2021-03-19 PROCEDURE — 99214 OFFICE O/P EST MOD 30 MIN: CPT | Performed by: INTERNAL MEDICINE

## 2021-03-19 PROCEDURE — 93000 ELECTROCARDIOGRAM COMPLETE: CPT | Performed by: INTERNAL MEDICINE

## 2021-03-19 RX ORDER — ACETAMINOPHEN,DIPHENHYDRAMINE HCL 500; 25 MG/1; MG/1
1 TABLET, FILM COATED ORAL
COMMUNITY
End: 2022-05-02

## 2021-03-19 RX ORDER — QUETIAPINE FUMARATE 25 MG/1
25 TABLET, FILM COATED ORAL
COMMUNITY

## 2021-03-19 NOTE — PROGRESS NOTES
Progress Note - Cardiology Office  [facility]    Love Alexis 67 y o  female MRN: [id number]  : 1945  Encounter: [id number]      Assessment:     1  Benign essential hypertension    2  Arteriosclerotic coronary artery disease    3  Mixed hyperlipidemia    4  Atypical chest pain    5  Generalized anxiety disorder    6  Hypothyroidism, unspecified type    7  Mild asthma without complication, unspecified whether persistent        Discussion Summary and Plan:    1  Benign essential hypertension  Patient blood pressure is acceptable  She is on low-dose metoprolol is also helping with her tachycardia      2  Coronary artery disease  Status post cardiac catheterization twice in  and  with mild nonobstructive coronary artery disease  Continue medical Rx with aspirin statin  No symptoms of    3  Dyslipidemia  She has Lipitor 10 milligram   She had a blood test done 2020  LFTs were acceptable continue same Rx    4  Hypothyroidism  On Synthroid  She follows with her primary care doctor  TSH is slightly high 4 5 management as per medical team    5  History of DJD/back pain and fibromyalgia  Stable  Many of her medications were discontinued as she was getting memory loss  Currently she is doing    6  Anxiety  Patient was reassured that her symptoms of less likely to be cardiac in origin  7  Large hiatal hernia status post procedure by endoscopy for treatment not doing better  Follow up with GI  Number a tachycardia  Much better now  Please call [phone], if any questions  Counseling :  A description of the counseling  Goals and Barriers  Patient's ability to self care: Yes  Medication side effect reviewed with patient in detail and all their questions answered to their satisfaction  HPI :     Love Alexis is a 67y o  year old female who came for follow up   Patient has with a past medical history significant for mild nonobstructive coronary artery disease cardiac catheterization, dyslipidemia, back pain, asthma who came to see us for routine follow-up  Patient complains of some atypical chest pain which had one episode a few weeks ago with no relation to exercise  Patient is pretty active and walks every day with no symptoms of any chest pain or any shortness of breath  She has no fever no chills no nausea no vomiting no other significant complaint       09/30/2020  Above reviewed  Patient came for follow-up  She is having short-term memory loss which is not changed  She also has issues with wheezing with exertion  She saw a pulmonologist was told she had a COPD and was given inhalers  It helps her but she is not using it all the time  She has a cardiac catheter is done in 2012 and 2014 found to have mild nonobstructive coronary artery disease  Today her heart rate 68 beats per minute EKG shows no significant changes she is pretty compliant with her medications  She was in the emergency room on 08/02/2020 with nausea vomiting not feeling well COVID test was negative blood test at that time were acceptable  No other issues at this time  03/19/2021  Above reviewed  Patient came for follow-up she is doing well  She has not wheezing at this time  Asthma is under control  She has a catheterization done in 2012 and 14 found to have mild nonobstructive disease  Since then she has no symptoms of chest pain today heart rate 63 beats per minute EKG shows no significant change  She has decently active  Her weight has remained stable around 140 lb  No nausea no fever no chills no PND no orthopnea no other issues  She does get easily anxious and then her heart rate goes faster she is taking antianxiety medication  Review of Systems   Constitutional: Negative for activity change, chills, diaphoresis, fever and unexpected weight change  HENT: Negative for congestion  Eyes: Negative for discharge and redness     Respiratory: Negative for cough, chest tightness, shortness of breath and wheezing  Cardiovascular: Negative  Negative for chest pain, palpitations and leg swelling  Gastrointestinal: Negative for abdominal pain, diarrhea and nausea  Endocrine: Negative  Genitourinary: Negative for decreased urine volume and urgency  Musculoskeletal: Negative  Negative for arthralgias, back pain and gait problem  Skin: Negative for rash and wound  Allergic/Immunologic: Negative  Neurological: Negative for dizziness, seizures, syncope, weakness, light-headedness and headaches  Hematological: Negative  Psychiatric/Behavioral: Negative for agitation and confusion  The patient is nervous/anxious           Short term Memory loss       Historical Information   Past Medical History:   Diagnosis Date    Allergic rhinitis     Last Assessed:10/22/2014    Alzheimer disease ([facility] [address] [facility]     [facility]     Ankle fracture, right     casted    Anxiety     Arthritis     Asthma     Cisneros esophagus     Bursitis     Constipation     Cystitis     chronic    Diverticulosis     Fibromyalgia     Fibromyalgia, primary     GERD (gastroesophageal reflux disease)     H/o Lyme disease     Hiatal hernia     History of Clostridium difficile infection     Hyperlipidemia     Hypertension     Hypothyroid     hypo    IBS (irritable bowel syndrome)     Labral tear of shoulder     left    Lichen sclerosus of female genitalia 2016    Murmur     Neck pain, chronic     gets steroid injections-none since late 2016    Neuralgia     Oral lichen planus     Peritonitis (HCC)     Spinal stenosis     Ulcer     Ulcer of gastric fundus     Wears glasses     Wears partial dentures     upper and lower     Past Surgical History:   Procedure Laterality Date    ABDOMINAL SURGERY      exploratory-removal of appendix    APPENDECTOMY      CARDIAC CATHETERIZATION      CARPAL TUNNEL RELEASE Bilateral     CHOLECYSTECTOMY      COLONOSCOPY sigmoid diverticulosis,5mm rectal tubular adenoma5/06    DILATION AND CURETTAGE OF UTERUS      x3 miscarriages    FOOT SURGERY Right     5th toe-hammertoe repair    FOOT SURGERY      HIATAL HERNIA REPAIR      HYSTERECTOMY  01/09/2012    complete ; for bleeding,tubal ligation,vaginal prolapse and rectocele repair    OVARIAN CYST REMOVAL      RI COLONOSCOPY FLX DX W/COLLJ SPEC WHEN PFRMD N/A 10/23/2017    Procedure: EGD AND COLONOSCOPY;  Surgeon: Paz March MD;  Location: [facility]; Service: Gastroenterology    RI DAR Osman Left 11/13/2017    Procedure: SHOULDER ARTHROSCOPY WITH SUBACROMIAL DECOMPRESSION, ROTATOR CUFF REPAIR;  Surgeon: Lady Sakina MD;  Location: [facility] MAIN OR;  Service: Orthopedics    ROTATOR CUFF REPAIR Right     TONSILLECTOMY       Social History     Substance and Sexual Activity   Alcohol Use No    Comment: stopped drinking 9 years ago     Social History     Substance and Sexual Activity   Drug Use No     Social History     Tobacco Use   Smoking Status Never Smoker   Smokeless Tobacco Never Used     Family History:   Family History   Problem Relation Age of Onset    Cancer Mother         colon    Ulcerative colitis Mother     Alzheimer's disease Father     Heart disease Sister         MI x4-angioplasty x4 stents    Colonic polyp Sister     Ulcerative colitis Sister     Asthma Sister     COPD Sister     Cancer Brother         brain tumor-age 6    Cancer Brother 72        prostate    Arthritis Family     Osteoarthritis Family     Crohn's disease Neg Hx     Liver cancer Neg Hx        Meds/Allergies     Allergies   Allergen Reactions    Omnipaque [Iohexol] Other (See Comments)     Shakes, "passed out"    Pneumovax 23 [Pneumococcal Vac Polyvalent] Hives    Iodine     Iv Dye  [Iodinated Diagnostic Agents] Confusion     Annotation - [id number]: shaking    Aspirin GI Intolerance and Nausea Only     Reaction Date: [id number];  Annotation - [id number]: 325mg causes bleeding  Cannot take a dose higher than 81mg-pt has a hx of ulcer    Codeine GI Intolerance     N/V    Flexeril [Cyclobenzaprine]      Unknown reaction per pt   Allergy was noted on physicians note    Latex Rash    Other Rash     Adhesive Tape-caused a rash       Current Outpatient Medications:     albuterol (ACCUNEB) 0 63 MG/3ML nebulizer solution, Take 1 ampule by nebulization every 6 (six) hours as needed for wheezing, Disp: , Rfl:     albuterol (PROVENTIL HFA,VENTOLIN HFA) 90 mcg/act inhaler, Inhale 2 puffs every 6 (six) hours as needed for wheezing , Disp: , Rfl:     ammonium lactate (LAC-HYDRIN) 12 % lotion, Apply topically 2 (two) times a day as needed for dry skin, Disp: , Rfl:     aspirin 81 mg chewable tablet, Chew 81 mg every morning  , Disp: , Rfl:     atorvastatin (LIPITOR) 10 mg tablet, Take 10 mg by mouth daily , Disp: , Rfl:     cholecalciferol (VITAMIN D3) 1,000 units tablet, Take 1,000 Units by mouth daily, Disp: , Rfl:     clobetasol (TEMOVATE) 0 05 % cream, Apply topically 2 (two) times a day, Disp: , Rfl:     Diclofenac Sodium (VOLTAREN) 1 %, Apply 2 g topically 4 (four) times a day, Disp: 1 Tube, Rfl: 1    diphenhydrAMINE-acetaminophen (TYLENOL PM)  MG TABS, Take 1 tablet by mouth daily at bedtime as needed for sleep, Disp: , Rfl:     docusate sodium (COLACE) 100 mg capsule, Take 100 mg by mouth daily, Disp: , Rfl:     donepezil (ARICEPT) 5 mg tablet, Take 5 mg by mouth daily, Disp: , Rfl: 5    famotidine (PEPCID) 40 MG tablet, Take 40 mg by mouth daily, Disp: , Rfl:     fluticasone-vilanterol (Breo Ellipta) 100-25 mcg/inh inhaler, Inhale 1 puff daily Rinse mouth after use , Disp: , Rfl:     iron polysaccharides (FERREX 150) 150 mg capsule, Take 150 mg by mouth daily  , Disp: , Rfl:     Lactobacillus (ACIDOPHILUS) 100 MG CAPS, Take by mouth daily, Disp: , Rfl:     Levothyroxine Sodium 100 MCG CAPS, Take 100 mcg by mouth daily , Disp: , Rfl:    meclizine (ANTIVERT) 12 5 MG tablet, Take by mouth, Disp: , Rfl:     metoprolol succinate (TOPROL-XL) 25 mg 24 hr tablet, Take 1 tablet (25 mg total) by mouth daily, Disp: 90 tablet, Rfl: 3    Multiple Vitamins-Minerals (ALIVE WOMENS 50+ PO), Take by mouth, Disp: , Rfl:     multivitamin (THERAGRAN) TABS, Take 1 tablet by mouth every morning, Disp: , Rfl:     pantoprazole (PROTONIX) 20 mg tablet, Take 40 mg by mouth daily , Disp: , Rfl:     PARoxetine (PAXIL) 20 mg tablet, Take 20 mg by mouth daily , Disp: , Rfl:     Probiotic Product (PROBIOTIC-10 PO), Take by mouth daily, Disp: , Rfl:     QUEtiapine (SEROquel) 25 mg tablet, Take 25 mg by mouth daily at bedtime, Disp: , Rfl:     triamcinolone (KENALOG) 0 025 % ointment, Apply topically 2 (two) times a day, Disp: , Rfl:     amoxicillin (AMOXIL) 500 mg capsule, Take 500 mg by mouth every 8 (eight) hours, Disp: , Rfl:     cyclobenzaprine (FLEXERIL) 10 mg tablet, Take 10 mg by mouth 2 (two) times a day, Disp: , Rfl:     melatonin 3 mg, Take 3 mg by mouth daily at bedtime as needed, Disp: , Rfl:     ondansetron (ZOFRAN) 4 mg tablet, Take 1 tablet (4 mg total) by mouth every 6 (six) hours for 12 days, Disp: 12 tablet, Rfl: 0    vitamin B-12 (VITAMIN B-12) 1,000 mcg tablet, Take 3,000 mcg by mouth daily, Disp: , Rfl:     Vitals: Blood pressure 130/70, pulse 63, temperature 97 5 °F (36 4 °C), temperature source Temporal, height 5' (1 524 m), weight 63 5 kg (140 lb), SpO2 97 %, not currently breastfeeding  Body mass index is 27 34 kg/m²  Vitals:    03/19/21 1245   Weight: 63 5 kg (140 lb)     BP Readings from Last 3 Encounters:   03/19/21 130/70   03/17/21 122/80   01/28/21 120/70       Physical Exam   Constitutional: She is oriented to person, place, and time  She appears well-developed and well-nourished  No distress  HENT:   Head: Normocephalic and atraumatic  Eyes: Pupils are equal, round, and reactive to light  Neck: Neck supple  No JVD present  No tracheal deviation present  No thyromegaly present  Cardiovascular: Normal rate, regular rhythm, S1 normal, S2 normal and intact distal pulses  Exam reveals no gallop, no S3, no S4, no distant heart sounds and no friction rub  Murmur heard  Systolic (ejection) murmur is present with a grade of 2/6  Pulmonary/Chest: Effort normal  No respiratory distress  She has no wheezes  She has no rales  She exhibits no tenderness  Bilateral air entry no rhonchi no wheezing   Abdominal: Soft  Bowel sounds are normal  She exhibits no distension  There is no abdominal tenderness  Musculoskeletal:         General: No deformity or edema  Neurological: She is alert and oriented to person, place, and time  Skin: Skin is warm and dry  No rash noted  She is not diaphoretic  No pallor  Psychiatric: She has a normal mood and affect  Her behavior is normal  Judgment normal        Diagnostic Studies Review Cardio:    Stress Test: Nuclear stress test done in 2012 at by Dr Ronny Guzman shows mild to moderate inferior lateral ischemia EF was normal  Catheter done in 2012 and 14 shows no evidence of obstructive coronary artery disease  Echocardiogram/FACUNDO: Echo Doppler done in 2011 shows EF 60%, trace MR, trace TR  Echo Doppler done November 2020 shows normal LV systolic function mild LVH mild MR, trace TR PA pressure 30 mmHg  Catheterization: Cardiac catheterization done in August 2014 shows mild nonobstructive 25-30% stenosis with normal LV systolic function  was no different than cardiac catheter patient done in 2012  Vascular imaging: Vascular study done in April 2015 shows no significant ICA disease  ECG Report: Twelve-lead EKG done 4/7/2017 shows normal sinus heart rate 76 bpm  No cigarette ST changes  Twelve lead EKG done 01/03/2019 shows normal sinus rhythm heart rate 62 beats per minute  No significant ST changes    No change from old EKG    Twelve lead EKG done 07/03/2019 65 beats per minute normal sinus rhythm no significant ST changes no change from old it is normal EKG  Twelve lead EKG 07/03/2019 shows sinus tachycardia heart rate 102 beats per minute as compared to previous EKGs patient's heart rate is faster  Twelve lead EKG 09/30/2020 shows normal sinus rhythm heart rate 68 beats per minute no significant change from old EKG  Twelve lead EKG done 03/19/2021 shows normal sinus rhythm  Heart rate 63 beats per minute  Lab Review   Lab Results   Component Value Date    WBC 7 92 08/02/2020    HGB 12 5 08/02/2020    HCT 41 7 08/02/2020    MCV 77 (L) 08/02/2020    RDW 18 1 (H) 08/02/2020     08/02/2020     BMP:  Lab Results   Component Value Date    SODIUM 137 08/02/2020    K 4 1 08/02/2020     08/02/2020    CO2 24 08/02/2020    ANIONGAP 13 4 12/28/2015    BUN 9 08/02/2020    CREATININE 0 74 08/02/2020    GLUCOSE 98 12/28/2015    GLUF 108 (H) 03/13/2018    CALCIUM 9 0 08/02/2020    EGFR 80 08/02/2020     LFT:  Lab Results   Component Value Date    AST 35 08/02/2020    ALT 30 08/02/2020    ALKPHOS 115 08/02/2020    TP 8 0 08/02/2020    ALB 4 0 08/02/2020      Lab Results   Component Value Date    [id number] 4 508 (H) 08/02/2020     Lipid Profile:   Lab Results   Component Value Date    CHOLESTEROL 170 03/13/2018    HDL 45 03/13/2018    LDLCALC 88 03/13/2018    TRIG 183 (H) 03/13/2018     Lab Results   Component Value Date    CHOLESTEROL 170 03/13/2018    CHOLESTEROL 150 07/07/2016     Lab Results   Component Value Date    CKTOTAL 77 10/06/2017     Last TSH 03/13/2008 was 1 6  Dr Antonio Britton MD [facility]      "This note has been constructed using a voice recognition system  Therefore there may be syntax, spelling, and/or grammatical errors   Please call if you have any questions  "

## 2021-03-23 ENCOUNTER — TELEPHONE (OUTPATIENT)
Dept: NEUROLOGY | Facility: CLINIC | Age: 76
End: 2021-03-23

## 2021-04-02 ENCOUNTER — TELEPHONE (OUTPATIENT)
Dept: NEUROLOGY | Facility: CLINIC | Age: 76
End: 2021-04-02

## 2021-04-02 NOTE — TELEPHONE ENCOUNTER
THE [facility] for patient to [facility] and confirm appointment with Dr Shelbie Baldwin  Gave direct numbers in [employer] [place]

## 2021-04-07 ENCOUNTER — OFFICE VISIT (OUTPATIENT)
Dept: NEUROLOGY | Facility: CLINIC | Age: 76
End: 2021-04-07
Payer: MEDICARE

## 2021-04-07 VITALS
WEIGHT: 142 LBS | HEIGHT: 60 IN | SYSTOLIC BLOOD PRESSURE: 116 MMHG | HEART RATE: 68 BPM | BODY MASS INDEX: 27.88 KG/M2 | DIASTOLIC BLOOD PRESSURE: 76 MMHG

## 2021-04-07 DIAGNOSIS — R25.3 FASCICULATIONS: ICD-10-CM

## 2021-04-07 DIAGNOSIS — H93.19 TINNITUS, UNSPECIFIED LATERALITY: ICD-10-CM

## 2021-04-07 DIAGNOSIS — G30.8 ALZHEIMER'S DISEASE OF OTHER ONSET WITHOUT BEHAVIORAL DISTURBANCE: Primary | ICD-10-CM

## 2021-04-07 DIAGNOSIS — R44.1 VISUAL HALLUCINATIONS: ICD-10-CM

## 2021-04-07 DIAGNOSIS — G31.83 LEWY BODY DEMENTIA WITHOUT BEHAVIORAL DISTURBANCE (HCC): ICD-10-CM

## 2021-04-07 DIAGNOSIS — F02.80 LEWY BODY DEMENTIA WITHOUT BEHAVIORAL DISTURBANCE (HCC): ICD-10-CM

## 2021-04-07 DIAGNOSIS — R41.3 SHORT-TERM MEMORY LOSS: ICD-10-CM

## 2021-04-07 DIAGNOSIS — F02.80 ALZHEIMER'S DISEASE OF OTHER ONSET WITHOUT BEHAVIORAL DISTURBANCE: Primary | ICD-10-CM

## 2021-04-07 PROCEDURE — 99205 OFFICE O/P NEW HI 60 MIN: CPT | Performed by: PSYCHIATRY & NEUROLOGY
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
## 2021-04-12 ENCOUNTER — TRANSCRIBE ORDERS (OUTPATIENT)
Dept: ADMINISTRATIVE | Facility: HOSPITAL | Age: 76
End: 2021-04-12

## 2021-04-28 ENCOUNTER — HOSPITAL ENCOUNTER (OUTPATIENT)
Dept: NEUROLOGY | Facility: HOSPITAL | Age: 76
Discharge: HOME | End: 2021-04-28
Attending: PSYCHIATRY & NEUROLOGY
Payer: MEDICARE

## 2021-04-28 DIAGNOSIS — G31.83 LEWY BODY DEMENTIA WITHOUT BEHAVIORAL DISTURBANCE (HCC): ICD-10-CM

## 2021-04-28 DIAGNOSIS — F02.80 LEWY BODY DEMENTIA WITHOUT BEHAVIORAL DISTURBANCE (HCC): ICD-10-CM

## 2021-04-28 DIAGNOSIS — R41.3 SHORT-TERM MEMORY LOSS: ICD-10-CM

## 2021-04-28 PROCEDURE — 95813 EEG EXTND MNTR 61-119 MIN: CPT | Performed by: PSYCHIATRY & NEUROLOGY

## 2021-04-28 PROCEDURE — 95819 EEG AWAKE AND ASLEEP: CPT

## 2021-04-30 ENCOUNTER — HOSPITAL ENCOUNTER (OUTPATIENT)
Dept: MRI IMAGING | Facility: HOSPITAL | Age: 76
Discharge: HOME | End: 2021-04-30
Payer: MEDICARE

## 2021-04-30 DIAGNOSIS — R41.3 SHORT-TERM MEMORY LOSS: ICD-10-CM

## 2021-04-30 DIAGNOSIS — G31.83 LEWY BODY DEMENTIA WITHOUT BEHAVIORAL DISTURBANCE (HCC): ICD-10-CM

## 2021-04-30 DIAGNOSIS — R44.1 VISUAL HALLUCINATIONS: ICD-10-CM

## 2021-04-30 DIAGNOSIS — F02.80 LEWY BODY DEMENTIA WITHOUT BEHAVIORAL DISTURBANCE (HCC): ICD-10-CM

## 2021-04-30 PROCEDURE — G1004 CDSM NDSC: HCPCS

## 2021-04-30 PROCEDURE — 70553 MRI BRAIN STEM W/O & W/DYE: CPT

## 2021-04-30 PROCEDURE — A9585 GADOBUTROL INJECTION: HCPCS | Performed by: PSYCHIATRY & NEUROLOGY

## 2021-04-30 RX ADMIN — GADOBUTROL 7 ML: 604.72 INJECTION INTRAVENOUS at 13:55

## 2021-05-05 ENCOUNTER — OFFICE VISIT (OUTPATIENT)
Dept: AUDIOLOGY | Facility: CLINIC | Age: 76
End: 2021-05-05
Payer: MEDICARE

## 2021-05-05 ENCOUNTER — OFFICE VISIT (OUTPATIENT)
Dept: OTOLARYNGOLOGY | Facility: CLINIC | Age: 76
End: 2021-05-05
Payer: MEDICARE

## 2021-05-05 VITALS — BODY MASS INDEX: 28.47 KG/M2 | TEMPERATURE: 97.4 F | HEIGHT: 60 IN | WEIGHT: 145 LBS

## 2021-05-05 DIAGNOSIS — H90.3 SENSORINEURAL HEARING LOSS (SNHL), BILATERAL: ICD-10-CM

## 2021-05-05 DIAGNOSIS — J30.0 VASOMOTOR RHINITIS: ICD-10-CM

## 2021-05-05 DIAGNOSIS — H93.13 BILATERAL TINNITUS: Primary | ICD-10-CM

## 2021-05-05 DIAGNOSIS — H61.23 BILATERAL IMPACTED CERUMEN: ICD-10-CM

## 2021-05-05 DIAGNOSIS — H90.5 SENSORY HEARING LOSS: Primary | ICD-10-CM

## 2021-05-05 PROCEDURE — 92557 COMPREHENSIVE HEARING TEST: CPT | Performed by: AUDIOLOGIST

## 2021-05-05 PROCEDURE — 69210 REMOVE IMPACTED EAR WAX UNI: CPT | Performed by: NURSE PRACTITIONER

## 2021-05-05 PROCEDURE — 99204 OFFICE O/P NEW MOD 45 MIN: CPT | Performed by: NURSE PRACTITIONER

## 2021-05-05 PROCEDURE — 92567 TYMPANOMETRY: CPT | Performed by: AUDIOLOGIST

## 2021-05-05 RX ORDER — IPRATROPIUM BROMIDE 21 UG/1
2 SPRAY, METERED NASAL EVERY 12 HOURS
Qty: 30 ML | Refills: 6 | Status: SHIPPED | OUTPATIENT
Start: 2021-05-05 | End: 2022-05-04 | Stop reason: SDUPTHER

## 2021-05-05 RX ORDER — LANOLIN ALCOHOL/MO/W.PET/CERES
1 CREAM (GRAM) TOPICAL DAILY
COMMUNITY
Start: 2021-04-09

## 2021-05-05 NOTE — PROGRESS NOTES
Assessment/Plan:    Vasomotor rhinitis  Reviewed nature of rhinitis and symptoms associated including rhinorrhea  Recommend using saline irrigation and nasal steroids or Atrovent nasal spray on daily basis  Discussed surgical options if needed including Clarifix procedure  Reviewed actual cryotherapy procedure in detail including risks and benefits in the office setting  Agreed to use saline and Atrovent nasal spray daily and to consider Clarifix procedure  Bilateral tinnitus  Reviewed possible causes to tinnitus including SNHL and dementia  Discussed options for bilateral tinnitus including adding background noise, tinnitus retraining therapy, masking device, or acceptance  Limit caffeine and ibuprofen intake  Sensorineural hearing loss (SNHL), bilateral  Audiogram completed today indicated mild bilateral high pitch frequency SNHL with good word discrimination and tymps type A  Discussed options for bilateral tinnitus including adding background noise, tinnitus retraining therapy, masking device, or acceptance  Offered options for bilateral SNHL including acceptance, lifestyle changes such as moving closer to those who are speaking, or hearing amplification  She would qualify for hearing amplification based on audiogram   Discussed hearing aid options including facilities to obtain a hearing aid from as well as costs and benefits  Discussed that quality of life may improve with hearing amplification with tinnitus masking and she agreed to consider  Pt will follow up with our office annually          Bilateral impacted cerumen    On exam noted bilateral cerumen impaction and unable to fully view tympanic membrane  Cerumen impaction removed bilateral eac with alligator forceps and suction, pt tolerated procedure well  Upon removal, improved hearing and decreased clogged sensation of bilateral ears  Discussed routine cerumen care    Encourage ongoing follow up annually to monitor for cerumen and hearing  Diagnoses and all orders for this visit:    Bilateral tinnitus  -     Ambulatory referral to Audiology    Vasomotor rhinitis  -     ipratropium (ATROVENT) 0 03 % nasal spray; 2 sprays into each nostril every 12 (twelve) hours    Bilateral impacted cerumen  -     Ear cerumen removal    Sensorineural hearing loss (SNHL), bilateral          Subjective:      Patient ID: Ruel Velasco is a 67 y o  female  Presents today as a new patient due to tinnitus in both ears  High pitched ringing in both ears  More right vs left  No otalgia, no otorrhea  Hearing gradually worsening  No current hearing aids  Covid vaccine Jan/Feb   Denies tinnitus associated with vaccine  Frequent runny nose  History of dementia  Here today with samira        The following portions of the patient's history were reviewed and updated as appropriate: allergies, current medications, past family history, past medical history, past social history, past surgical history and problem list     Review of Systems   Constitutional: Negative  HENT: Positive for hearing loss and tinnitus  Negative for congestion, ear discharge, ear pain, nosebleeds, postnasal drip, rhinorrhea, sinus pressure, sinus pain, sore throat and voice change  Eyes: Negative  Respiratory: Negative for chest tightness and shortness of breath  Cardiovascular: Negative  Gastrointestinal: Negative  Endocrine: Negative  Musculoskeletal: Negative  Skin: Negative for color change  Neurological: Negative for dizziness, numbness and headaches  Psychiatric/Behavioral: Positive for decreased concentration  Objective:      Temp (!) 97 4 °F (36 3 °C) (Temporal)   Ht 5' (1 524 m)   Wt 65 8 kg (145 lb)   BMI 28 32 kg/m²          Physical Exam  Constitutional:       Appearance: She is well-developed  HENT:      Head: Normocephalic        Right Ear: Hearing, tympanic membrane, ear canal and external ear normal  No decreased hearing noted  No drainage or tenderness  There is impacted cerumen  Tympanic membrane is not perforated or erythematous  Left Ear: Hearing, tympanic membrane, ear canal and external ear normal  No decreased hearing noted  No drainage or tenderness  There is impacted cerumen  Tympanic membrane is not perforated or erythematous  Nose: Nose normal  No nasal deformity or septal deviation  Mouth/Throat:      Mouth: Mucous membranes are not pale and not dry  No oral lesions  Dentition: Normal dentition  Pharynx: Uvula midline  No oropharyngeal exudate  Neck:      Musculoskeletal: Full passive range of motion without pain, normal range of motion and neck supple  Trachea: No tracheal deviation  Cardiovascular:      Rate and Rhythm: Normal rate  Pulmonary:      Effort: Pulmonary effort is normal  No accessory muscle usage or respiratory distress  Musculoskeletal:      Right shoulder: She exhibits normal range of motion  Lymphadenopathy:      Cervical: No cervical adenopathy  Skin:     General: Skin is warm and dry  Neurological:      Mental Status: She is alert and oriented to person, place, and time  Cranial Nerves: No cranial nerve deficit  Sensory: No sensory deficit  Psychiatric:         Behavior: Behavior is cooperative  Ear cerumen removal    Date/Time: 5/5/2021 11:26 AM  Performed by: ROSIE Lim  Authorized by: ROSIE Lim   Universal Protocol:  Consent: Verbal consent obtained  Risks and benefits: risks, benefits and alternatives were discussed  Consent given by: patient  Patient understanding: patient states understanding of the procedure being performed      Patient location:  Clinic  Procedure details:     Local anesthetic:  None    Location:  L ear and R ear    Approach:  External  Post-procedure details:     Complication:  None    Hearing quality:  Normal    Patient tolerance of procedure:   Tolerated well, no immediate complications

## 2021-05-05 NOTE — PROGRESS NOTES
ENT HEARING EVALUATION    Name:  Joan Pastor  :  1945  Age:  67 y o  Date of Evaluation: 21     History: ENT Audiogram  Reason for visit: Joan Pastor is being seen today at the request of David Ramirez for an evaluation of hearing as part of their initial ENT visit  EVALUATION:    Otoscopic Evaluation:   Right Ear: Clear and healthy ear canal and tympanic membrane   Left Ear: Clear and healthy ear canal and tympanic membrane    Tympanometry:   Right: Type A - normal middle ear pressure and compliance   Left: Type A - normal middle ear pressure and compliance    Audiogram Results:  Pure tone testing revealed a normal sloping to mild sensorineural hearing loss bilaterally  SRT and PTA are in agreement indicating good test reliability  Word recognition scores were excellent bilaterally       *see attached audiogram      RECOMMENDATIONS:  Consult ENT      Kiya Rand , CCC-A  Clinical Audiologist

## 2021-05-06 PROBLEM — H61.23 BILATERAL IMPACTED CERUMEN: Status: ACTIVE | Noted: 2021-05-06

## 2021-05-06 PROBLEM — H90.3 SENSORINEURAL HEARING LOSS (SNHL), BILATERAL: Status: ACTIVE | Noted: 2021-05-06

## 2021-05-06 NOTE — ASSESSMENT & PLAN NOTE
Reviewed possible causes to tinnitus including SNHL and dementia  Discussed options for bilateral tinnitus including adding background noise, tinnitus retraining therapy, masking device, or acceptance  Limit caffeine and ibuprofen intake

## 2021-05-06 NOTE — ASSESSMENT & PLAN NOTE
Audiogram completed today indicated mild bilateral high pitch frequency SNHL with good word discrimination and tymps type A  Discussed options for bilateral tinnitus including adding background noise, tinnitus retraining therapy, masking device, or acceptance  Offered options for bilateral SNHL including acceptance, lifestyle changes such as moving closer to those who are speaking, or hearing amplification  She would qualify for hearing amplification based on audiogram   Discussed hearing aid options including facilities to obtain a hearing aid from as well as costs and benefits  Discussed that quality of life may improve with hearing amplification with tinnitus masking and she agreed to consider    Pt will follow up with our office annually

## 2021-05-06 NOTE — ASSESSMENT & PLAN NOTE
Reviewed nature of rhinitis and symptoms associated including rhinorrhea  Recommend using saline irrigation and nasal steroids or Atrovent nasal spray on daily basis  Discussed surgical options if needed including Clarifix procedure  Reviewed actual cryotherapy procedure in detail including risks and benefits in the office setting  Agreed to use saline and Atrovent nasal spray daily and to consider Clarifix procedure

## 2021-05-06 NOTE — ASSESSMENT & PLAN NOTE
On exam noted bilateral cerumen impaction and unable to fully view tympanic membrane  Cerumen impaction removed bilateral eac with alligator forceps and suction, pt tolerated procedure well  Upon removal, improved hearing and decreased clogged sensation of bilateral ears  Discussed routine cerumen care  Encourage ongoing follow up annually to monitor for cerumen and hearing

## 2021-05-11 ENCOUNTER — TELEPHONE (OUTPATIENT)
Dept: NEUROLOGY | Facility: CLINIC | Age: 76
End: 2021-05-11

## 2021-05-11 NOTE — TELEPHONE ENCOUNTER
----- Message from Praneeth Cooley, [address] sent at 5/10/2021  5:59 PM EDT -----  Please call the patient regarding her of no abnormal findings on her MRI

## 2021-05-12 ENCOUNTER — TRANSCRIBE ORDERS (OUTPATIENT)
Dept: ADMINISTRATIVE | Facility: HOSPITAL | Age: 76
End: 2021-05-12

## 2021-05-12 ENCOUNTER — DOCUMENTATION (OUTPATIENT)
Dept: INTERNAL MEDICINE CLINIC | Facility: CLINIC | Age: 76
End: 2021-05-12

## 2021-05-12 DIAGNOSIS — M47.12 OTHER SPONDYLOSIS WITH MYELOPATHY, CERVICAL REGION: Primary | ICD-10-CM

## 2021-06-09 ENCOUNTER — HOSPITAL ENCOUNTER (OUTPATIENT)
Dept: MRI IMAGING | Facility: HOSPITAL | Age: 76
Discharge: HOME | End: 2021-06-09
Payer: MEDICARE

## 2021-06-09 DIAGNOSIS — M47.12 OTHER SPONDYLOSIS WITH MYELOPATHY, CERVICAL REGION: ICD-10-CM

## 2021-06-09 PROCEDURE — G1004 CDSM NDSC: HCPCS

## 2021-06-09 PROCEDURE — 72141 MRI NECK SPINE W/O DYE: CPT

## 2021-08-05 NOTE — ED PROCEDURE NOTE
PROCEDURE  ECG 12 Lead Documentation  Date/Time: 10/6/2017 6:51 PM  Performed by: Gracia Anton  Authorized by: Gracia Anton     ECG reviewed by me, the ED Provider: yes    Patient location:  ED  Interpretation:     Interpretation: normal    Rate:     ECG rate:  117    ECG rate assessment: tachycardic    Rhythm:     Rhythm: sinus rhythm    Ectopy:     Ectopy: none    QRS:     QRS axis:  Normal    QRS intervals:  Normal  Conduction:     Conduction: normal    ST segments:     ST segments:  Normal  T waves:     T waves: normal prolene

## 2021-11-18 ENCOUNTER — OFFICE VISIT (OUTPATIENT)
Dept: CARDIOLOGY CLINIC | Facility: CLINIC | Age: 76
End: 2021-11-18
Payer: MEDICARE

## 2021-11-18 VITALS
HEIGHT: 60 IN | OXYGEN SATURATION: 97 % | WEIGHT: 153 LBS | TEMPERATURE: 97.4 F | HEART RATE: 66 BPM | DIASTOLIC BLOOD PRESSURE: 80 MMHG | SYSTOLIC BLOOD PRESSURE: 140 MMHG | BODY MASS INDEX: 30.04 KG/M2

## 2021-11-18 DIAGNOSIS — F41.1 GENERALIZED ANXIETY DISORDER: ICD-10-CM

## 2021-11-18 DIAGNOSIS — I10 BENIGN ESSENTIAL HYPERTENSION: ICD-10-CM

## 2021-11-18 DIAGNOSIS — I25.10 ARTERIOSCLEROTIC CORONARY ARTERY DISEASE: ICD-10-CM

## 2021-11-18 DIAGNOSIS — E03.9 HYPOTHYROIDISM, UNSPECIFIED TYPE: ICD-10-CM

## 2021-11-18 DIAGNOSIS — R00.0 TACHYCARDIA: ICD-10-CM

## 2021-11-18 DIAGNOSIS — E78.2 MIXED HYPERLIPIDEMIA: ICD-10-CM

## 2021-11-18 PROCEDURE — 99214 OFFICE O/P EST MOD 30 MIN: CPT | Performed by: INTERNAL MEDICINE

## 2021-11-18 PROCEDURE — 93000 ELECTROCARDIOGRAM COMPLETE: CPT | Performed by: INTERNAL MEDICINE

## 2022-03-14 ENCOUNTER — TELEPHONE (OUTPATIENT)
Dept: GASTROENTEROLOGY | Facility: CLINIC | Age: 77
End: 2022-03-14

## 2022-03-14 NOTE — TELEPHONE ENCOUNTER
[facility] Assessment    Name: Sujit French  YOB: 1945  Last Height: 5' (1 524 m)  Last weight: 69 4 kg (153 lb)  BMI: 29 88 kg/m²  Procedure: Egd  Diagnosis: GERD w/ esophagitis/Carcinoma in situ of esophagus  Date of procedure: 4/5/22  Prep:   Responsible : yes, niece  Phone#: [phone]  Name completing form: Stew Ruff  Date form completed: 03/14/22    If the patient answers yes to any of these questions, schedule in a hospital  Are you pregnant: No  Do you rely on a wheelchair for mobility: No  Have you been diagnosed with End Stage Renal Disease (ESRD): No  Do you need oxygen during the day: No  Have you had a heart attack or stroke within the past three months: No  Have you had a seizure within the past three months: No  Have you ever been informed by anesthesia that you have a difficult airway: No  Additional Questions  Have you had any cardiac testing or are under the care of a Cardiologist (see cardiac list): Yes (Comment: Obtain Cardiac Clearance)  Cardiac list:   Do you have an implanted cardiac defibrillator: No (Comment:  This patient should be scheduled in the hospital)    Have any bleeding problems, such as anemia or hemophilia (If patient has H&H result below 8, schedule in hospital   H&H must be within 30 days of procedure): No    Had an organ transplant within the past 3 months: No    Do you have any present infections: No  Do you get short of breath when walking a few blocks: No  Have you been diagnosed with diabetes: No  Comments (provide cardiac provider information if applicable): heart murmur/Dr Florida Orosco

## 2022-03-14 NOTE — TELEPHONE ENCOUNTER
Dr Grace Engle  Our mutual patient is scheduled for procedure: pt is scheduled for an EGD for hx of carcinoma in situ of esophagus and GERD w/ esophagitis  Please advise if pt is cleared for the procedure  Thank you so much! On: __4__/_ 5   _/_22    _      With: Dr Schaffer_He_______    He/She is taking the following blood thinner:   n/a       Can this be stopped ______ days prior to the procedure?       Physician Approving clearance: ________________________

## 2022-03-14 NOTE — TELEPHONE ENCOUNTER
Scheduled date of EGD(as of today):4/5/22  Physician performing EGD:Dr Beryl Laguna  Location of EGD:[facility]  Instructions reviewed with patient by:jorge  Clearances: Dr Farrah Antony murmur

## 2022-03-15 ENCOUNTER — TELEPHONE (OUTPATIENT)
Dept: CARDIOLOGY CLINIC | Facility: CLINIC | Age: 77
End: 2022-03-15

## 2022-03-15 NOTE — TELEPHONE ENCOUNTER
Pre  Op  Clearance note- Cardiology    Xiomara Marquez   67 y o   female  1945      Ibrahima Marquez :     Patient's chart was reviewed for preop clearance  Patient was seen in our office on 11/18/2021  Patient has past medical history significant for htn, CAD, mixed hyperlipidemia, and tachycardia  Patient is now scheduled for an EGD  Patient has no clinical evidence of  active heart failure or  active ischemia or active arrhythmia  Patient's last cardiac workup including echo and stress test reports were reviewed and it shows normal LV systolic function no significant valvular disease  In my opinion patient is in optimum condition for the procedure as planned  Patient is low risk for the surgery as planned from cardiac point of view  Continue current cardiac medications  Patient can hold  Aspirin for  5-7 days as required for surgery  Patient can hold Plavix for 5 days  Patient can hold Eliquis/Xarelto/Pradaxa for 3 days before the procedure  Please restart after the procedure  immediately or next day if no contraindication form surgical point of view and advise patient to contact our office  If you have any question please do not hesitate to call us at our office of [facility]    Phone # [phone]        Lab Results   Component Value Date    WBC 7 92 08/02/2020    HGB 12 5 08/02/2020    HCT 41 7 08/02/2020    MCV 77 (L) 08/02/2020     08/02/2020     Lab Results   Component Value Date    CREATININE 0 74 08/02/2020     Lab Results   Component Value Date    GLUF 108 (H) 03/13/2018       Cardiac testing:   Results for orders placed during the hospital encounter of 11/02/20    Echo complete with contrast if indicated    Narrative  [address]  [address] [address]  [phone]    Transthoracic Echocardiogram  2D, M-mode, Doppler, and Color Doppler    Study date:  02-Nov-2020    Patient: Jeanne Villavicencio MARYAM  MR number: [id number]  Account number: [de-identified]  : 1945  Age: 67 years  Gender: Female  Status: Outpatient  Location: Echo lab  Height: 60 in  Weight: 156 lb  BP: 120/ 60 mmHg    Indications: Hypertension    Diagnoses: I10  - Essential (primary) hypertension    Sonographer:  TRACI Pena  Primary Physician:  Chiara Silver MD  Referring Physician:  Rowena Tapia MD  Group:  [facility]  Interpreting Physician:  Rowena Tapia MD    SUMMARY    LEFT VENTRICLE:  Systolic function was normal  Ejection fraction was estimated in the range of 55 % to 60 % to be 60 %  There were no regional wall motion abnormalities  Wall thickness was mildly increased  There was mild borderline concentric hypertrophy  MITRAL VALVE:  There was mild annular calcification  There was mild regurgitation  AORTIC VALVE:  There was trace regurgitation  TRICUSPID VALVE:  There was mild regurgitation  Estimated peak PA pressure was 30 mmHg  HISTORY: PRIOR HISTORY: Asthma,CAD,GERD,HLD,HTN,Tachycardia,Chest pain,Murmur    PROCEDURE: The procedure was performed in the echo lab  This was a routine study  The transthoracic approach was used  The study included complete 2D imaging, M-mode, complete spectral Doppler, and color Doppler  The heart rate was 58 bpm,  at the start of the study  Echocardiographic views were limited due to lung interference  Image quality was adequate  LEFT VENTRICLE: Size was normal  Systolic function was normal  Ejection fraction was estimated in the range of 55 % to 60 % to be 60 %  There were no regional wall motion abnormalities  Wall thickness was mildly increased  There was mild  borderline concentric hypertrophy  DOPPLER: Left ventricular diastolic function parameters were normal for the patient's age      RIGHT VENTRICLE: The size was normal  Systolic function was normal     LEFT ATRIUM: Size was normal     RIGHT ATRIUM: Size was normal     MITRAL VALVE: There was mild annular calcification  There was normal leaflet separation  DOPPLER: The transmitral velocity was within the normal range  There was no evidence for stenosis  There was mild regurgitation  AORTIC VALVE: The valve was trileaflet  Leaflets exhibited mildly increased thickness, mild calcification, and normal cuspal separation  DOPPLER: Transaortic velocity was within the normal range  There was no evidence for stenosis  There  was trace regurgitation  TRICUSPID VALVE: DOPPLER: There was mild regurgitation  Estimated peak PA pressure was 30 mmHg  PULMONIC VALVE: DOPPLER: There was no significant regurgitation  PERICARDIUM: There was no thickening or calcification  There was no pericardial effusion  AORTA: The root exhibited normal size  SYSTEMIC VEINS: IVC: The inferior vena cava was normal in size  Respirophasic changes were normal     SYSTEM MEASUREMENT TABLES    2D  Ao Diam: 3 08 cm  LA Area: 12 98 cm2  LA Diam: 3 06 cm  LVEDV MOD A4C: 56 85 ml  LVEF MOD A4C: 60 35 %  LVESV MOD A4C: 22 54 ml  LVLd A4C: 7 08 cm  LVLs A4C: 5 78 cm  RA Area: 15 46 cm2  RVIDd: 3 44 cm  SV MOD A4C: 34 31 ml    PW  MV E/A Ratio: 1 28    [facility] Accredited [facility]chocardi[facility]    Prepared and electronically signed by    Hortencia Nguyen MD  Signed [id number] 10:13:24    No results found for this or any previous visit  No results found for this or any previous visit  No results found for this or any previous visit  No results found for this or any previous visit        Steve Matthews MD [facility]  3/15/2022  1:52 PM

## 2022-04-01 ENCOUNTER — TELEPHONE (OUTPATIENT)
Dept: GASTROENTEROLOGY | Facility: CLINIC | Age: 77
End: 2022-04-01

## 2022-04-01 ENCOUNTER — PREP FOR PROCEDURE (OUTPATIENT)
Dept: GASTROENTEROLOGY | Facility: CLINIC | Age: 77
End: 2022-04-01

## 2022-04-01 DIAGNOSIS — Z86.010 HISTORY OF COLON POLYPS: Primary | ICD-10-CM

## 2022-04-01 NOTE — TELEPHONE ENCOUNTER
Left message for patient that she was due for a colonoscopy in 2021 for hx of polyps with Dr Gunnar Gonsales  She is on for an EGD next week  I asked her to call if she wants added to procedure or we can extend to 5/17/23 which would be 5 years  Dr Gunnar Gonsales stated to repeat in 3-5 years

## 2022-04-01 NOTE — TELEPHONE ENCOUNTER
Added a colonoscopy to patient's EGD Tuesday   coming Monday morning to  instructions for colonoscopy prep  Please give him Miralax and Dulcolax instructions  Thank you!

## 2022-04-05 ENCOUNTER — HOSPITAL ENCOUNTER (OUTPATIENT)
Dept: GASTROENTEROLOGY | Facility: AMBULATORY SURGERY CENTER | Age: 77
Discharge: HOME | End: 2022-04-05
Payer: MEDICARE

## 2022-04-05 ENCOUNTER — ANESTHESIA (OUTPATIENT)
Dept: GASTROENTEROLOGY | Facility: AMBULATORY SURGERY CENTER | Age: 77
End: 2022-04-05

## 2022-04-05 ENCOUNTER — ANESTHESIA EVENT (OUTPATIENT)
Dept: GASTROENTEROLOGY | Facility: AMBULATORY SURGERY CENTER | Age: 77
End: 2022-04-05

## 2022-04-05 VITALS
HEIGHT: 60 IN | WEIGHT: 144 LBS | HEART RATE: 74 BPM | DIASTOLIC BLOOD PRESSURE: 76 MMHG | RESPIRATION RATE: 18 BRPM | TEMPERATURE: 97.5 F | OXYGEN SATURATION: 93 % | BODY MASS INDEX: 28.27 KG/M2 | SYSTOLIC BLOOD PRESSURE: 145 MMHG

## 2022-04-05 DIAGNOSIS — D00.1 CARCINOMA IN SITU OF ESOPHAGUS: ICD-10-CM

## 2022-04-05 DIAGNOSIS — Z86.010 HISTORY OF COLON POLYPS: ICD-10-CM

## 2022-04-05 DIAGNOSIS — K21.00 GASTROESOPHAGEAL REFLUX DISEASE WITH ESOPHAGITIS, UNSPECIFIED WHETHER HEMORRHAGE: ICD-10-CM

## 2022-04-05 PROCEDURE — 43239 EGD BIOPSY SINGLE/MULTIPLE: CPT | Performed by: INTERNAL MEDICINE

## 2022-04-05 PROCEDURE — 45385 COLONOSCOPY W/LESION REMOVAL: CPT | Performed by: INTERNAL MEDICINE

## 2022-04-05 PROCEDURE — 88305 TISSUE EXAM BY PATHOLOGIST: CPT | Performed by: PATHOLOGY

## 2022-04-05 PROCEDURE — 45380 COLONOSCOPY AND BIOPSY: CPT | Performed by: INTERNAL MEDICINE

## 2022-04-05 PROCEDURE — 99100 ANES PT EXTEME AGE<1 YR&>70: CPT | Performed by: NURSE ANESTHETIST, CERTIFIED REGISTERED

## 2022-04-05 PROCEDURE — 00813 ANES UPR LWR GI NDSC PX: CPT | Performed by: NURSE ANESTHETIST, CERTIFIED REGISTERED

## 2022-04-05 RX ORDER — PANTOPRAZOLE SODIUM 40 MG/1
40 TABLET, DELAYED RELEASE ORAL DAILY
Qty: 30 TABLET | Refills: 2 | Status: SHIPPED | OUTPATIENT
Start: 2022-04-05 | End: 2022-05-20 | Stop reason: SDUPTHER

## 2022-04-05 RX ORDER — PANTOPRAZOLE SODIUM 40 MG/1
40 TABLET, DELAYED RELEASE ORAL DAILY
Qty: 30 TABLET | Refills: 1 | Status: SHIPPED | OUTPATIENT
Start: 2022-04-05 | End: 2022-04-05 | Stop reason: SDUPTHER

## 2022-04-05 RX ORDER — SODIUM CHLORIDE 9 MG/ML
20 INJECTION, SOLUTION INTRAVENOUS CONTINUOUS
Status: DISCONTINUED | OUTPATIENT
Start: 2022-04-05 | End: 2022-04-09 | Stop reason: HOSPADM

## 2022-04-05 RX ORDER — LIDOCAINE HYDROCHLORIDE 10 MG/ML
INJECTION, SOLUTION EPIDURAL; INFILTRATION; INTRACAUDAL; PERINEURAL AS NEEDED
Status: DISCONTINUED | OUTPATIENT
Start: 2022-04-05 | End: 2022-04-05

## 2022-04-05 RX ORDER — PROPOFOL 10 MG/ML
INJECTION, EMULSION INTRAVENOUS AS NEEDED
Status: DISCONTINUED | OUTPATIENT
Start: 2022-04-05 | End: 2022-04-05

## 2022-04-05 RX ORDER — FAMOTIDINE 40 MG/1
40 TABLET, FILM COATED ORAL
Qty: 30 TABLET | Refills: 1 | Status: SHIPPED | OUTPATIENT
Start: 2022-04-05 | End: 2022-05-24

## 2022-04-05 RX ORDER — SODIUM CHLORIDE 9 MG/ML
30 INJECTION, SOLUTION INTRAVENOUS CONTINUOUS
Status: DISCONTINUED | OUTPATIENT
Start: 2022-04-05 | End: 2022-04-09 | Stop reason: HOSPADM

## 2022-04-05 RX ORDER — FAMOTIDINE 40 MG/1
40 TABLET, FILM COATED ORAL
Qty: 30 TABLET | Refills: 1 | Status: SHIPPED | OUTPATIENT
Start: 2022-04-05 | End: 2022-04-05 | Stop reason: SDUPTHER

## 2022-04-05 RX ORDER — SODIUM CHLORIDE 9 MG/ML
INJECTION, SOLUTION INTRAVENOUS CONTINUOUS PRN
Status: DISCONTINUED | OUTPATIENT
Start: 2022-04-05 | End: 2022-04-05

## 2022-04-05 RX ADMIN — PROPOFOL 50 MG: 10 INJECTION, EMULSION INTRAVENOUS at 10:51

## 2022-04-05 RX ADMIN — PROPOFOL 50 MG: 10 INJECTION, EMULSION INTRAVENOUS at 10:47

## 2022-04-05 RX ADMIN — PROPOFOL 50 MG: 10 INJECTION, EMULSION INTRAVENOUS at 10:41

## 2022-04-05 RX ADMIN — PROPOFOL 50 MG: 10 INJECTION, EMULSION INTRAVENOUS at 10:44

## 2022-04-05 RX ADMIN — SODIUM CHLORIDE: 9 INJECTION, SOLUTION INTRAVENOUS at 10:09

## 2022-04-05 RX ADMIN — LIDOCAINE HYDROCHLORIDE 30 MG: 10 INJECTION, SOLUTION EPIDURAL; INFILTRATION; INTRACAUDAL; PERINEURAL at 10:37

## 2022-04-05 RX ADMIN — PROPOFOL 50 MG: 10 INJECTION, EMULSION INTRAVENOUS at 10:55

## 2022-04-05 RX ADMIN — PROPOFOL 100 MG: 10 INJECTION, EMULSION INTRAVENOUS at 10:37

## 2022-04-05 NOTE — ANESTHESIA PREPROCEDURE EVALUATION
Procedure:  EGD  COLONOSCOPY    Relevant Problems   CARDIO   (+) Arteriosclerotic coronary artery disease   (+) Atypical chest pain   (+) Benign essential hypertension   (+) HLD (hyperlipidemia)      ENDO   (+) Hypothyroidism      GI/HEPATIC   (+) Hiatal hernia status post EsophyX TIF      MUSCULOSKELETAL   (+) Chronic right-sided low back pain without sciatica   (+) Primary osteoarthritis of one hip, right   (+) Sacroiliitis (HCC)      NEURO/PSYCH   (+) Anxiety disorder   (+) Chronic right-sided low back pain without sciatica      PULMONARY   (+) Asthma        Physical Exam    Airway    Mallampati score: II  TM Distance: >3 FB  Neck ROM: full     Dental   No notable dental hx     Cardiovascular  Rhythm: regular, Rate: normal, Cardiovascular exam normal    Pulmonary  Pulmonary exam normal Breath sounds clear to auscultation,     Other Findings        Anesthesia Plan  ASA Score- 2     Anesthesia Type- IV sedation with anesthesia with ASA Monitors  Additional Monitors:   Airway Plan:           Plan Factors-Exercise tolerance (METS): >4 METS  Chart reviewed  EKG reviewed  Imaging results reviewed  Existing labs reviewed  Patient summary reviewed  Obstructive sleep apnea risk education given perioperatively  Induction- intravenous  Postoperative Plan-     Informed Consent- Anesthetic plan and risks discussed with patient

## 2022-04-05 NOTE — H&P
History and Physical -  [facility]  Angella Gomez 67 y o  female MRN: [id number]                  HPI: Angella Gomez is a 67y o  year old female who presents for EGD and colonoscopy, history of chronic GERD, history of squamous cell dysplasia, status post RFA in the past, history of colon polyps      REVIEW OF SYSTEMS: Per the HPI, and otherwise unremarkable      Historical Information   Past Medical History:   Diagnosis Date    Allergic rhinitis     Last Assessed:10/22/2014    Alzheimer disease [facility]     [facility]is [facility]     Ankle fracture, right     casted    Anxiety     Arthritis     Asthma     Cisneros esophagus     Bursitis     Cervical herniated disc 04/04/2022    Colon polyp     Constipation     Constipation     COPD [facility]chronic obstructive pulmonary disease) [facility])     Coronary artery disease     Cystitis     chronic    Dementia ([facility] ) 04/04/2022    short term memory loss    Disease of thyroid gland     hypothyroid    Diverticulosis     Fasciculations     Fibromyalgia     Fibromyalgia, primary     GERD (gastroesophageal reflux disease)     H/o Lyme disease     Heart murmur     Hiatal hernia     History of Clostridium difficile infection     Hyper reflexia     Hyperlipidemia     Hypertension     on 4/4/22 pt denies having HTN    Hypothyroid     hypo    IBS (irritable bowel syndrome)     Labral tear of shoulder     left    Lichen sclerosus of female genitalia 2016    Murmur     Neck pain, chronic     gets steroid injections-none since late 2016    Neuralgia     Oral lichen planus     Peritonitis ([facility]     Spinal stenosis     Spinal stenosis     Tachycardia 04/04/2022    at times    Ulcer     Ulcer of gastric fundus     Vertigo     Wears glasses     Wears partial dentures     upper and lower     Past Surgical History:   Procedure Laterality Date    ABDOMINAL SURGERY      exploratory-removal of appendix    APPENDECTOMY      CARDIAC CATHETERIZATION      CARPAL TUNNEL RELEASE Bilateral     CHOLECYSTECTOMY      COLONOSCOPY      sigmoid diverticulosis,5mm rectal tubular adenoma5/06    DILATION AND CURETTAGE OF UTERUS      x3 miscarriages    FOOT SURGERY Right     5th toe-hammertoe repair    FOOT SURGERY      HIATAL HERNIA REPAIR      HYSTERECTOMY  01/09/2012    complete ; for bleeding,tubal ligation,vaginal prolapse and rectocele repair    OVARIAN CYST REMOVAL      AR COLONOSCOPY FLX DX W/COLLJ SPEC WHEN PFRMD N/A 10/23/2017    Procedure: EGD AND COLONOSCOPY;  Surgeon: Mundo Ngo MD;  Location: [facility];   Service: Gastroenterology    AR DAR [place] Left 11/13/2017    Procedure: SHOULDER ARTHROSCOPY WITH SUBACROMIAL DECOMPRESSION, ROTATOR CUFF REPAIR;  Surgeon: Aviva Harmon MD;  [facility] MAIN OR;  Service: Orthopedics    ROTATOR CUFF REPAIR Right     TONSILLECTOMY       Social History   Social History     Substance and Sexual Activity   Alcohol Use No    Comment: stopped drinking in 2012     Social History     Substance and Sexual Activity   Drug Use Never     Social History     Tobacco Use   Smoking Status Never Smoker   Smokeless Tobacco Never Used     Family History   Problem Relation Age of Onset    Cancer Mother         colon    Ulcerative colitis Mother     Alzheimer's disease Father     Heart disease Sister         MI x4-angioplasty x4 stents    Colonic polyp Sister     Ulcerative colitis Sister     Asthma Sister     COPD Sister     Cancer Brother         brain tumor-age 6    Cancer Brother 72        prostate    Arthritis Family     Osteoarthritis Family     Cancer Other         colon cancer    Crohn's disease Neg Hx     Liver cancer Neg Hx        Meds/Allergies     (Not in a hospital admission)      Allergies   Allergen Reactions    Omnipaque [Iohexol] Other (See Comments)     Shakes, "passed out"    Pneumovax 23 [Pneumococcal Vac Polyvalent] Hives    Iodine - Food Allergy     Iv Dye  [Iodinated Diagnostic Agents] Confusion     Annotation - [id number]: shaking    Aspirin GI Intolerance and Nausea Only     Reaction Date: [id number]; Annotation - [id number]: 325mg causes bleeding  Cannot take a dose higher than 81mg-pt has a hx of ulcer    Codeine GI Intolerance     N/V    Flexeril [Cyclobenzaprine]      Unknown reaction per pt  Allergy was noted on physicians note    Latex Rash    Other Rash     Adhesive Tape-caused a rash       Objective     /80   Pulse 94   Temp 97 5 °F (36 4 °C) (Skin)   Resp 18   Ht 5' (1 524 m)   Wt 65 3 kg (144 lb)   SpO2 95%   BMI 28 12 kg/m²       PHYSICAL EXAM    Gen: NAD  CV: RRR  CHEST: Clear  ABD: soft, NT/ND  EXT: no edema      ASSESSMENT/PLAN:  This is a 67y o  year old female here for EGD and colonoscopy, and she is stable and optimized for her procedure

## 2022-04-05 NOTE — ANESTHESIA POSTPROCEDURE EVALUATION
Post-Op Assessment Note    CV Status:  Stable  Pain Score: 0    Pain management: adequate     Mental Status:  Alert and awake   Hydration Status:  Euvolemic   PONV Controlled:  Controlled   Airway Patency:  Patent      Post Op Vitals Reviewed: Yes      Staff: CRNA   Comments: vss        No complications documented      BP   119/73   Temp      Pulse  78   Resp  15    SpO2   100
no

## 2022-04-05 NOTE — DISCHARGE INSTRUCTIONS
Famotidine (Acid Controller, Acid Reducer, Pepcid AC, Pepcid) - (By mouth)     Why this medicine is used:   Treats ulcers, gastroesophageal reflux disease (GERD), and conditions that cause the stomach to produce too much acid  Also treats heartburn caused by acid indigestion  Contact a nurse or doctor right away if you have:  · Blistering, peeling, red skin rash  · Fast, pounding, or uneven heartbeat, agitation, confusion  · Fainting, dizziness, lightheadedness, seizures  · Dark urine or pale stools, yellow eyes or skin  · Unusual bleeding, bruising, weakness  · Fever, chills, cough, sore throat, body aches     Common side effects:  · Constipation, diarrhea, upset stomach  · Headache, nausea, vomiting    © Copyright [employer] 2022 Information is for End User's use only and may not be sold, redistributed or otherwise used for commercial purposes  Pantoprazole (By mouth)   Pantoprazole (pan-TOE-pra-zole)  Treats gastroesophageal reflux disease (GERD), a damaged esophagus, and conditions that cause your stomach to make too much acid, including Zollinger-Roche syndrome  This medicine is a proton pump inhibitor (PPI)  Brand Name(s): Protonix   There may be other brand names for this medicine  When This Medicine Should Not Be Used: This medicine is not right for everyone  Do not use it if you had an allergic reaction to pantoprazole or similar medicines  How to Use This Medicine:   Packet, Tablet, Delayed Release Tablet, Long Acting Tablet  · Your doctor will tell you how much medicine to use  Do not use more than directed  · Delayed-release tablet: Swallow the tablet whole  Do not crush, break, or chew it  · Delayed-release packet: Take the medicine mixed in apple juice or applesauce at least 30 minutes before a meal  It may also be given using a nasogastric tube when mixed in apple juice only  ? To prepare with applesauce:   § Mix the packet contents with 1 teaspoon of applesauce   Do not mix with water or other liquids or food  Do not divide the packet contents to make smaller doses  § Swallow the mixture within 10 minutes after you mix it  Do not chew or crush the granules  § Sip some water after you take the mixture to make sure you swallow all of the medicine  ? To prepare with apple juice:   § Mix the packet contents with 1 teaspoon of apple juice in a small cup  Do not divide the packet contents to make smaller doses  § Stir for 5 seconds and drink the mixture immediately  Do not chew or crush the granules  § To make sure you get all of the medicine, add more apple juice to the cup  Drink it immediately  ? To prepare for a feeding tube:   § Pour the packet contents in a 2-ounce (60 milliliter [mL]) catheter-tip syringe  § Add 10 mL of apple juice to the syringe  Add the mixture to the tube  Gently tap or shake the barrel of the syringe to help empty it  § Add 10 mL of apple juice to the syringe and put it in the tube  Do this at least 2 times  There should be no granules left in the syringe  · This medicine should come with a Medication Guide  Ask your pharmacist for a copy if you do not have one  · Missed dose: Take a dose as soon as you remember  If it is almost time for your next dose, wait until then and take a regular dose  Do not take extra medicine to make up for a missed dose  · Store the medicine in a closed container at room temperature, away from heat, moisture, and direct light  Drugs and Foods to Avoid:   Ask your doctor or pharmacist before using any other medicine, including over-the-counter medicines, vitamins, and herbal products  · Do not use pantoprazole together with medicine that contains rilpivirine  · Some medicines can affect how pantoprazole works  Tell your doctor if you are using any of the following:   ?  Ampicillin, atazanavir, dasatinib, digoxin, erlotinib, itraconazole, ketoconazole, methotrexate, mycophenolate mofetil, nelfinavir, nilotinib, saquinavir  ? Blood thinner (including warfarin)  ? Diuretic (water pill)  ? Iron supplements  Warnings While Using This Medicine:   · Tell your doctor if you are pregnant or breastfeeding, or if you have kidney disease, liver disease, lupus, or osteoporosis  · This medicine may cause the following problems:  ? Kidney problems, including acute tubulointerstitial nephritis  ? Increased risk of broken bones in the hip, wrist, or spine (more likely if used several times per day or longer than 1 year)  ? Lupus  ? Fundic gland polyps (abnormal growth in the upper part of your stomach)  · This medicine can cause diarrhea  Call your doctor if the diarrhea becomes severe, does not stop, or is bloody  Do not take any medicine to stop diarrhea until you have talked to your doctor  Diarrhea can occur 2 months or more after you stop taking this medicine  · Tell any doctor or dentist who treats you that you are using this medicine  This medicine may affect certain medical test results  · Your doctor will do lab tests at regular visits to check on the effects of this medicine  Keep all appointments  · Keep all medicine out of the reach of children  Never share your medicine with anyone    Possible Side Effects While Using This Medicine:   Call your doctor right away if you notice any of these side effects:  · Allergic reaction: Itching or hives, swelling in your face or hands, swelling or tingling in your mouth or throat, chest tightness, trouble breathing  · Blistering, peeling, red skin rash  · Fever, joint pain, swelling in your body, unusual weight gain, change in how much or how often you urinate, blood in the urine  · Joint pain, rash on your cheeks or arms that gets worse in the sun  · Seizures, dizziness, uneven heartbeat, muscle cramps or twitching  · Severe diarrhea, stomach cramp or pain, nausea, vomiting, loss of appetite, weight loss  · Unusual tiredness or weakness  If you notice these less serious side effects, talk with your doctor:   · Headache  If you notice other side effects that you think are caused by this medicine, tell your doctor  Call your doctor for medical advice about side effects  You may report side effects to FDA at 3-461-RGG-3962    © Copyright [employer] 2022 Information is for End User's use only and may not be sold, redistributed or otherwise used for commercial purposes  The above information is an  only  It is not intended as medical advice for individual conditions or treatments  Talk to your doctor, nurse or pharmacist before following any medical regimen to see if it is safe and effective for you  High Fiber Diet   WHAT YOU NEED TO KNOW:   What is a high-fiber diet? A high-fiber diet includes foods that have a high amount of fiber  Fiber is the part of fruits, vegetables, and grains that is not broken down by your body  Fiber keeps your bowel movements regular  Fiber can also help lower your cholesterol level, control blood sugar in people with diabetes, and relieve constipation  Fiber can also help you control your weight because it helps you feel full faster  Most adults should eat 25 to 35 grams of fiber each day  Talk to your dietitian or healthcare provider about the amount of fiber you need  What foods are good sources of fiber? · Foods with at least 4 grams of fiber per serving:      ? ? to ½ cup of high-fiber cereal (check the nutrition label on the box)    ? ½ cup of blackberries or raspberries    ? 4 dried prunes    ? 1 cooked artichoke    ? ½ cup of cooked legumes, such as lentils, or red, kidney, and sainz beans    · Foods with 1 to 3 grams of fiber per serving:      ? 1 slice of whole-wheat, pumpernickel, or rye bread    ? ½ cup of cooked brown rice    ? 4 whole-wheat crackers    ? 1 cup of oatmeal    ? ½ cup of cereal with 1 to 3 grams of fiber per serving (check the nutrition label on the box)    ?  1 small piece of fruit, such as an apple, banana, pear, kiwi, or orange    ? 3 dates    ? ½ cup of canned apricots, fruit cocktail, peaches, or pears    ? ½ cup of raw or cooked vegetables, such as carrots, cauliflower, cabbage, spinach, squash, or corn    What are some ways that I can increase fiber in my diet? · Choose brown or wild rice instead of white rice  · Use whole wheat flour in recipes instead of white or all-purpose flour  · Add beans and peas to casseroles or soups  · Choose fresh fruit and vegetables with peels or skins on instead of juices  What other guidelines should I follow? · Add fiber to your diet slowly  You may have abdominal discomfort, bloating, and gas if you add fiber to your diet too quickly  · Drink plenty of liquids as you add fiber to your diet  You may have nausea or develop constipation if you do not drink enough water  Ask how much liquid to drink each day and which liquids are best for you  CARE AGREEMENT:   You have the right to help plan your care  Discuss treatment options with your healthcare provider to decide what care you want to receive  You always have the right to refuse treatment  The above information is an  only  It is not intended as medical advice for individual conditions or treatments  Talk to your doctor, nurse or pharmacist before following any medical regimen to see if it is safe and effective for you  © Copyright [employer] 2022 Information is for End User's use only and may not be sold, redistributed or otherwise used for commercial purposes  All illustrations and images included in CareNotes® are the copyrighted property of [employer]  or Myra Olivera  Hemorrhoids   WHAT YOU NEED TO KNOW:   What are hemorrhoids? Hemorrhoids are swollen blood vessels inside your rectum (internal hemorrhoids) or on your anus (external hemorrhoids)  Sometimes a hemorrhoid may prolapse  This means it extends out of your anus  What increases my risk for hemorrhoids? · Pregnancy or obesity    · Straining or sitting for a long time during bowel movements    · Liver disease    · Weak muscles around the anus caused by older age, rectal surgery, or anal intercourse    · A lack of physical activity    · Chronic diarrhea or constipation    · A low-fiber diet    What are the signs and symptoms of hemorrhoids? · Pain or itching around your anus or inside your rectum    · Swelling or bumps around your anus    · Bright red blood in your bowel movement, on the toilet paper, or in the toilet bowl    · Tissue bulging out of your anus (prolapsed hemorrhoids)    · Incontinence (poor control over urine or bowel movements)    How are hemorrhoids diagnosed? Your healthcare provider will ask about your symptoms, the foods you eat, and your bowel movements  He or she will examine your anus for external hemorrhoids  You may need the following:  · A digital rectal exam  is a test to check for hemorrhoids  Your healthcare provider will put a gloved finger inside your anus to feel for the hemorrhoids  · An anoscopy  is a test that uses a scope (small tube with a light and camera on the end) to look at your hemorrhoids  How are hemorrhoids treated? Treatment will depend on your symptoms  You may need any of the following:  · Medicines  can help decrease pain and swelling, and soften your bowel movement  The medicine may be a pill, pad, cream, or ointment  · Procedures  may be used to shrink or remove your hemorrhoid  Examples include rubber-band ligation, sclerotherapy, and photocoagulation  These procedures may be done in your healthcare provider's office  Ask your healthcare provider for more information about these procedures  · Surgery  may be needed to shrink or remove your hemorrhoids  How can I manage my symptoms? · Apply ice on your anus for 15 to 20 minutes every hour or as directed  Use an ice pack, or put crushed ice in a plastic bag   Cover it with a towel before you apply it to your anus  Ice helps prevent tissue damage and decreases swelling and pain  · Take a sitz bath  Fill a bathtub with 4 to 6 inches of warm water  You may also use a sitz bath pan that fits inside a toilet bowl  Sit in the sitz bath for 15 minutes  Do this 3 times a day, and after each bowel movement  The warm water can help decrease pain and swelling  · Keep your anal area clean  Gently wash the area with warm water daily  Soap may irritate the area  After a bowel movement, wipe with moist towelettes or wet toilet paper  Dry toilet paper can irritate the area  How can I help prevent hemorrhoids? · Do not strain to have a bowel movement  Do not sit on the toilet too long  These actions can increase pressure on the tissues in your rectum and anus  · Drink plenty of liquids  Liquids can help prevent constipation  Ask how much liquid to drink each day and which liquids are best for you  · Eat a variety of high-fiber foods  Examples include fruits, vegetables, and whole grains  Ask your healthcare provider how much fiber you need each day  You may need to take a fiber supplement  · Exercise as directed  Exercise, such as walking, may make it easier to have a bowel movement  Ask your healthcare provider to help you create an exercise plan  · Do not have anal sex  Anal sex can weaken the skin around your rectum and anus  · Avoid heavy lifting  This can cause straining and increase your risk for another hemorrhoid  When should I seek immediate care? · You have severe pain in your rectum or around your anus  · You have severe pain in your abdomen and you are vomiting  · You have bleeding from your anus that soaks through your underwear  When should I contact my healthcare provider? · You have frequent and painful bowel movements  · Your hemorrhoid looks or feels more swollen than usual      · You do not have a bowel movement for 2 days or more  · You see or feel tissue coming through your anus  · You have questions or concerns about your condition or care  CARE AGREEMENT:   You have the right to help plan your care  Learn about your health condition and how it may be treated  Discuss treatment options with your healthcare providers to decide what care you want to receive  You always have the right to refuse treatment  The above information is an  only  It is not intended as medical advice for individual conditions or treatments  Talk to your doctor, nurse or pharmacist before following any medical regimen to see if it is safe and effective for you  © Copyright [employer] 2022 Information is for End User's use only and may not be sold, redistributed or otherwise used for commercial purposes  All illustrations and images included in CareNotes® are the copyrighted property of [employer]  or Myra Woodruff   Diverticulosis   WHAT YOU NEED TO KNOW:   What is diverticulosis? Diverticulosis is a condition that causes small pockets called diverticula to form in your intestine  These pockets make it difficult for bowel movements to pass through your digestive system  What causes diverticulosis? Diverticula form when muscles have to work hard to move bowel movements through the intestine  The force causes bulges to form at weak areas in the intestine  This may happen if you eat foods that are low in fiber  Fiber helps give your bowel movements more bulk so they are larger and easier to move through your colon  The following may increase your risk of diverticulosis:  · A history of constipation    · Age 36 or older    · Obesity    · Lack of exercise    What are the signs and symptoms of diverticulosis? Diverticulosis usually does not cause any signs or symptoms   It may cause any of the following in some people:  · Pain or discomfort in your lower abdomen    · Abdominal bloating    · Constipation or diarrhea    How is diverticulosis diagnosed? Your healthcare provider will examine you and ask about your bowel movements, diet, and symptoms  He or she will also ask about any medical conditions you have or medicines you take  You may need any of the following:  · Blood tests  may be done to check for signs of inflammation  · A barium enema  is an x-ray of your colon that may show diverticula  A tube is put into your anus, and a liquid called barium is put through the tube  Barium is used so that healthcare providers can see your colon more clearly  · Flexible sigmoidoscopy  is a test to look for any changes in your lower intestines and rectum  It may also show the cause of any bleeding or pain  A soft, bendable tube with a light on the end will be put into your anus  It will then be moved forward into your intestine  · A colonoscopy  is used to look at your whole colon  A scope (long bendable tube with a light on the end) is used to take pictures  This test may show diverticula  · A CT scan , or CAT scan, may show diverticula  You may be given contrast liquid before the scan  Tell the healthcare provider if you have ever had an allergic reaction to contrast liquid  How is diverticulosis managed? The goal of treatment is to manage any symptoms you have and prevent other problems such as diverticulitis  Diverticulitis is swelling or infection of the diverticula  Your healthcare provider may recommend any of the following:  · Eat a variety of high-fiber foods  High-fiber foods help you have regular bowel movements  High-fiber foods include cooked beans, fruits, vegetables, and some cereals  Most adults need 25 to 35 grams of fiber each day  Your healthcare provider may recommend that you have more  Ask your healthcare provider how much fiber you need  Increase fiber slowly  You may have abdominal discomfort, bloating, and gas if you add fiber to your diet too quickly   You may need to take a fiber supplement if you are not getting enough fiber from food  · Medicines  to soften your bowel movements may be given  You may also need medicines to treat symptoms such as bloating and pain  · Drink liquids as directed  You may need to drink 2 to 3 liters (8 to 12 cups) of liquids every day  Ask your healthcare provider how much liquid to drink each day and which liquids are best for you  · Apply heat  on your abdomen for 20 to 30 minutes every 2 hours for as many days as directed  Heat helps decrease pain and muscle spasms  How can I help prevent diverticulitis or other symptoms? The following may help decrease your risk for diverticulitis or symptoms, such as bleeding  Talk to your provider about these or other things you can do to prevent problems that may occur with diverticulosis  · Exercise regularly  Ask your healthcare provider about the best exercise plan for you  Exercise can help you have regular bowel movements  Get 30 minutes of exercise on most days of the week  · Maintain a healthy weight  Ask your healthcare provider what a healthy weight is for you  Ask him or her to help you create a weight loss plan if you are overweight  · Do not smoke  Nicotine and other chemicals in cigarettes increase your risk for diverticulitis  Ask your healthcare provider for information if you currently smoke and need help to quit  E-cigarettes or smokeless tobacco still contain nicotine  Talk to your healthcare provider before you use these products  · Ask your healthcare provider if it is safe to take NSAIDs  NSAIDs may increase your risk of diverticulitis  When should I seek immediate care? · You have severe pain on the left side of your lower abdomen  · Your bowel movements are bright or dark red  When should I call my doctor? · You have a fever and chills  · You feel dizzy or lightheaded  · You have nausea, or you are vomiting  · You have a change in your bowel movements      · You have questions or concerns about your condition or care  CARE AGREEMENT:   You have the right to help plan your care  Learn about your health condition and how it may be treated  Discuss treatment options with your healthcare providers to decide what care you want to receive  You always have the right to refuse treatment  The above information is an  only  It is not intended as medical advice for individual conditions or treatments  Talk to your doctor, nurse or pharmacist before following any medical regimen to see if it is safe and effective for you  © Copyright [employer] 2022 Information is for End User's use only and may not be sold, redistributed or otherwise used for commercial purposes  All illustrations and images included in CareNotes® are the copyrighted property of [employer]  or [facility] Yolanda Woodruff   Colorectal Polyps   WHAT YOU NEED TO KNOW:   What are colorectal polyps? Colorectal polyps are small growths of tissue in the lining of the colon and rectum  Most polyps are usually benign (not cancer)  Certain types of polyps, called adenomatous polyps, may turn into cancer  What increases my risk for colorectal polyps? The exact cause of colorectal polyps is unknown  The following may increase your risk:  · Older age    · Foods high in fat and low in fiber    · Family history of polyps    · Intestinal diseases, such as Crohn disease or ulcerative colitis    · Smoking cigarettes or drinking alcohol    · Lack of physical activity, such as exercise    · Obesity    What are the signs and symptoms of colorectal polyps? · Blood in your bowel movement or bleeding from the rectum    · Change in bowel movement habits, such as diarrhea or constipation    · Abdominal pain    What do I need to know about colorectal polyp screening and diagnosis? Screening means you are checked for polyps that may be cancer, even if you do not have signs or symptoms   Screening is recommended starting at age 48 and continuing to age 76 if you are at average risk  Your healthcare provider may suggest screening starting at age 39  Screening may start before you are 45 or continue after you are 75 if your risk is high  Your provider will tell you how often to get screened  Timing depends on the type of screening and if polyps are found  Timing also depends on your age and if you are at increased risk for cancer  Screening may be recommended every 1, 2, 5, or 10 years  Your healthcare provider will need to test polyps to find out if they are cancer  Any of the following may be used to find polyps:  · A digital rectal exam  means your provider will use a finger to check for polyps  · A barium enema  is an x-ray of the colon  A tube is put into your anus, and a liquid called barium is put through the tube  Barium is used so that healthcare providers can see your colon better on the x-ray film  · A virtual colonoscopy  is a CT scan that takes pictures of the inside of your colon and rectum  A small, flexible tube is put into your rectum and air or carbon dioxide (gas) is used to expand your colon  This lets healthcare providers clearly see your colon and any polyps on a monitor  · Colonoscopy or sigmoidoscopy  are procedures to help your provider see the inside of your colon  He or she will use a flexible tube with a small light and camera on the end  During a sigmoidoscopy, your provider will only look at rectum and lower colon  During a colonoscopy, he or she will look at the full length of your colon  A small amount of tissue may be removed from your colon for tests  How are colorectal polyps treated? Small, benign polyps may not need treatment  Your healthcare provider will check the polyp over time to make sure it is not changing   Polyps that are cancer may be removed with one of the following:  · A polypectomy  is a minimally invasive procedure to remove a polyp during a colonoscopy or sigmoidoscopy  Your healthcare provider may need to remove the polyp with a laparoscope  Laparoscopy is done by inserting a small, flexible scope into incisions made on your abdomen  · Surgery  may be needed to remove large or deep polyps that cannot be removed in a polypectomy  Tissues or lymph nodes near a polyp may also be removed  This helps stop cancer from spreading  What can I do to lower my risk for colorectal polyps? · Eat a variety of healthy foods  Healthy foods include fruit, vegetables, whole-grain breads, low-fat dairy products, beans, lean meat, and fish  Ask if you need to be on a special diet  · Maintain a healthy weight  Ask your healthcare provider what a healthy weight is for you  Ask him or her to help with a weight loss plan if you are overweight  · Exercise as directed  Begin to exercise slowly and do more as you get stronger  Talk with your healthcare provider before you start an exercise program          · Limit alcohol  Your risk for polyps increases the more you drink  · Do not smoke  Nicotine and other chemicals in cigarettes and cigars increase your risk for polyps  Ask your healthcare provider for information if you currently smoke and need help to quit  E-cigarettes or smokeless tobacco still contain nicotine  Talk to your healthcare provider before you use these products  Where can I find more information? · Samantha Cobb ([facility])  [address], [place] [address]  Phone: [phone]  Web Address: Brooklyn Muñiz  [facility] gov    Call your local emergency number (911 in the [address]) if:   · You have sudden shortness of breath  · You have a fast heart rate, fast breathing, or are too dizzy to stand up  When should I seek immediate care? · You have severe abdominal pain  · You see blood in your bowel movement  When should I call my doctor? · You have a fever      · You have chills, a cough, or feel weak and achy  · You have abdominal pain that does not go away or gets worse after you take medicine  · Your abdomen is swollen  · You are losing weight without trying  · You have questions or concerns about your condition or care  CARE AGREEMENT:   You have the right to help plan your care  Learn about your health condition and how it may be treated  Discuss treatment options with your healthcare providers to decide what care you want to receive  You always have the right to refuse treatment  The above information is an  only  It is not intended as medical advice for individual conditions or treatments  Talk to your doctor, nurse or pharmacist before following any medical regimen to see if it is safe and effective for you  © Copyright [employer] 2022 Information is for End User's use only and may not be sold, redistributed or otherwise used for commercial purposes  All illustrations and images included in CareNotes® are the copyrighted property of A D A M , Inc  or [address] [place] [address] K[place]   Upper Endoscopy and Colonoscopy   WHAT YOU NEED TO KNOW:   An upper endoscopy is also called an upper gastrointestinal (GI) endoscopy, or an esophagogastroduodenoscopy (EGD)  It is a procedure to examine the inside of your esophagus, stomach, and duodenum (first part of the small intestine) with a scope  You may feel bloated, gassy, or have some abdominal discomfort after your procedure  Your throat may be sore for 24 to 36 hours  You may burp or pass gas from air that is still inside your body  A colonoscopy is a procedure to examine the inside of your colon (intestine) with a scope  Polyps or tissue growths may have been removed during your colonoscopy  It is normal to feel bloated and to have some abdominal discomfort  You should be passing gas  If you have hemorrhoids or you had polyps removed, you may have a small amount of bleeding            DISCHARGE INSTRUCTIONS:   Seek care immediately if:   · You have sudden, severe abdominal pain  · You have problems swallowing  · You have a large amount of black, sticky bowel movements or blood in your bowel movements  · You have sudden trouble breathing  · You feel weak, lightheaded, or faint or your heart beats faster than normal for you  Contact your healthcare provider if:   · You have a fever and chills  · You have nausea or are vomiting  · Your abdomen is bloated or feels full and hard  · You have abdominal pain  · You have a large amount of black, sticky bowel movements or blood in your bowel movements  · You have not had a bowel movement for 3 days after your procedure  · You have rash or hives  · You have questions or concerns about your procedure  Activity:   ·       Do not lift, strain, or run for 24 hours after your procedure  ·       Rest after your procedure  You have been given medicine to relax you  Do not drive or make important decisions until the day after your procedure  Return to your normal activity as directed  ·       Relieve gas and discomfort from bloating by lying on your right side with a heating pad on your abdomen  You may need to take short walks to help the gas move out  Eat small meals until bloating is relieved  Follow up with your healthcare provider as directed: Write down your questions so you remember to ask them during your visits  If you take a blood thinner, please review the specific instructions from your endoscopist about when you should resume it  These can be found in the Recommendation and Your Medication list sections of this After Visit Summary  Gastritis   WHAT YOU NEED TO KNOW:   What is gastritis? Gastritis is inflammation or irritation of the lining of your stomach  What increases my risk for gastritis?    · Infection with bacteria, a virus, or a parasite    · NSAIDs, aspirin, or steroid medicine    · Use of tobacco products or alcohol    · Trauma such as an injury to your stomach or intestine    · Autoimmune disorders such as diabetes, thyroid disease, or Crohn disease    · Stress    · Age older than 60 years    · Illegal drugs, such as cocaine    What are the signs and symptoms of gastritis? · Stomach pain, burning, or tenderness when you press on it    · Stomach fullness or tightness    · Nausea or vomiting    · Loss of appetite, or feeling full quickly when you eat    · Bad breath    · Fatigue or feeling more tired than usual    · Heartburn    How is gastritis diagnosed? Your healthcare provider will ask about your signs and symptoms and examine you  You may need any of the following:  · Blood tests  may be used to show an infection, dehydration, or anemia (low red blood cell levels)  · A bowel movement sample  may be tested for blood or the germ that may be causing your gastritis  · A breath test  may show if H pylori is causing your gastritis  You will be given a liquid to drink  Then you will breathe into a bag  Your healthcare provider will measure the amount of carbon dioxide in your breath  Extra amounts of carbon dioxide may mean you have an H pylori infection  · An endoscopy  may be used to look for irritation or bleeding in your stomach  Your healthcare provider will use an endoscope (tube with a light and camera on the end) during the procedure  He or she may take a sample from your stomach to be tested  How is gastritis treated? Your symptoms may go away without treatment  Treatment will depend on what is causing your gastritis  Your healthcare provider may recommend changes to the medicines you take  Medicines may be given to help treat a bacterial infection or decrease stomach acid  How can I manage or prevent gastritis? · Do not smoke  Nicotine and other chemicals in cigarettes and cigars can make your symptoms worse and cause lung damage   Ask your healthcare provider for information if you currently smoke and need help to quit  E-cigarettes or smokeless tobacco still contain nicotine  Talk to your healthcare provider before you use these products  · Do not drink alcohol  Alcohol can prevent healing and make your gastritis worse  Talk to your healthcare provider if you need help to stop drinking  · Do not take NSAIDs or aspirin unless directed  These and similar medicines can cause irritation of your stomach lining  If your healthcare provider says it is okay to take NSAIDs, take them with food  · Do not eat foods that cause irritation  Foods such as oranges and salsa can cause burning or pain  Eat a variety of healthy foods  Examples include fruits (not citrus), vegetables, low-fat dairy products, beans, whole-grain breads, and lean meats and fish  Try to eat small meals, and drink water with your meals  Do not eat for at least 3 hours before you go to bed  · Find ways to relax and decrease stress  Stress can increase stomach acid and make gastritis worse  Activities such as yoga, meditation, or listening to music can help you relax  Spend time with friends, or do things you enjoy  Call 911 for any of the following:   · You develop chest pain or shortness of breath  When should I seek immediate care? · You vomit blood  · You have black or bloody bowel movements  · You have severe stomach or back pain  When should I contact my healthcare provider? · You have a fever  · You have new or worsening symptoms, even after treatment  · You have questions or concerns about your condition or care  CARE AGREEMENT:   You have the right to help plan your care  Learn about your health condition and how it may be treated  Discuss treatment options with your healthcare providers to decide what care you want to receive  You always have the right to refuse treatment  The above information is an  only   It is not intended as medical advice for individual conditions or treatments  Talk to your doctor, nurse or pharmacist before following any medical regimen to see if it is safe and effective for you  © Copyright [employer] 2022 Information is for End User's use only and may not be sold, redistributed or otherwise used for commercial purposes  All illustrations and images included in CareNotes® are the copyrighted property of [employer] A M [employer] Inc  or Myra Woodruff   Hiatal Hernia   WHAT YOU NEED TO KNOW:   What is a hiatal hernia? A hiatal hernia is a condition that causes part of your stomach to bulge through the hiatus (small opening) in your diaphragm  The part of the stomach may move up and down, or it may get trapped above the diaphragm  What increases my risk for a hiatal hernia? The exact cause of a hiatal hernia is not known  You may have been born with a large hiatus  The following may increase your risk of a hiatal hernia:  · Obesity    · Older age    · Medical conditions such as diverticulosis or esophagitis    · Previous surgery of the esophagus or stomach or trauma such as from a motor vehicle accident    What are the types of hiatal hernia? · Type I (sliding hiatal hernia): A portion of the stomach slides in and out of the hiatus  This type is the most common and usually causes gastroesophageal reflux disease (GERD)  GERD occurs when the esophageal sphincter does not close properly and causes acid reflux  The esophageal sphincter is the lower muscle of the esophagus  · Type II (paraesophageal hiatal hernia):  Type II hiatal hernia forms when a part of the stomach squeezes through the hiatus and lies next to the esophagus  · Type III (combined):  Type III hiatal hernia is a combination of a sliding and a paraesophageal hiatal hernia  · Type IV (complex paraesophageal hiatal hernia):   The whole stomach, the small and large bowels, spleen, pancreas, or liver is pushed up into the chest     What are the signs and symptoms of a hiatal hernia? The most common symptom is heartburn  This usually occurs after meals and spreads to your neck, jaw, or shoulder  You may have no signs or symptoms, or you may have any of the following:  · Abdominal pain, especially in the area just above your navel    · Bitter or acid taste in your mouth    · Trouble swallowing    · Coughing or hoarseness    · Chest pain or shortness of breath that occurs after eating    · Frequent burping or hiccups    · Uncomfortable feeling of fullness after eating    How is a hiatal hernia diagnosed? · An upper GI series test  includes x-rays of your esophagus, stomach, and your small intestines  It is also called a barium swallow test  You will be given barium (a chalky liquid) to drink before the pictures are taken  This liquid helps your stomach and intestines show up better on the x-rays  An upper GI series can show if you have an ulcer, a blocked intestine, or other problems  · An endoscopy  uses a scope to see the inside of your digestive tract  A scope is a long, bendable tube with a light on the end of it  A camera may be hooked to the scope to take pictures  How is a hiatal hernia treated? Treatment depends on the type of hiatal hernia you have and on your symptoms  You may not need any treatment  You may need any of the following:  · Medicines  may be given to relieve heartburn symptoms  These medicines help to decrease or block stomach acid  You may also be given medicines that help to tighten the esophageal sphincter  · Surgery  may be done when medicines cannot control your symptoms, or other problems are present  Your healthcare provider may also suggest surgery depending on the type of hernia you have  Your healthcare provider can put your stomach back into its normal location  He or she may make the hiatus (hole) smaller and anchor your stomach in your abdomen   Fundoplication is a surgery that wraps the upper part of the stomach around the esophageal sphincter to strengthen it  How can I manage my symptoms? The following nutrition and lifestyle changes may be recommended to relieve symptoms of heartburn:     · Avoid foods that make your symptoms worse  These may include spicy foods, fruit juices, alcohol, caffeine, chocolate, and mint  · Eat several small meals during the day  Small meals give your stomach less food to digest     · Avoid lying down and bending forward after you eat  Do not eat meals 2 to 3 hours before bedtime  This decreases your risk for reflux  · Maintain a healthy weight  If you are overweight, weight loss may help relieve your symptoms  · Sleep with your head elevated  at least 6 inches  · Do not smoke  Smoking can increase your symptoms of heartburn  Call your local emergency number (911 in the [address]) if:   · You have severe chest pain and sudden trouble breathing  When should I seek immediate care? · You have severe abdominal pain  · You try to vomit but nothing comes out (retching)  · Your bowel movements are black or bloody  · Your vomit looks like coffee grounds or has blood in it  When should I call my doctor? · Your symptoms are getting worse  · You have nausea, and you are vomiting  · You are losing weight without trying  · You have questions or concerns about your condition or care  CARE AGREEMENT:   You have the right to help plan your care  Learn about your health condition and how it may be treated  Discuss treatment options with your healthcare providers to decide what care you want to receive  You always have the right to refuse treatment  The above information is an  only  It is not intended as medical advice for individual conditions or treatments  Talk to your doctor, nurse or pharmacist before following any medical regimen to see if it is safe and effective for you    © Copyright [employer] 2022 Information is for End User's use only and may not be sold, redistributed or otherwise used for commercial purposes   All illustrations and images included in CareNotes® are the copyrighted property of A D A M , Inc  or [facility] [address]

## 2022-05-02 ENCOUNTER — OFFICE VISIT (OUTPATIENT)
Dept: CARDIOLOGY CLINIC | Facility: CLINIC | Age: 77
End: 2022-05-02
Payer: MEDICARE

## 2022-05-02 VITALS
DIASTOLIC BLOOD PRESSURE: 80 MMHG | WEIGHT: 150 LBS | HEART RATE: 76 BPM | OXYGEN SATURATION: 98 % | SYSTOLIC BLOOD PRESSURE: 140 MMHG | TEMPERATURE: 98.6 F | BODY MASS INDEX: 29.45 KG/M2 | HEIGHT: 60 IN

## 2022-05-02 DIAGNOSIS — I10 BENIGN ESSENTIAL HYPERTENSION: ICD-10-CM

## 2022-05-02 DIAGNOSIS — E78.2 MIXED HYPERLIPIDEMIA: ICD-10-CM

## 2022-05-02 DIAGNOSIS — E03.9 HYPOTHYROIDISM, UNSPECIFIED TYPE: ICD-10-CM

## 2022-05-02 DIAGNOSIS — I25.10 ARTERIOSCLEROTIC CORONARY ARTERY DISEASE: ICD-10-CM

## 2022-05-02 DIAGNOSIS — F41.1 GENERALIZED ANXIETY DISORDER: ICD-10-CM

## 2022-05-02 DIAGNOSIS — R00.0 TACHYCARDIA: ICD-10-CM

## 2022-05-02 DIAGNOSIS — J45.909 MILD ASTHMA WITHOUT COMPLICATION, UNSPECIFIED WHETHER PERSISTENT: ICD-10-CM

## 2022-05-02 PROCEDURE — 1124F ACP DISCUSS-NO DSCNMKR DOCD: CPT | Performed by: INTERNAL MEDICINE

## 2022-05-02 PROCEDURE — 99214 OFFICE O/P EST MOD 30 MIN: CPT | Performed by: INTERNAL MEDICINE

## 2022-05-02 PROCEDURE — 93000 ELECTROCARDIOGRAM COMPLETE: CPT | Performed by: INTERNAL MEDICINE

## 2022-05-02 RX ORDER — METOPROLOL SUCCINATE 25 MG/1
25 TABLET, EXTENDED RELEASE ORAL DAILY
Qty: 90 TABLET | Refills: 3 | Status: SHIPPED | OUTPATIENT
Start: 2022-05-02

## 2022-05-02 RX ORDER — DEXAMETHASONE 0.5 MG/5ML
SOLUTION ORAL
COMMUNITY
Start: 2022-04-20

## 2022-05-02 RX ORDER — LEVOTHYROXINE SODIUM 0.1 MG/1
100 TABLET ORAL DAILY
COMMUNITY
Start: 2022-01-28

## 2022-05-02 NOTE — PROGRESS NOTES
Progress Note - Cardiology Office  [facility]    Louise Roberts 67 y o  female MRN: [id number]  : 1945  Encounter: [id number]      Assessment:     1  Benign essential hypertension    2  Arteriosclerotic coronary artery disease    3  Mixed hyperlipidemia    4  Tachycardia    5  Generalized anxiety disorder    6  Mild asthma without complication, unspecified whether persistent    7  Hypothyroidism, unspecified type        Discussion Summary and Plan:    1  Benign essential hypertension  Patient blood pressure is borderline  She was posterior metoprolol XL 25 mg not taking it will represcribed it  2  Coronary artery disease  Status post cardiac catheterization twice in  and 14 with mild nonobstructive coronary artery disease  Continue medical Rx with aspirin statin  She has no symptoms of angina    3  Dyslipidemia  She is on Lipitor 10 mg she get blood test with her primary care doctor  She is tolerating it very well    4  Hypothyroidism  On Synthroid  She is on levothyroxine TSH has been acceptable  Monitor TSH    5  History of DJD/back pain and fibromyalgia  Currently she is having neck pain she had MRI done follow up with Neurology in medical MD      6  Anxiety  Patient was reassured that her symptoms of less likely to be cardiac in origin  Discussed with    7  Large hiatal hernia status post procedure by endoscopy for treatment  She follows with GI  No more constipation at this time    Continue current Rx  She is tolerating her statins and metoprolol very well  No cardiovascular issues stable cardiovascular status  Please call [phone], if any questions  Counseling :  A description of the counseling  Goals and Barriers  Patient's ability to self care: Yes  Medication side effect reviewed with patient in detail and all their questions answered to their satisfaction  HPI :     Louise Roberts is a 67y o  year old female who came for follow up  Patient has with a past medical history significant for mild nonobstructive coronary artery disease cardiac catheterization, dyslipidemia, back pain, asthma who came to see us for routine follow-up  Patient complains of some atypical chest pain which had one episode a few weeks ago with no relation to exercise  Patient is pretty active and walks every day with no symptoms of any chest pain or any shortness of breath  She has no fever no chills no nausea no vomiting no other significant complaint     03/19/2021  Above reviewed  Patient came for follow-up she is doing well  She has not wheezing at this time  Asthma is under control  She has a catheterization done in 2012 and 14 found to have mild nonobstructive disease  Since then she has no symptoms of chest pain today heart rate 63 beats per minute EKG shows no significant change  She has decently active  Her weight has remained stable around 140 lb  No nausea no fever no chills no PND no orthopnea no other issues  She does get easily anxious and then her heart rate goes faster she is taking antianxiety medication  11/18/2021  Above reviewed  Patient came for follow-up  She had history of nonobstructive coronary artery disease by catheterization in 2012 and 14, bronchial asthma, and history of anxiety  She came for follow-up when she gets anxious her heart rate goes faster  She is taking now anti anxiety medications  Her vitals has been stable  She has an echo done in November 2020 which shows normal LV systolic function, with mild LVH and no significant valvular disease  Lately she has some issues with her neck pain and she has MRI done and she follows with Neurology  History of short-term memory loss and difficulty sleeping otherwise no cardiovascular issues  No nausea no vomiting no PND no orthopnea  She came with her sisters  05/02/2022  Above reviewed    Patient came for follow-up she is doing well from cardiac point of view her main issue is her anxiety  She had a medical history significant nonobstructive disease by catheterization in 2012 in 2014, bronchial asthma, history of anxiety, dyslipidemia, hypothyroidism who has some exertional shortness of breath  She also history of bronchial asthma and she is using inhalers  She is not taking other inhalers as she feels she does not need them  She had an echo done in November 2020 which shows normal LV systolic function  She is struggling with her short-term memory  Otherwise no cardiovascular issues EKG shows normal sinus rhythm with no change from previous EKG no nausea no vomiting no fever no chills no PND  Has exertional shortness of breath which is chronic and not changed  Review of Systems   Constitutional: Negative for activity change, chills, diaphoresis, fever and unexpected weight change  HENT: Negative for congestion  Eyes: Negative for discharge and redness  Respiratory: Positive for shortness of breath  Negative for cough, chest tightness and wheezing  Chronic exertional   Cardiovascular: Negative  Negative for chest pain, palpitations and leg swelling  Gastrointestinal: Negative for abdominal pain, diarrhea and nausea  Endocrine: Negative  Genitourinary: Negative for decreased urine volume and urgency  Musculoskeletal: Negative  Negative for arthralgias, back pain and gait problem  Skin: Negative for rash and wound  Allergic/Immunologic: Negative  Neurological: Negative for dizziness, seizures, syncope, weakness, light-headedness and headaches  Hematological: Negative  Psychiatric/Behavioral: Positive for sleep disturbance  Negative for agitation and confusion  The patient is nervous/anxious          Historical Information   Past Medical History:   Diagnosis Date    Allergic rhinitis     Last Assessed:10/22/2014    Alzheimer disease ([address]     Angina pectoris ([address] )     Ankle fracture, right     casted    Anxiety     Arthritis     Asthma     Cisneros esophagus     Bursitis     Cervical herniated disc 04/04/2022    Colon polyp     Constipation     Constipation     [facility]     Coronary artery disease     Cystitis     chronic    Dementia ([facility] [facility] 04/04/2022    short term memory loss    Disease of thyroid gland     hypothyroid    Diverticulosis     Fasciculations     Fibromyalgia     Fibromyalgia, primary     GERD (gastroesophageal reflux disease)     H/o Lyme disease     Heart murmur     Hiatal hernia     History of Clostridium difficile infection     Hyper reflexia     Hyperlipidemia     Hypertension     on 4/4/22 pt denies having HTN    Hypothyroid     hypo    IBS (irritable bowel syndrome)     Labral tear of shoulder     left    Lichen sclerosus of female genitalia 2016    Murmur     Neck pain, chronic     gets steroid injections-none since late 2016    Neuralgia     Oral lichen planus     Peritonitis ([facility] )     Spinal stenosis     Spinal stenosis     Tachycardia 04/04/2022    at times    Ulcer     Ulcer of gastric fundus     Vertigo     Wears glasses     Wears partial dentures     upper and lower     Past Surgical History:   Procedure Laterality Date    ABDOMINAL SURGERY      exploratory-removal of appendix    APPENDECTOMY      CARDIAC CATHETERIZATION      CARPAL TUNNEL RELEASE Bilateral     CHOLECYSTECTOMY      COLONOSCOPY      sigmoid diverticulosis,5mm rectal tubular adenoma5/06    DILATION AND CURETTAGE OF UTERUS      x3 miscarriages    FOOT SURGERY Right     5th toe-hammertoe repair    FOOT SURGERY      HIATAL HERNIA REPAIR      HYSTERECTOMY  01/09/2012    complete ; for bleeding,tubal ligation,vaginal prolapse and rectocele repair    OVARIAN CYST REMOVAL      MO COLONOSCOPY FLX DX W/COLLJ SPEC WHEN PFRMD N/A 10/23/2017    Procedure: EGD AND COLONOSCOPY;  Surgeon: Nae Hooper MD;  Location: [facility];   Service: Gastroenterology    [place] SHLDR Isabelle Rowell Left 11/13/2017    Procedure: SHOULDER ARTHROSCOPY WITH SUBACROMIAL DECOMPRESSION, ROTATOR CUFF REPAIR;  Surgeon: Campos Coley MD;  Location: [facility] MA[facility];  Service: Orthopedics    ROTATOR CUFF REPAIR Right     TONSILLECTOMY       Social History     Substance and Sexual Activity   Alcohol Use No    Comment: stopped drinking in 2012     Social History     Substance and Sexual Activity   Drug Use Never     Social History     Tobacco Use   Smoking Status Never Smoker   Smokeless Tobacco Never Used     Family History:   Family History   Problem Relation Age of Onset    Cancer Mother         colon    Ulcerative colitis Mother     Alzheimer's disease Father     Heart disease Sister         MI x4-angioplasty x4 stents    Colonic polyp Sister     Ulcerative colitis Sister     Asthma Sister     COPD Sister     Cancer Brother         brain tumor-age 6    Cancer Brother 72        prostate    Arthritis Family     Osteoarthritis Family     Cancer Other         colon cancer    Crohn's disease Neg Hx     Liver cancer Neg Hx        Meds/Allergies     Allergies   Allergen Reactions    Omnipaque [Iohexol] Other (See Comments)     Shakes, "passed out"    Pneumovax 23 [Pneumococcal Vac Polyvalent] Hives    Iodine - Food Allergy     Iv Dye  [Iodinated Diagnostic Agents] Confusion     Annotation - [id number]: shaking    Aspirin GI Intolerance and Nausea Only     Reaction Date: [id number]; Annotation - [id number]: 325mg causes bleeding  Cannot take a dose higher than 81mg-pt has a hx of ulcer    Codeine GI Intolerance     N/V    Flexeril [Cyclobenzaprine]      Unknown reaction per pt   Allergy was noted on physicians note    Latex Rash    Other Rash     Adhesive Tape-caused a rash       Current Outpatient Medications:     albuterol (ACCUNEB) 0 63 MG/3ML nebulizer solution, Take 1 ampule by nebulization every 6 (six) hours as needed for wheezing, Disp: , Rfl:     albuterol (PROVENTIL HFA,VENTOLIN HFA) 90 mcg/act inhaler, Inhale 2 puffs every 6 (six) hours as needed for wheezing , Disp: , Rfl:     atorvastatin (LIPITOR) 10 mg tablet, Take 10 mg by mouth daily , Disp: , Rfl:     dexamethasone 0 5 mg/5 mL solution, RINSE WITH 1 TEASPOONFUL  FOR 2 MINUTES, 2-4 TIMES A DAY, THEN SPIT OUT, Disp: , Rfl:     donepezil (ARICEPT) 10 mg tablet, Take 10 mg by mouth daily at bedtime , Disp: , Rfl: 5    famotidine (PEPCID) 40 MG tablet, Take 1 tablet (40 mg total) by mouth daily at bedtime, Disp: 30 tablet, Rfl: 1    ferrous sulfate 325 (65 FE) MG EC tablet, Take 1 tablet by mouth daily, Disp: , Rfl:     levothyroxine 100 mcg tablet, Take 100 mcg by mouth daily, Disp: , Rfl:     metoprolol succinate (TOPROL-XL) 25 mg 24 hr tablet, Take 1 tablet (25 mg total) by mouth daily, Disp: 90 tablet, Rfl: 3    pantoprazole (PROTONIX) 40 mg tablet, Take 1 tablet (40 mg total) by mouth daily, Disp: 30 tablet, Rfl: 2    PARoxetine (PAXIL) 10 mg tablet, Take 10 mg by mouth daily , Disp: , Rfl:     QUEtiapine (SEROquel) 25 mg tablet, Take 25 mg by mouth daily at bedtime, Disp: , Rfl:     aspirin 81 mg chewable tablet, Chew 81 mg every morning   (Patient not taking: Reported on 5/2/2022 ), Disp: , Rfl:     cholecalciferol (VITAMIN D3) 1,000 units tablet, Take 1,000 Units by mouth daily (Patient not taking: Reported on 5/2/2022 ), Disp: , Rfl:     Diclofenac Sodium (VOLTAREN) 1 %, Apply 2 g topically 4 (four) times a day (Patient not taking: Reported on 5/2/2022 ), Disp: 1 Tube, Rfl: 1    fluticasone-vilanterol (Breo Ellipta) 100-25 mcg/inh inhaler, Inhale 1 puff daily Rinse mouth after use   (Patient not taking: Reported on 5/2/2022 ), Disp: , Rfl:     ipratropium (ATROVENT) 0 03 % nasal spray, 2 sprays into each nostril every 12 (twelve) hours (Patient not taking: Reported on 5/2/2022 ), Disp: 30 mL, Rfl: 6    Multiple Vitamins-Minerals (ALIVE WOMENS 50+ PO), Take by mouth (Patient not taking: Reported on 5/2/2022 ), Disp: , Rfl:     ondansetron (ZOFRAN) 4 mg tablet, Take 1 tablet (4 mg total) by mouth every 6 (six) hours for 12 days, Disp: 12 tablet, Rfl: 0    vitamin B-12 (VITAMIN B-12) 1,000 mcg tablet, Take 1,000 mcg by mouth daily  (Patient not taking: Reported on 5/2/2022 ), Disp: , Rfl:     Vitals: Blood pressure 140/80, pulse 76, temperature 98 6 °F (37 °C), height 5' (1 524 m), weight 68 kg (150 lb), SpO2 98 %, not currently breastfeeding  Body mass index is 29 29 kg/m²  Vitals:    05/02/22 1520   Weight: 68 kg (150 lb)     BP Readings from Last 3 Encounters:   05/02/22 140/80   04/05/22 145/76   11/18/21 140/80       Physical Exam  Constitutional:       General: She is not in acute distress  Appearance: She is well-developed  She is not diaphoretic  Neck:      Thyroid: No thyromegaly  Vascular: No JVD  Trachea: No tracheal deviation  Cardiovascular:      Rate and Rhythm: Normal rate and regular rhythm  Heart sounds: S1 normal and S2 normal  Heart sounds not distant  Murmur heard  Systolic (ejection) murmur is present with a grade of 2/6  No friction rub  No gallop  No S3 or S4 sounds  Pulmonary:      Effort: Pulmonary effort is normal  No respiratory distress  Breath sounds: Normal breath sounds  No wheezing or rales  Chest:      Chest wall: No tenderness  Abdominal:      General: Bowel sounds are normal  There is no distension  Palpations: Abdomen is soft  Tenderness: There is no abdominal tenderness  Musculoskeletal:         General: No deformity  Cervical back: Neck supple  Skin:     General: Skin is warm and dry  Coloration: Skin is not pale  Findings: No rash  Neurological:      Mental Status: She is alert and oriented to person, place, and time     Psychiatric:         Behavior: Behavior normal          Judgment: Judgment normal          Diagnostic Studies Review Cardio:    Stress Test: Nuclear stress test done in 2012 at by Dr Gunnar Busby shows mild to moderate inferior lateral ischemia EF was normal  Catheter done in 2012 and 14 shows no evidence of obstructive coronary artery disease  Echocardiogram/FACUNDO: Echo Doppler done in 2011 shows EF 60%, trace MR, trace TR  Echo Doppler done November 2020 shows normal LV systolic function mild LVH mild MR, trace TR PA pressure 30 mmHg  Catheterization: Cardiac catheterization done in August 2014 shows mild nonobstructive 25-30% stenosis with normal LV systolic function  was no different than cardiac catheter patient done in 2012  Vascular imaging: Vascular study done in April 2015 shows no significant ICA disease  ECG Report: Twelve-lead EKG done 4/7/2017 shows normal sinus heart rate 76 bpm  No cigarette ST changes  Twelve lead EKG done 01/03/2019 shows normal sinus rhythm heart rate 62 beats per minute  No significant ST changes  No change from old EKG    Twelve lead EKG done 07/03/2019 65 beats per minute normal sinus rhythm no significant ST changes no change from old it is normal EKG  Twelve lead EKG 07/03/2019 shows sinus tachycardia heart rate 102 beats per minute as compared to previous EKGs patient's heart rate is faster  Twelve lead EKG 09/30/2020 shows normal sinus rhythm heart rate 68 beats per minute no significant change from old EKG  Twelve lead EKG done 03/19/2021 shows normal sinus rhythm  Heart rate 63 beats per minute  a 12 lead EKG done on 11/18/2021 shows normal sinus rhythm, heart rate 66 beats per minute  Minimal voltage for LVH no change from previous EKG     Twelve lead EKG 05/02/2022 shows normal sinus rhythm heart rate 76 beats per minute no change from previous EKG      Lab Review   Lab Results   Component Value Date    WBC 7 92 08/02/2020    HGB 12 5 08/02/2020    HCT 41 7 08/02/2020    MCV 77 (L) 08/02/2020    RDW 18 1 (H) 08/02/2020     08/02/2020     BMP:  Lab Results   Component Value Date    SODIUM 137 08/02/2020    K 4 1 08/02/2020     08/02/2020    CO2 24 08/02/2020    ANIONGAP 13 4 12/28/2015    BUN 9 08/02/2020    CREATININE 0 74 08/02/2020    GLUCOSE 98 12/28/2015    GLUF 108 (H) 03/13/2018    CALCIUM 9 0 08/02/2020    EGFR 80 08/02/2020     LFT:  Lab Results   Component Value Date    AST 35 08/02/2020    ALT 30 08/02/2020    ALKPHOS 115 08/02/2020    TP 8 0 08/02/2020    ALB 4 0 08/02/2020      Lab Results   Component Value Date    BQH0UKCMKOMZ 4 508 (H) 08/02/2020     Lipid Profile:   Lab Results   Component Value Date    CHOLESTEROL 170 03/13/2018    HDL 45 03/13/2018    LDLCALC 88 03/13/2018    TRIG 183 (H) 03/13/2018     Lab Results   Component Value Date    CHOLESTEROL 170 03/13/2018    CHOLESTEROL 150 07/07/2016     Lab Results   Component Value Date    CKTOTAL 77 10/06/2017     Last TSH 03/13/2008 was 1 6  Dr Tania Prabhakar MD [facility] [place]      "This note has been constructed using a voice recognition system  Therefore there may be syntax, spelling, and/or grammatical errors   Please call if you have any questions  "

## 2022-05-04 ENCOUNTER — OFFICE VISIT (OUTPATIENT)
Dept: OTOLARYNGOLOGY | Facility: CLINIC | Age: 77
End: 2022-05-04
Payer: MEDICARE

## 2022-05-04 VITALS — BODY MASS INDEX: 29.45 KG/M2 | HEIGHT: 60 IN | WEIGHT: 150 LBS | TEMPERATURE: 97.3 F

## 2022-05-04 DIAGNOSIS — H90.3 SENSORINEURAL HEARING LOSS (SNHL), BILATERAL: ICD-10-CM

## 2022-05-04 DIAGNOSIS — H61.23 BILATERAL IMPACTED CERUMEN: ICD-10-CM

## 2022-05-04 DIAGNOSIS — J30.0 VASOMOTOR RHINITIS: ICD-10-CM

## 2022-05-04 DIAGNOSIS — H93.13 BILATERAL TINNITUS: Primary | ICD-10-CM

## 2022-05-04 PROCEDURE — 69210 REMOVE IMPACTED EAR WAX UNI: CPT | Performed by: NURSE PRACTITIONER

## 2022-05-04 PROCEDURE — 99214 OFFICE O/P EST MOD 30 MIN: CPT | Performed by: NURSE PRACTITIONER

## 2022-05-04 RX ORDER — IPRATROPIUM BROMIDE 21 UG/1
2 SPRAY, METERED NASAL EVERY 12 HOURS
Qty: 30 ML | Refills: 6 | Status: SHIPPED | OUTPATIENT
Start: 2022-05-04

## 2022-05-04 NOTE — ASSESSMENT & PLAN NOTE
Vasomotor rhinitis  Reviewed nature of rhinitis and symptoms associated including rhinorrhea  Recommend using saline irrigation and nasal steroids or Atrovent nasal spray on daily basis  Discussed surgical options if needed including Clarifix procedure  Reviewed actual cryotherapy procedure in detail including risks and benefits in the office setting  Agreed to use saline and Atrovent nasal spray daily and to consider Clarifix procedure        Sensorineural hearing loss (SNHL), bilateral  Audiogram completed last visit indicated mild bilateral high pitch frequency SNHL with good word discrimination and tymps type A  Discussed options for bilateral tinnitus including adding background noise, tinnitus retraining therapy, masking device, or acceptance  Offered options for bilateral SNHL including acceptance, lifestyle changes such as moving closer to those who are speaking, or hearing amplification  She would qualify for hearing amplification based on audiogram   Discussed hearing aid options including facilities to obtain a hearing aid from as well as costs and benefits  Discussed that quality of life may improve with hearing amplification with tinnitus masking and she agreed to consider  Pt will follow up with our office annually              Bilateral impacted cerumen     On exam noted bilateral cerumen impaction and unable to fully view tympanic membrane  Cerumen impaction removed bilateral eac with alligator forceps and suction, pt tolerated procedure well  Upon removal, improved hearing and decreased clogged sensation of bilateral ears  Discussed routine cerumen care  Encourage ongoing follow up annually to monitor for cerumen and hearing

## 2022-05-04 NOTE — PROGRESS NOTES
Assessment/Plan:    Sensorineural hearing loss (SNHL), bilateral  Vasomotor rhinitis  Reviewed nature of rhinitis and symptoms associated including rhinorrhea  Recommend using saline irrigation and nasal steroids or Atrovent nasal spray on daily basis  Discussed surgical options if needed including Clarifix procedure  Reviewed actual cryotherapy procedure in detail including risks and benefits in the office setting  Agreed to use saline and Atrovent nasal spray daily and to consider Clarifix procedure        Sensorineural hearing loss (SNHL), bilateral  Audiogram completed last visit indicated mild bilateral high pitch frequency SNHL with good word discrimination and tymps type A  Discussed options for bilateral tinnitus including adding background noise, tinnitus retraining therapy, masking device, or acceptance  Offered options for bilateral SNHL including acceptance, lifestyle changes such as moving closer to those who are speaking, or hearing amplification  She would qualify for hearing amplification based on audiogram   Discussed hearing aid options including facilities to obtain a hearing aid from as well as costs and benefits  Discussed that quality of life may improve with hearing amplification with tinnitus masking and she agreed to consider  Pt will follow up with our office annually              Bilateral impacted cerumen     On exam noted bilateral cerumen impaction and unable to fully view tympanic membrane  Cerumen impaction removed bilateral eac with alligator forceps and suction, pt tolerated procedure well  Upon removal, improved hearing and decreased clogged sensation of bilateral ears  Discussed routine cerumen care  Encourage ongoing follow up annually to monitor for cerumen and hearing           Diagnoses and all orders for this visit:    Bilateral tinnitus    Vasomotor rhinitis  -     ipratropium (ATROVENT) 0 03 % nasal spray; 2 sprays into each nostril every 12 (twelve) hours    Bilateral impacted cerumen  -     Ear cerumen removal    Sensorineural hearing loss (SNHL), bilateral          Subjective:      Patient ID: Juan Carlos Decker is a 67 y o  female  Presents today as a follow up due to tinnitus in both ears  High pitched ringing in both ears  More right vs left  No otalgia, no otorrhea  Hearing gradually worsening  No current hearing aids  Covid vaccine Jan/Feb 2021  Denies tinnitus associated with vaccine  Frequent runny nose  History of dementia  Here today with daughter         The following portions of the patient's history were reviewed and updated as appropriate: allergies, current medications, past family history, past medical history, past social history, past surgical history and problem list     Review of Systems   Constitutional: Negative  HENT: Positive for hearing loss  Negative for congestion, ear discharge, ear pain, nosebleeds, postnasal drip, rhinorrhea, sinus pressure, sinus pain, sore throat, tinnitus and voice change  Eyes: Negative  Respiratory: Negative for chest tightness and shortness of breath  Cardiovascular: Negative  Gastrointestinal: Negative  Endocrine: Negative  Musculoskeletal: Negative  Skin: Negative for color change  Neurological: Negative for dizziness, numbness and headaches  Psychiatric/Behavioral: Negative  Objective:      Temp (!) 97 3 °F (36 3 °C) (Temporal)   Ht 5' (1 524 m)   Wt 68 kg (150 lb)   BMI 29 29 kg/m²          Physical Exam  Constitutional:       Appearance: She is well-developed  HENT:      Head: Normocephalic  Right Ear: Hearing, tympanic membrane, ear canal and external ear normal  No decreased hearing noted  No drainage or tenderness  There is impacted cerumen  Tympanic membrane is not perforated or erythematous  Left Ear: Hearing, tympanic membrane, ear canal and external ear normal  No decreased hearing noted  No drainage or tenderness   There is impacted cerumen  Tympanic membrane is not perforated or erythematous  Nose: Nose normal  No nasal deformity or septal deviation  Mouth/Throat:      Mouth: Mucous membranes are not pale and not dry  No oral lesions  Dentition: Normal dentition  Pharynx: Uvula midline  No oropharyngeal exudate  Neck:      Trachea: No tracheal deviation  Cardiovascular:      Rate and Rhythm: Normal rate  Pulmonary:      Effort: Pulmonary effort is normal  No accessory muscle usage or respiratory distress  Musculoskeletal:      Right shoulder: Normal range of motion  Cervical back: Full passive range of motion without pain, normal range of motion and neck supple  Lymphadenopathy:      Cervical: No cervical adenopathy  Skin:     General: Skin is warm and dry  Neurological:      Mental Status: She is alert and oriented to person, place, and time  Cranial Nerves: No cranial nerve deficit  Sensory: No sensory deficit  Psychiatric:         Behavior: Behavior is cooperative  Ear cerumen removal    Date/Time: 5/4/2022 12:06 PM  Performed by: ROSIE Browning  Authorized by: ROSIE Browning   Universal Protocol:  Consent: Verbal consent obtained  Risks and benefits: risks, benefits and alternatives were discussed  Consent given by: patient  Patient understanding: patient states understanding of the procedure being performed      Patient location:  Clinic  Procedure details:     Local anesthetic:  None    Location:  L ear and R ear    Approach:  External  Post-procedure details:     Complication:  None    Hearing quality:  Normal    Patient tolerance of procedure:   Tolerated well, no immediate complications

## 2022-05-20 DIAGNOSIS — K21.00 GASTROESOPHAGEAL REFLUX DISEASE WITH ESOPHAGITIS, UNSPECIFIED WHETHER HEMORRHAGE: ICD-10-CM

## 2022-05-20 RX ORDER — PANTOPRAZOLE SODIUM 40 MG/1
40 TABLET, DELAYED RELEASE ORAL DAILY
Qty: 30 TABLET | Refills: 2 | Status: SHIPPED | OUTPATIENT
Start: 2022-05-20 | End: 2022-06-09

## 2022-05-23 DIAGNOSIS — K21.00 GASTROESOPHAGEAL REFLUX DISEASE WITH ESOPHAGITIS, UNSPECIFIED WHETHER HEMORRHAGE: ICD-10-CM

## 2022-05-24 RX ORDER — FAMOTIDINE 40 MG/1
40 TABLET, FILM COATED ORAL
Qty: 30 TABLET | Refills: 1 | Status: SHIPPED | OUTPATIENT
Start: 2022-05-24 | End: 2022-06-23

## 2022-06-09 DIAGNOSIS — K21.00 GASTROESOPHAGEAL REFLUX DISEASE WITH ESOPHAGITIS, UNSPECIFIED WHETHER HEMORRHAGE: ICD-10-CM

## 2022-06-09 RX ORDER — PANTOPRAZOLE SODIUM 40 MG/1
TABLET, DELAYED RELEASE ORAL
Qty: 30 TABLET | Refills: 2 | Status: SHIPPED | OUTPATIENT
Start: 2022-06-09

## 2022-09-08 ENCOUNTER — TELEPHONE (OUTPATIENT)
Dept: GASTROENTEROLOGY | Facility: CLINIC | Age: 77
End: 2022-09-08

## 2022-09-13 DIAGNOSIS — K21.00 GASTROESOPHAGEAL REFLUX DISEASE WITH ESOPHAGITIS, UNSPECIFIED WHETHER HEMORRHAGE: ICD-10-CM

## 2022-09-13 RX ORDER — FAMOTIDINE 40 MG/1
40 TABLET, FILM COATED ORAL
Qty: 30 TABLET | Refills: 1 | Status: SHIPPED | OUTPATIENT
Start: 2022-09-13 | End: 2022-10-13

## 2022-10-12 PROBLEM — H61.23 BILATERAL IMPACTED CERUMEN: Status: RESOLVED | Noted: 2021-05-06 | Resolved: 2022-10-12

## 2022-10-18 DIAGNOSIS — J30.0 VASOMOTOR RHINITIS: ICD-10-CM

## 2022-10-18 RX ORDER — IPRATROPIUM BROMIDE 21 UG/1
SPRAY, METERED NASAL
Qty: 30 ML | Refills: 6 | Status: SHIPPED | OUTPATIENT
Start: 2022-10-18

## 2022-11-01 DIAGNOSIS — K21.00 GASTROESOPHAGEAL REFLUX DISEASE WITH ESOPHAGITIS, UNSPECIFIED WHETHER HEMORRHAGE: ICD-10-CM

## 2022-11-01 RX ORDER — FAMOTIDINE 40 MG/1
40 TABLET, FILM COATED ORAL
Qty: 30 TABLET | Refills: 1 | Status: SHIPPED | OUTPATIENT
Start: 2022-11-01 | End: 2022-12-01

## 2022-11-11 ENCOUNTER — PREP FOR PROCEDURE (OUTPATIENT)
Dept: GASTROENTEROLOGY | Facility: CLINIC | Age: 77
End: 2022-11-11

## 2022-11-11 ENCOUNTER — TELEPHONE (OUTPATIENT)
Dept: GASTROENTEROLOGY | Facility: CLINIC | Age: 77
End: 2022-11-11

## 2022-11-11 DIAGNOSIS — K21.00 GASTROESOPHAGEAL REFLUX DISEASE WITH ESOPHAGITIS, UNSPECIFIED WHETHER HEMORRHAGE: Primary | ICD-10-CM

## 2022-11-11 NOTE — TELEPHONE ENCOUNTER
Ahsan Davison 26 Assessment    Name: Luz Molina  YOB: 1945  Last Height: 5' (1 524 m)  Last weight: 68 kg (150 lb)  BMI: 29 29 kg/m²  Procedure: EGD  Diagnosis: GERD  Date of procedure: 1/4/23  Prep: NPO after midnight  Responsible : ? Phone#: ?  Name completing form: Madison Brewster  Date form completed: 11/11/22      If the patient answers yes to any of these questions, schedule in a hospital  Are you pregnant: No  Do you rely on a wheelchair for mobility: No  Have you been diagnosed with End Stage Renal Disease (ESRD): No  Do you need oxygen during the day: No  Have you had a heart attack or stroke within the past three months: No  Have you had a seizure within the past three months: No  Have you ever been informed by anesthesia that you have a difficult airway: No  Additional Questions  Have you had any cardiac testing or are under the care of a Cardiologist (see cardiac list): Yes (Comment: Obtain Cardiac Clearance)  Cardiac list:   Do you have an implanted cardiac defibrillator: No (Comment:  This patient should be scheduled in the hospital)    Have any bleeding problems, such as anemia or hemophilia (If patient has H&H result below 8, schedule in hospital   H&H must be within 30 days of procedure): No    Had an organ transplant within the past 3 months: No    Do you have any present infections: No  Do you get short of breath when walking a few blocks: No  Have you been diagnosed with diabetes: No  Comments (provide cardiac provider information if applicable):

## 2022-11-11 NOTE — TELEPHONE ENCOUNTER
Scheduled date of EGD(as of today):1/4/23    Physician performing EGD:Dr Cutler    Location of EGD:[facility]      Clearances: N/A

## 2022-11-18 ENCOUNTER — OFFICE VISIT (OUTPATIENT)
Dept: CARDIOLOGY CLINIC | Facility: CLINIC | Age: 77
End: 2022-11-18

## 2022-11-18 VITALS
OXYGEN SATURATION: 96 % | WEIGHT: 152 LBS | HEART RATE: 69 BPM | HEIGHT: 60 IN | TEMPERATURE: 97 F | SYSTOLIC BLOOD PRESSURE: 120 MMHG | BODY MASS INDEX: 29.84 KG/M2 | DIASTOLIC BLOOD PRESSURE: 70 MMHG

## 2022-11-18 DIAGNOSIS — R00.0 TACHYCARDIA: ICD-10-CM

## 2022-11-18 DIAGNOSIS — E78.2 MIXED HYPERLIPIDEMIA: ICD-10-CM

## 2022-11-18 DIAGNOSIS — J45.909 MILD ASTHMA WITHOUT COMPLICATION, UNSPECIFIED WHETHER PERSISTENT: ICD-10-CM

## 2022-11-18 DIAGNOSIS — F41.1 GENERALIZED ANXIETY DISORDER: ICD-10-CM

## 2022-11-18 DIAGNOSIS — I25.10 ARTERIOSCLEROTIC CORONARY ARTERY DISEASE: ICD-10-CM

## 2022-11-18 DIAGNOSIS — I10 BENIGN ESSENTIAL HYPERTENSION: ICD-10-CM

## 2022-11-18 DIAGNOSIS — E03.9 HYPOTHYROIDISM, UNSPECIFIED TYPE: ICD-10-CM

## 2022-11-18 NOTE — PROGRESS NOTES
Progress Note - Cardiology Office  [facility]    Sherryle Ruby 65 y o  female MRN: [id number]  : 1945  Encounter: [id number]      Assessment:     1  Arteriosclerotic coronary artery disease    2  Benign essential hypertension    3  Tachycardia    4  Mixed hyperlipidemia    5  Generalized anxiety disorder    6  Mild asthma without complication, unspecified whether persistent    7  Hypothyroidism, unspecified type        Discussion Summary and Plan:    1  Benign essential hypertension  Her blood pressure is acceptable continue metoprolol XL she is on Toprol XL 25 mg daily  2  Coronary artery disease  Status post cardiac catheterization twice in  and 14 with mild nonobstructive coronary artery disease  Continue medical Rx with aspirin statin  No symptoms of angina    3  Dyslipidemia  She is on Lipitor 10 mg she get blood test with her primary care doctor  She is tolerating it very well  Continue statin    4  Hypothyroidism  On Synthroid  He is on levothyroxine monitor TSH    5  History of DJD/back pain and fibromyalgia  Currently she is having neck pain she had MRI done follow up with Neurology in medical MD      6  Anxiety  Patient was reassured that her symptoms of less likely to be cardiac in origin  Discussed with    7  Large hiatal hernia status post procedure by endoscopy for treatment  She follows with GI  Currently stable    Continue current Rx  Follow-up 6 months    Please call [phone], if any questions  Counseling :  A description of the counseling  Goals and Barriers  Patient's ability to self care: Yes  Medication side effect reviewed with patient in detail and all their questions answered to their satisfaction  HPI :     Sherryle Ruby is a 65y o  year old female who came for follow up   Patient has with a past medical history significant for mild nonobstructive coronary artery disease cardiac catheterization, dyslipidemia, back pain, asthma who came to see us for routine follow-up  Patient complains of some atypical chest pain which had one episode a few weeks ago with no relation to exercise  Patient is pretty active and walks every day with no symptoms of any chest pain or any shortness of breath  She has no fever no chills no nausea no vomiting no other significant complaint     03/19/2021  Above reviewed  Patient came for follow-up she is doing well  She has not wheezing at this time  Asthma is under control  She has a catheterization done in 2012 and 14 found to have mild nonobstructive disease  Since then she has no symptoms of chest pain today heart rate 63 beats per minute EKG shows no significant change  She has decently active  Her weight has remained stable around 140 lb  No nausea no fever no chills no PND no orthopnea no other issues  She does get easily anxious and then her heart rate goes faster she is taking antianxiety medication  11/18/2021  Above reviewed  Patient came for follow-up  She had history of nonobstructive coronary artery disease by catheterization in 2012 and 14, bronchial asthma, and history of anxiety  She came for follow-up when she gets anxious her heart rate goes faster  She is taking now anti anxiety medications  Her vitals has been stable  She has an echo done in November 2020 which shows normal LV systolic function, with mild LVH and no significant valvular disease  Lately she has some issues with her neck pain and she has MRI done and she follows with Neurology  History of short-term memory loss and difficulty sleeping otherwise no cardiovascular issues  No nausea no vomiting no PND no orthopnea  She came with her sisters  05/02/2022  Above reviewed  Patient came for follow-up she is doing well from cardiac point of view her main issue is her anxiety    She had a medical history significant nonobstructive disease by catheterization in 2012 in 2014, bronchial asthma, history of anxiety, dyslipidemia, hypothyroidism who has some exertional shortness of breath  She also history of bronchial asthma and she is using inhalers  She is not taking other inhalers as she feels she does not need them  She had an echo done in November 2020 which shows normal LV systolic function  She is struggling with her short-term memory  Otherwise no cardiovascular issues EKG shows normal sinus rhythm with no change from previous EKG no nausea no vomiting no fever no chills no PND  Has exertional shortness of breath which is chronic and not changed  11/18/2022  Above reviewed  Patient came for follow-up  She is doing well from cardiac point of view  Her medical history significant for nonobstructive by catheterization in 2012 and in 2014, bronchial asthma history of anxiety, dyslipidemia, hypothyroidism and some exertional shortness of breath  She does have history of exact bronchial asthma and uses DNR all the time  She has an echo done November 2022 which shows normal LV systolic function  As perhercomplaining her she is struggling with her short-term memory loss  Her EKG today shows sinus rhythm heart rate 69 beats per minute  No nausea no vomiting she does not feel any palpitations  She does feel she is more easily tired and fatigued  But as per family member she is very active and she need to slow down  Her previous cardiac workup has been acceptable  Review of Systems   Constitutional: Negative for activity change, chills, diaphoresis, fever and unexpected weight change  HENT: Negative for congestion  Eyes: Negative for discharge and redness  Respiratory: Positive for shortness of breath  Negative for cough, chest tightness and wheezing  Chronic exertion   Cardiovascular: Negative  Negative for chest pain, palpitations and leg swelling  Gastrointestinal: Negative for abdominal pain, diarrhea and nausea  Endocrine: Negative      Genitourinary: Negative for decreased urine volume and urgency  Musculoskeletal: Negative  Negative for arthralgias, back pain and gait problem  Skin: Negative for rash and wound  Allergic/Immunologic: Negative  Neurological: Negative for dizziness, seizures, syncope, weakness, light-headedness and headaches  Hematological: Negative  Psychiatric/Behavioral: Positive for sleep disturbance  Negative for agitation and confusion  The patient is nervous/anxious           Worsening short-term memory loss       Historical Information   Past Medical History:   Diagnosis Date   • Allergic rhinitis     Last Assessed:10/22/2014   • Alzheimer disease ([place] [address] )    • Angina pectoris ([place] [address] )    • Ankle fracture, right     casted   • Anxiety    • Arthritis    • Asthma    • Cisneros esophagus    • Bursitis    • Cervical herniated disc 04/04/2022   • Colon polyp    • Constipation    • Constipation    • COPD (chronic obstructive pulmonary disease) ([facility])    • Coronary artery disease    • Cystitis     chronic   • Dementia ([place] [address] ) 04/04/2022    short term memory loss   • Disease of thyroid gland     hypothyroid   • Diverticulosis    • Fasciculations    • Fibromyalgia    • Fibromyalgia, primary    • GERD (gastroesophageal reflux disease)    • H/o Lyme disease    • Heart murmur    • Hiatal hernia    • History of Clostridium difficile infection    • Hyper reflexia    • Hyperlipidemia    • Hypertension     on 4/4/22 pt denies having HTN   • Hypothyroid     hypo   • IBS (irritable bowel syndrome)    • Labral tear of shoulder     left   • Lichen sclerosus of female genitalia 2016   • Murmur    • Neck pain, chronic     gets steroid injections-none since late 2016   • Neuralgia    • Oral lichen planus    • Peritonitis ([place] [address] )    • Spinal stenosis    • Spinal stenosis    • Tachycardia 04/04/2022    at times   • Ulcer    • Ulcer of gastric fundus    • Vertigo    • Wears glasses    • Wears partial dentures     upper and lower     Past Surgical History:   Procedure Laterality Date   • ABDOMINAL SURGERY      exploratory-removal of appendix   • APPENDECTOMY     • CARDIAC CATHETERIZATION     • CARPAL TUNNEL RELEASE Bilateral    • CHOLECYSTECTOMY     • COLONOSCOPY      sigmoid diverticulosis,5mm rectal tubular adenoma5/06   • DILATION AND CURETTAGE OF UTERUS      x3 miscarriages   • FOOT SURGERY Right     5th toe-hammertoe repair   • FOOT SURGERY     • HIATAL HERNIA REPAIR     • HYSTERECTOMY  01/09/2012    complete ; for bleeding,tubal ligation,vaginal prolapse and rectocele repair   • OVARIAN CYST REMOVAL     • OH COLONOSCOPY FLX DX W/COLLJ SPEC WHEN PFRMD N/A 10/23/2017    Procedure: EGD AND COLONOSCOPY;  Surgeon: Payton Huizar MD;  Location: [facility];   Service: Gastroenterology   • OH SHLDR Juan A Hudson Left 11/13/2017    Procedure: SHOULDER ARTHROSCOPY WITH SUBACROMIAL DECOMPRESSION, ROTATOR CUFF REPAIR;  Surgeon: Louis Mercedes MD;  Location[facility] MAIN OR;  Service: Orthopedics   • ROTATOR CUFF REPAIR Right    • TONSILLECTOMY       Social History     Substance and Sexual Activity   Alcohol Use No    Comment: stopped drinking in 2012     Social History     Substance and Sexual Activity   Drug Use Never     Social History     Tobacco Use   Smoking Status Never   Smokeless Tobacco Never     Family History:   Family History   Problem Relation Age of Onset   • Cancer Mother         colon   • Ulcerative colitis Mother    • Alzheimer's disease Father    • Heart disease Sister         MI x4-angioplasty x4 stents   • Colonic polyp Sister    • Ulcerative colitis Sister    • Asthma Sister    • COPD Sister    • Cancer Brother         brain tumor-age 6   • Cancer Brother 72        prostate   • Arthritis Family    • Osteoarthritis Family    • Cancer Other         colon cancer   • Crohn's disease Neg Hx    • Liver cancer Neg Hx        Meds/Allergies     Allergies   Allergen Reactions   • Omnipaque [Iohexol] Other (See Comments)     Shakes, "passed out"   • Pneumovax 23 [Pneumococcal Vac Polyvalent] Hives   • Iodine - Food Allergy    • Iv Dye  [Iodinated Diagnostic Agents] Confusion     Annotation - [id number]: shaking   • Aspirin GI Intolerance and Nausea Only     Reaction Date: [id number]; Annotation - [id number]: 325mg causes bleeding  Cannot take a dose higher than 81mg-pt has a hx of ulcer   • Codeine GI Intolerance     N/V   • Flexeril [Cyclobenzaprine]      Unknown reaction per pt   Allergy was noted on physicians note   • Latex Rash   • Other Rash     Adhesive Tape-caused a rash       Current Outpatient Medications:   •  albuterol (ACCUNEB) 0 63 MG/3ML nebulizer solution, Take 1 ampule by nebulization every 6 (six) hours as needed for wheezing, Disp: , Rfl:   •  albuterol (PROVENTIL HFA,VENTOLIN HFA) 90 mcg/act inhaler, Inhale 2 puffs every 6 (six) hours as needed for wheezing , Disp: , Rfl:   •  atorvastatin (LIPITOR) 10 mg tablet, Take 10 mg by mouth daily , Disp: , Rfl:   •  cholecalciferol (VITAMIN D3) 1,000 units tablet, Take 1,000 Units by mouth daily  , Disp: , Rfl:   •  dexamethasone 0 5 mg/5 mL solution, RINSE WITH 1 TEASPOONFUL  FOR 2 MINUTES, 2-4 TIMES A DAY, THEN SPIT OUT, Disp: , Rfl:   •  donepezil (ARICEPT) 10 mg tablet, Take 10 mg by mouth daily at bedtime , Disp: , Rfl: 5  •  famotidine (PEPCID) 40 MG tablet, TAKE 1 TABLET (40 MG TOTAL) BY MOUTH DAILY AT BEDTIME, Disp: 30 tablet, Rfl: 1  •  ferrous sulfate 325 (65 FE) MG EC tablet, Take 1 tablet by mouth daily, Disp: , Rfl:   •  fluticasone-vilanterol (Breo Ellipta) 100-25 mcg/inh inhaler, Inhale 1 puff daily Rinse mouth after use   , Disp: , Rfl:   •  ipratropium (ATROVENT) 0 03 % nasal spray, USE 2 SPRAYS INTO EACH NOSTRIL EVERY 12 (TWELVE) HOURS, Disp: 30 mL, Rfl: 6  •  levothyroxine 100 mcg tablet, Take 100 mcg by mouth daily, Disp: , Rfl:   •  metoprolol succinate (TOPROL-XL) 25 mg 24 hr tablet, Take 1 tablet (25 mg total) by mouth daily, Disp: 90 tablet, Rfl: 3  •  Multiple Vitamins-Minerals (ALIVE WOMENS 50+ PO), Take by mouth  , Disp: , Rfl:   •  pantoprazole (PROTONIX) 40 mg tablet, TAKE ONE (1) TABLET BY MOUTH DAILY, Disp: 30 tablet, Rfl: 2  •  PARoxetine (PAXIL) 10 mg tablet, Take 10 mg by mouth daily , Disp: , Rfl:   •  QUEtiapine (SEROquel) 25 mg tablet, Take 25 mg by mouth daily at bedtime, Disp: , Rfl:   •  aspirin 81 mg chewable tablet, Chew 81 mg every morning   (Patient not taking: Reported on 5/2/2022), Disp: , Rfl:   •  Diclofenac Sodium (VOLTAREN) 1 %, Apply 2 g topically 4 (four) times a day (Patient not taking: Reported on 5/2/2022), Disp: 1 Tube, Rfl: 1  •  ondansetron (ZOFRAN) 4 mg tablet, Take 1 tablet (4 mg total) by mouth every 6 (six) hours for 12 days, Disp: 12 tablet, Rfl: 0  •  vitamin B-12 (VITAMIN B-12) 1,000 mcg tablet, Take 1,000 mcg by mouth daily  (Patient not taking: Reported on 5/2/2022), Disp: , Rfl:     Vitals: Blood pressure 120/70, pulse 69, temperature (!) 97 °F (36 1 °C), height 5' (1 524 m), weight 68 9 kg (152 lb), SpO2 96 %, not currently breastfeeding  ?  Body mass index is 29 69 kg/m²  Vitals:    11/18/22 1405   Weight: 68 9 kg (152 lb)     BP Readings from Last 3 Encounters:   11/18/22 120/70   05/02/22 140/80   04/05/22 145/76       Physical Exam  Constitutional:       General: She is not in acute distress  Appearance: She is well-developed  She is not diaphoretic  Neck:      Thyroid: No thyromegaly  Vascular: No JVD  Trachea: No tracheal deviation  Cardiovascular:      Rate and Rhythm: Normal rate and regular rhythm  Heart sounds: S1 normal and S2 normal  Heart sounds not distant  Murmur heard  Systolic (ejection) murmur is present with a grade of 2/6  No friction rub  No gallop  No S3 or S4 sounds  Pulmonary:      Effort: Pulmonary effort is normal  No respiratory distress  Breath sounds: Normal breath sounds  No wheezing or rales  Chest:      Chest wall: No tenderness     Abdominal:      General: Bowel sounds are normal  There is no distension  Palpations: Abdomen is soft  Tenderness: There is no abdominal tenderness  Musculoskeletal:         General: No deformity  Cervical back: Neck supple  Skin:     General: Skin is warm and dry  Coloration: Skin is not pale  Findings: No rash  Neurological:      Mental Status: She is alert and oriented to person, place, and time  Psychiatric:         Behavior: Behavior normal          Judgment: Judgment normal          Diagnostic Studies Review Cardio:    Stress Test: Nuclear stress test done in 2012 at by Dr Ruslan Thao shows mild to moderate inferior lateral ischemia EF was normal  Catheter done in 2012 and 14 shows no evidence of obstructive coronary artery disease  Echocardiogram/FACUNDO: Echo Doppler done in 2011 shows EF 60%, trace MR, trace TR  Echo Doppler done November 2020 shows normal LV systolic function mild LVH mild MR, trace TR PA pressure 30 mmHg  Catheterization: Cardiac catheterization done in August 2014 shows mild nonobstructive 25-30% stenosis with normal LV systolic function  was no different than cardiac catheter patient done in 2012  Vascular imaging: Vascular study done in April 2015 shows no significant ICA disease  ECG Report: Twelve-lead EKG done 4/7/2017 shows normal sinus heart rate 76 bpm  No cigarette ST changes  Twelve lead EKG done 01/03/2019 shows normal sinus rhythm heart rate 62 beats per minute  No significant ST changes  No change from old EKG    Twelve lead EKG done 07/03/2019 65 beats per minute normal sinus rhythm no significant ST changes no change from old it is normal EKG  Twelve lead EKG 07/03/2019 shows sinus tachycardia heart rate 102 beats per minute as compared to previous EKGs patient's heart rate is faster  Twelve lead EKG 09/30/2020 shows normal sinus rhythm heart rate 68 beats per minute no significant change from old EKG      Twelve lead EKG done 03/19/2021 shows normal sinus rhythm  Heart rate 63 beats per minute  a 12 lead EKG done on 11/18/2021 shows normal sinus rhythm, heart rate 66 beats per minute  Minimal voltage for LVH no change from previous EKG     Twelve lead EKG 05/02/2022 shows normal sinus rhythm heart rate 76 beats per minute no change from previous EKG  Twelve lead EKG done on 11/18/2022 shows normal sinus rhythm heart rate 69 beats per minute  Lab Review   Lab Results   Component Value Date    WBC 7 92 08/02/2020    HGB 12 5 08/02/2020    HCT 41 7 08/02/2020    MCV 77 (L) 08/02/2020    RDW 18 1 (H) 08/02/2020     08/02/2020     BMP:  Lab Results   Component Value Date    SODIUM 137 08/02/2020    K 4 1 08/02/2020     08/02/2020    CO2 24 08/02/2020    ANIONGAP 13 4 12/28/2015    BUN 9 08/02/2020    CREATININE 0 74 08/02/2020    GLUCOSE 98 12/28/2015    GLUF 108 (H) 03/13/2018    CALCIUM 9 0 08/02/2020    EGFR 80 08/02/2020     LFT:  Lab Results   Component Value Date    AST 35 08/02/2020    ALT 30 08/02/2020    ALKPHOS 115 08/02/2020    TP 8 0 08/02/2020    ALB 4 0 08/02/2020      Lab Results   Component Value Date    HPX2KOZOUHJJ 4 508 (H) 08/02/2020     Lipid Profile:   Lab Results   Component Value Date    CHOLESTEROL 170 03/13/2018    HDL 45 03/13/2018    LDLCALC 88 03/13/2018    TRIG 183 (H) 03/13/2018     Lab Results   Component Value Date    CHOLESTEROL 170 03/13/2018    CHOLESTEROL 150 07/07/2016     Lab Results   Component Value Date    CKTOTAL 77 10/06/2017     Last TSH 03/13/2008 was 1 6  Dr Jose Alberto Holliday MD [facility]      "This note has been constructed using a voice recognition system  Therefore there may be syntax, spelling, and/or grammatical errors   Please call if you have any questions  "

## 2022-11-22 DIAGNOSIS — K21.00 GASTROESOPHAGEAL REFLUX DISEASE WITH ESOPHAGITIS, UNSPECIFIED WHETHER HEMORRHAGE: ICD-10-CM

## 2022-11-23 RX ORDER — FAMOTIDINE 40 MG/1
40 TABLET, FILM COATED ORAL
Qty: 30 TABLET | Refills: 1 | Status: SHIPPED | OUTPATIENT
Start: 2022-11-23 | End: 2022-12-23

## 2022-12-23 ENCOUNTER — TELEPHONE (OUTPATIENT)
Dept: GASTROENTEROLOGY | Facility: CLINIC | Age: 77
End: 2022-12-23

## 2022-12-23 NOTE — TELEPHONE ENCOUNTER
Called pt trying to confirm pt's egd scheduled on 1/4/23 at [facility] with Dr Pardeep Mitchell, however, phone busy x2  Will call again either later today or next week to try to confirm

## 2022-12-27 DIAGNOSIS — K21.00 GASTROESOPHAGEAL REFLUX DISEASE WITH ESOPHAGITIS, UNSPECIFIED WHETHER HEMORRHAGE: ICD-10-CM

## 2022-12-28 RX ORDER — FAMOTIDINE 40 MG/1
40 TABLET, FILM COATED ORAL
Qty: 30 TABLET | Refills: 1 | Status: SHIPPED | OUTPATIENT
Start: 2022-12-28 | End: 2023-01-27

## 2022-12-29 ENCOUNTER — TELEPHONE (OUTPATIENT)
Dept: GASTROENTEROLOGY | Facility: CLINIC | Age: 77
End: 2022-12-29

## 2022-12-29 NOTE — TELEPHONE ENCOUNTER
----- Message from Arya Jiménez sent at 12/29/2022  9:33 AM EST -----  Regarding: cardiac clearance  Clearance needed

## 2022-12-29 NOTE — TELEPHONE ENCOUNTER
Dr Nikia Hollis    Our mutual patient is scheduled for procedure: EGD please advise if pt is cleared for procedure  Thank you so much! On: __1__/_4    _/_ 21   _      With:   __He_______    He/She is taking the following blood thinner:  None that aware of         Can this be stopped ____n/a__ days prior to the procedure?       Physician Approving clearance: ________________________

## 2022-12-29 NOTE — TELEPHONE ENCOUNTER
Called and spoke to pt and confirmed her procedure  She has instructions and did not have any questions

## 2022-12-30 ENCOUNTER — TELEPHONE (OUTPATIENT)
Dept: CARDIOLOGY CLINIC | Facility: CLINIC | Age: 77
End: 2022-12-30

## 2022-12-30 NOTE — TELEPHONE ENCOUNTER
Called Dr Katalina Talamantes office spoke to Yara whom informed she does see a note regarding this and will look into it and call me back  Will call again on Tue if do not hear back from anyone  Pt is scheduled on Wed 1/4/23

## 2023-01-03 RX ORDER — SODIUM CHLORIDE, SODIUM LACTATE, POTASSIUM CHLORIDE, CALCIUM CHLORIDE 600; 310; 30; 20 MG/100ML; MG/100ML; MG/100ML; MG/100ML
75 INJECTION, SOLUTION INTRAVENOUS CONTINUOUS
Status: CANCELLED | OUTPATIENT
Start: 2023-01-03

## 2023-01-04 ENCOUNTER — ANESTHESIA EVENT (OUTPATIENT)
Dept: GASTROENTEROLOGY | Facility: AMBULATORY SURGERY CENTER | Age: 78
End: 2023-01-04

## 2023-01-04 ENCOUNTER — HOSPITAL ENCOUNTER (OUTPATIENT)
Dept: GASTROENTEROLOGY | Facility: AMBULATORY SURGERY CENTER | Age: 78
Discharge: HOME | End: 2023-01-04

## 2023-01-04 ENCOUNTER — ANESTHESIA (OUTPATIENT)
Dept: GASTROENTEROLOGY | Facility: AMBULATORY SURGERY CENTER | Age: 78
End: 2023-01-04

## 2023-01-04 VITALS
BODY MASS INDEX: 29.84 KG/M2 | TEMPERATURE: 97.2 F | HEART RATE: 69 BPM | HEIGHT: 60 IN | WEIGHT: 152 LBS | OXYGEN SATURATION: 94 % | RESPIRATION RATE: 18 BRPM | DIASTOLIC BLOOD PRESSURE: 79 MMHG | SYSTOLIC BLOOD PRESSURE: 146 MMHG

## 2023-01-04 DIAGNOSIS — K21.00 GASTROESOPHAGEAL REFLUX DISEASE WITH ESOPHAGITIS, UNSPECIFIED WHETHER HEMORRHAGE: ICD-10-CM

## 2023-01-04 RX ORDER — PROPOFOL 10 MG/ML
INJECTION, EMULSION INTRAVENOUS AS NEEDED
Status: DISCONTINUED | OUTPATIENT
Start: 2023-01-04 | End: 2023-01-04

## 2023-01-04 RX ORDER — SODIUM CHLORIDE, SODIUM LACTATE, POTASSIUM CHLORIDE, CALCIUM CHLORIDE 600; 310; 30; 20 MG/100ML; MG/100ML; MG/100ML; MG/100ML
75 INJECTION, SOLUTION INTRAVENOUS CONTINUOUS
Status: DISCONTINUED | OUTPATIENT
Start: 2023-01-04 | End: 2023-01-08 | Stop reason: HOSPADM

## 2023-01-04 RX ORDER — SODIUM CHLORIDE, SODIUM LACTATE, POTASSIUM CHLORIDE, CALCIUM CHLORIDE 600; 310; 30; 20 MG/100ML; MG/100ML; MG/100ML; MG/100ML
INJECTION, SOLUTION INTRAVENOUS CONTINUOUS PRN
Status: DISCONTINUED | OUTPATIENT
Start: 2023-01-04 | End: 2023-01-04

## 2023-01-04 RX ORDER — LIDOCAINE HYDROCHLORIDE 10 MG/ML
INJECTION, SOLUTION EPIDURAL; INFILTRATION; INTRACAUDAL; PERINEURAL AS NEEDED
Status: DISCONTINUED | OUTPATIENT
Start: 2023-01-04 | End: 2023-01-04

## 2023-01-04 RX ADMIN — PROPOFOL 20 MG: 10 INJECTION, EMULSION INTRAVENOUS at 08:10

## 2023-01-04 RX ADMIN — SODIUM CHLORIDE, SODIUM LACTATE, POTASSIUM CHLORIDE, CALCIUM CHLORIDE: 600; 310; 30; 20 INJECTION, SOLUTION INTRAVENOUS at 07:52

## 2023-01-04 RX ADMIN — PROPOFOL 50 MG: 10 INJECTION, EMULSION INTRAVENOUS at 08:09

## 2023-01-04 RX ADMIN — PROPOFOL 20 MG: 10 INJECTION, EMULSION INTRAVENOUS at 08:12

## 2023-01-04 RX ADMIN — LIDOCAINE HYDROCHLORIDE 30 MG: 10 INJECTION, SOLUTION EPIDURAL; INFILTRATION; INTRACAUDAL; PERINEURAL at 08:09

## 2023-01-04 NOTE — H&P
History and Physical - [facility]  Sangeeta León 65 y o  female MRN: [id number]                  HPI: Sangeeta León is a 65y o  year old female who presents for GERD      REVIEW OF SYSTEMS: Per the HPI, and otherwise unremarkable  Historical Information   Past Medical History:   Diagnosis Date   • Allergic rhinitis     Last Assessed:10/22/2014   • Alzheimer disease ([place] [address] )    • Angina pectoris ([place] [address] )    • Ankle fracture, right     casted   • Anxiety    • Arthritis    • Asthma    • Cisneros esophagus    • Bursitis    • Cataracts, bilateral     scheduled for surgery   • Cervical herniated disc 04/04/2022   • Colon polyp    • Constipation    • Constipation    • COPD (chronic obstructive pulmonary disease) ([place] [address] )     does get SOB walking up stairs per patient-states she has phlegm today, cough 1/4/23   • Coronary artery disease    • Cystitis     chronic   • Dementia ([place] [address] ) 04/04/2022    short term memory loss   • Disease of thyroid gland     hypothyroid   • Diverticulosis    • Fasciculations    • Fibromyalgia    • Fibromyalgia, primary    • GERD (gastroesophageal reflux disease)     patient states she gets alot of heartburn-takes Protonix and Pepcid  Also has solid food regurgitation at times as well as liquids  1/4/23   • H/o Lyme disease    • Heart murmur    • Hiatal hernia    • History of Clostridium difficile infection    • Hyper reflexia    • Hyperlipidemia    • Hypertension     on 4/4/22 pt denies having HTN   • Hypoglycemia     patient states she feels well today regarding blood sugar  • Hypothyroid     hypo   • IBS (irritable bowel syndrome)    • Labral tear of shoulder     left   • Lichen sclerosus of female genitalia 2016   • Murmur    • Neck pain, chronic     gets steroid injections-none since late 2016   • Neuralgia    • Oral lichen planus    • Peritonitis ([place] [address] )    • Spinal stenosis     lumbar; has cervical pain as well at times    1/4/23   • Spinal stenosis    • Tachycardia 04/04/2022    at times   • Ulcer    • Ulcer of gastric fundus    • Vertigo    • Wears glasses    • Wears partial dentures     upper and lower     Past Surgical History:   Procedure Laterality Date   • ABDOMINAL SURGERY      exploratory-removal of appendix   • APPENDECTOMY     • CARDIAC CATHETERIZATION     • CARPAL TUNNEL RELEASE Bilateral    • CHOLECYSTECTOMY     • COLONOSCOPY      sigmoid diverticulosis,5mm rectal tubular adenoma5/06   • DILATION AND CURETTAGE OF UTERUS      x3 miscarriages   • EGD     • FOOT SURGERY Right     5th toe-hammertoe repair   • FOOT SURGERY     • HIATAL HERNIA REPAIR     • HYSTERECTOMY  01/09/2012    complete ; for bleeding,tubal ligation,vaginal prolapse and rectocele repair   • OVARIAN CYST REMOVAL     • IL COLONOSCOPY FLX DX W/COLLJ SPEC WHEN PFRMD N/A 10/23/2017    Procedure: EGD AND COLONOSCOPY;  Surgeon: Sherri Hicks MD;  Location: [facility];   Service: Gastroenterology   • IL SURGICAL ARTHROSCOPY WILLIAN W/CORACOACRM LIGM RLS Left 11/13/2017    Procedure: SHOULDER ARTHROSCOPY WITH SUBACROMIAL DECOMPRESSION, ROTATOR CUFF REPAIR;  Surgeon: Magnus Blair MD;  Location: [place] [address] MAIN OR;  Service: Orthopedics   • ROTATOR CUFF REPAIR Right    • TONSILLECTOMY       Social History   Social History     Substance and Sexual Activity   Alcohol Use No    Comment: stopped drinking in 2012     Social History     Substance and Sexual Activity   Drug Use Never     Social History     Tobacco Use   Smoking Status Never   Smokeless Tobacco Never     Family History   Problem Relation Age of Onset   • Cancer Mother         colon   • Ulcerative colitis Mother    • Alzheimer's disease Father    • Heart disease Sister         MI x4-angioplasty x4 stents   • Colonic polyp Sister    • Ulcerative colitis Sister    • Asthma Sister    • COPD Sister    • Cancer Brother         brain tumor-age 6   • Cancer Brother 72        prostate   • Arthritis Family    • Osteoarthritis Family    • Cancer Other colon cancer   • Crohn's disease Neg Hx    • Liver cancer Neg Hx        Meds/Allergies     (Not in a hospital admission)      Allergies   Allergen Reactions   • Omnipaque [Iohexol] Other (See Comments)     Shakes, "passed out"   • Pneumovax 23 [Pneumococcal Vac Polyvalent] Hives   • Iodine - Food Allergy    • Iv Dye  [Iodinated Contrast Media] Confusion     Annotation - [id number]: shaking   • Aspirin GI Intolerance and Nausea Only     Reaction Date: [id number]; Annotation - [id number]: 325mg causes bleeding  Cannot take a dose higher than 81mg-pt has a hx of ulcer   • Codeine GI Intolerance     N/V   • Flexeril [Cyclobenzaprine]      Unknown reaction per pt  Allergy was noted on physicians note   • Latex Rash   • Other Rash     Adhesive Tape-caused a rash       Objective     /73   Pulse 79   Temp (!) 97 2 °F (36 2 °C) (Skin)   Resp 18   Ht 5' (1 524 m)   Wt 68 9 kg (152 lb)   SpO2 96%   BMI 29 69 kg/m²       PHYSICAL EXAM    Gen: NAD  CV: RRR  CHEST: Clear  ABD: soft, NT/ND  EXT: no edema      ASSESSMENT/PLAN:  This is a 65y o  year old female here for EGD, and she is stable and optimized for her procedure

## 2023-01-04 NOTE — ANESTHESIA POSTPROCEDURE EVALUATION
Post-Op Assessment Note    CV Status:  Stable  Pain Score: 0    Pain management: adequate     Mental Status:  Alert and awake   Hydration Status:  Euvolemic   PONV Controlled:  Controlled   Airway Patency:  Patent      Post Op Vitals Reviewed: Yes      Staff: CRNA, Anesthesiologist         No notable events documented      BP   139/70   Temp  98 6   Pulse  70   Resp   17   SpO2   98

## 2023-01-04 NOTE — ANESTHESIA PREPROCEDURE EVALUATION
Procedure:  EGD    Relevant Problems   CARDIO   (+) Arteriosclerotic coronary artery disease   (+) Atypical chest pain   (+) Benign essential hypertension   (+) HLD (hyperlipidemia)      ENDO   (+) Hypothyroidism      GI/HEPATIC   (+) Gastroesophageal reflux disease   (+) Hiatal hernia status post EsophyX TIF      MUSCULOSKELETAL  hardware right shoulder   (+) Chronic right-sided low back pain without sciatica   (+) Primary osteoarthritis of one hip, right   (+) Sacroiliitis (HCC)      NEURO/PSYCH   (+) Alzheimer disease (HCC)   (+) Anxiety disorder   (+) Chronic right-sided low back pain without sciatica      PULMONARY   (+) Asthma   (+) Chronic obstructive pulmonary disease (HCC)      Digestive   (+) Cisneros esophagus   (+) IBS (irritable bowel syndrome)      Endocrine   (+) Hypoglycemia        Physical Exam    Airway    Mallampati score: II  TM Distance: >3 FB  Neck ROM: full     Dental       Cardiovascular  Rhythm: regular, Rate: normal,     Pulmonary  Breath sounds clear to auscultation,     Other Findings        Anesthesia Plan  ASA Score- 2     Anesthesia Type- IV sedation with anesthesia with ASA Monitors  Additional Monitors:   Airway Plan:           Plan Factors-    Chart reviewed  Patient is not a current smoker  Induction- intravenous  Postoperative Plan-     Informed Consent- Anesthetic plan and risks discussed with patient  I personally reviewed this patient with the CRNA  Discussed and agreed on the Anesthesia Plan with the SHWETHA Martínez

## 2023-02-16 DIAGNOSIS — K21.00 GASTROESOPHAGEAL REFLUX DISEASE WITH ESOPHAGITIS, UNSPECIFIED WHETHER HEMORRHAGE: ICD-10-CM

## 2023-02-16 RX ORDER — FAMOTIDINE 40 MG/1
40 TABLET, FILM COATED ORAL
Qty: 30 TABLET | Refills: 1 | Status: SHIPPED | OUTPATIENT
Start: 2023-02-16 | End: 2023-03-18

## 2023-02-28 DIAGNOSIS — K21.00 GASTROESOPHAGEAL REFLUX DISEASE WITH ESOPHAGITIS, UNSPECIFIED WHETHER HEMORRHAGE: Primary | ICD-10-CM

## 2023-02-28 DIAGNOSIS — R14.0 ABDOMINAL BLOATING: ICD-10-CM

## 2023-03-21 DIAGNOSIS — R00.0 TACHYCARDIA: ICD-10-CM

## 2023-03-21 RX ORDER — METOPROLOL SUCCINATE 25 MG/1
25 TABLET, EXTENDED RELEASE ORAL DAILY
Qty: 90 TABLET | Refills: 3 | Status: SHIPPED | OUTPATIENT
Start: 2023-03-21

## 2023-03-28 DIAGNOSIS — J30.0 VASOMOTOR RHINITIS: ICD-10-CM

## 2023-03-28 RX ORDER — IPRATROPIUM BROMIDE 21 UG/1
SPRAY, METERED NASAL
Qty: 30 ML | Refills: 6 | Status: SHIPPED | OUTPATIENT
Start: 2023-03-28

## 2023-04-04 ENCOUNTER — HOSPITAL ENCOUNTER (OUTPATIENT)
Dept: NON INVASIVE DIAGNOSTICS | Facility: CLINIC | Age: 78
Discharge: HOME | End: 2023-04-04

## 2023-04-04 DIAGNOSIS — K21.00 GASTROESOPHAGEAL REFLUX DISEASE WITH ESOPHAGITIS, UNSPECIFIED WHETHER HEMORRHAGE: ICD-10-CM

## 2023-04-04 DIAGNOSIS — R14.0 ABDOMINAL BLOATING: ICD-10-CM

## 2023-05-05 ENCOUNTER — OFFICE VISIT (OUTPATIENT)
Dept: CARDIOLOGY CLINIC | Facility: CLINIC | Age: 78
End: 2023-05-05

## 2023-05-05 VITALS
DIASTOLIC BLOOD PRESSURE: 70 MMHG | OXYGEN SATURATION: 98 % | BODY MASS INDEX: 28.27 KG/M2 | HEART RATE: 68 BPM | HEIGHT: 60 IN | WEIGHT: 144 LBS | SYSTOLIC BLOOD PRESSURE: 110 MMHG

## 2023-05-05 DIAGNOSIS — I25.10 ARTERIOSCLEROTIC CORONARY ARTERY DISEASE: ICD-10-CM

## 2023-05-05 DIAGNOSIS — E03.9 HYPOTHYROIDISM, UNSPECIFIED TYPE: ICD-10-CM

## 2023-05-05 DIAGNOSIS — J45.909 MILD ASTHMA WITHOUT COMPLICATION, UNSPECIFIED WHETHER PERSISTENT: ICD-10-CM

## 2023-05-05 DIAGNOSIS — I10 BENIGN ESSENTIAL HYPERTENSION: ICD-10-CM

## 2023-05-05 DIAGNOSIS — E78.2 MIXED HYPERLIPIDEMIA: ICD-10-CM

## 2023-05-05 DIAGNOSIS — F41.1 GENERALIZED ANXIETY DISORDER: ICD-10-CM

## 2023-05-05 DIAGNOSIS — R00.0 TACHYCARDIA: ICD-10-CM

## 2023-05-05 NOTE — PROGRESS NOTES
Progress Note - Cardiology Office  [facility]    Mary Kay Aguilar 65 y o  female MRN: [id number]  : 1945  Encounter: [id number]      Assessment:     1  Arteriosclerotic coronary artery disease    2  Benign essential hypertension    3  Mixed hyperlipidemia    4  Tachycardia    5  Generalized anxiety disorder    6  Mild asthma without complication, unspecified whether persistent    7  Hypothyroidism, unspecified type        Discussion Summary and Plan:    1  Benign essential hypertension  Her blood pressure is acceptable continue metoprolol XL she is on Toprol XL 25 mg daily  Continue same medications  2  Coronary artery disease  Status post cardiac catheterization twice in  and 14 with mild nonobstructive coronary artery disease  Continue medical Rx with aspirin statin  No symptoms of angina as she is decently active  3  Dyslipidemia  She is on Lipitor 10 mg she get blood test with her primary care doctor  She is tolerating it very well  Used to get blood test with her primary care doctor she is scheduled to follow-up with Dr Jael Rodriguez  4  Hypothyroidism  On Synthroid  He is on levothyroxine monitor TSH    5  History of DJD/back pain and fibromyalgia  Currently she is having neck pain she had MRI done follow up with Neurology in medical MD      6  Anxiety  Patient was reassured that her symptoms of less likely to be cardiac in origin  Discussed with    7  Large hiatal hernia status post procedure by endoscopy for treatment  She follows with GI  Currently stable currently no issues    Continue current Rx  Follow-up 6 months    Please call [phone], if any questions  Counseling :  A description of the counseling  Goals and Barriers  Patient's ability to self care: Yes  Medication side effect reviewed with patient in detail and all their questions answered to their satisfaction      HPI :     Mary Kay Aguilar is a 65y o  year old female who came for follow up  Patient has with a past medical history significant for mild nonobstructive coronary artery disease cardiac catheterization, dyslipidemia, back pain, asthma who came to see us for routine follow-up  Patient complains of some atypical chest pain which had one episode a few weeks ago with no relation to exercise  Patient is pretty active and walks every day with no symptoms of any chest pain or any shortness of breath  She has no fever no chills no nausea no vomiting no other significant complaint     03/19/2021  Above reviewed  Patient came for follow-up she is doing well  She has not wheezing at this time  Asthma is under control  She has a catheterization done in 2012 and 14 found to have mild nonobstructive disease  Since then she has no symptoms of chest pain today heart rate 63 beats per minute EKG shows no significant change  She has decently active  Her weight has remained stable around 140 lb  No nausea no fever no chills no PND no orthopnea no other issues  She does get easily anxious and then her heart rate goes faster she is taking antianxiety medication  5/5/2023  Labs reviewed  Patient came for follow-up she is doing well from cardiac point of view  She had a history of nonobstructive coronary arteries by cath in 2012 and 2014, bronchial asthma but did not need to use inhalers, dyslipidemia, hypothyroidism, anxiety and some exertional shortness of breath  She was trying hard she lost about 8 pounds she feels well Echo Doppler in November 2020 shows normal LV systolic function  She is still struggling with her memory loss  Her EKG shows normal sinus some heart rate 68 bpm no other cardiovascular issues  She is trying hard and she has lost some weight  Her BMI now around 28    Review of Systems   Constitutional: Negative for activity change, chills, diaphoresis, fever and unexpected weight change  HENT: Negative for congestion  Eyes: Negative for discharge and redness  Respiratory: Negative for cough, chest tightness, shortness of breath and wheezing  Cardiovascular: Negative  Negative for chest pain, palpitations and leg swelling  Gastrointestinal: Negative for abdominal pain, diarrhea and nausea  Endocrine: Negative  Genitourinary: Negative for decreased urine volume and urgency  Musculoskeletal: Negative  Negative for arthralgias, back pain and gait problem  Skin: Negative for rash and wound  Allergic/Immunologic: Negative  Neurological: Negative for dizziness, seizures, syncope, weakness, light-headedness and headaches  Hematological: Negative  Psychiatric/Behavioral: Negative for agitation and confusion  The patient is not nervous/anxious  Memory loss       Historical Information   Past Medical History:   Diagnosis Date    Allergic rhinitis     Last Assessed:10/22/2014    Alzheimer disease [facility] [address] )     Angina pectoris ([facility] [address] )     Ankle fracture, right     casted    Anxiety     Arthritis     Asthma     Cisneros esophagus     Bursitis     Cataracts, bilateral     scheduled for surgery    Cervical herniated disc 04/04/2022    Colon polyp     Constipation     Constipation     COPD [facility]chronic obstructive pulmonary disease) [facility])     does get SOB walking up stairs per patient-states she has phlegm today, cough 1/4/23    Coronary artery disease     Cystitis     chronic    Dementia ([facility] [address] ) 04/04/2022    short term memory loss    Disease of thyroid gland     hypothyroid    Diverticulosis     Fasciculations     Fibromyalgia     Fibromyalgia, primary     GERD (gastroesophageal reflux disease)     patient states she gets alot of heartburn-takes Protonix and Pepcid  Also has solid food regurgitation at times as well as liquids    1/4/23    H/o Lyme disease     Heart murmur     Hiatal hernia     History of Clostridium difficile infection     Hyper reflexia     Hyperlipidemia     Hypertension     on 4/4/22 pt denies having HTN    Hypoglycemia     patient states she feels well today regarding blood sugar   Hypothyroid     hypo    IBS (irritable bowel syndrome)     Labral tear of shoulder     left    Lichen sclerosus of female genitalia 2016    Murmur     Neck pain, chronic     gets steroid injections-none since late 2016    Neuralgia     Oral lichen planus     Peritonitis ([address] )     Spinal stenosis     lumbar; has cervical pain as well at times  1/4/23    Spinal stenosis     Tachycardia 04/04/2022    at times    Ulcer     Ulcer of gastric fundus     Vertigo     Wears glasses     Wears partial dentures     upper and lower     Past Surgical History:   Procedure Laterality Date    ABDOMINAL SURGERY      exploratory-removal of appendix    APPENDECTOMY      CARDIAC CATHETERIZATION      CARPAL TUNNEL RELEASE Bilateral     CHOLECYSTECTOMY      COLONOSCOPY      sigmoid diverticulosis,5mm rectal tubular adenoma5/06    DILATION AND CURETTAGE OF UTERUS      x3 miscarriages    EGD      FOOT SURGERY Right     5th toe-hammertoe repair    FOOT SURGERY      HIATAL HERNIA REPAIR      HYSTERECTOMY  01/09/2012    complete ; for bleeding,tubal ligation,vaginal prolapse and rectocele repair    OVARIAN CYST REMOVAL      CT COLONOSCOPY FLX DX W/COLLJ SPEC WHEN PFRMD N/A 10/23/2017    Procedure: EGD AND COLONOSCOPY;  Surgeon: Ashley Key MD;  Location: [facility];   Service: Gastroenterology    CT SURGICAL ARTHROSCOPY WILLIAN W/CORACOACRM LIGM RLS Left 11/13/2017    Procedure: SHOULDER ARTHROSCOPY WITH SUBACROMIAL DECOMPRESSION, ROTATOR CUFF REPAIR;  Surgeon: Tonya Martins MD;  Location: [facility] OR;  Service: Orthopedics    ROTATOR CUFF REPAIR Right     TONSILLECTOMY       Social History     Substance and Sexual Activity   Alcohol Use No    Comment: stopped drinking in 2012     Social History     Substance and Sexual Activity   Drug Use Never     Social History     Tobacco Use   Smoking Status Never   Smokeless Tobacco "Never     Family History:   Family History   Problem Relation Age of Onset    Cancer Mother         colon    Ulcerative colitis Mother     Alzheimer's disease Father     Heart disease Sister         MI x4-angioplasty x4 stents    Colonic polyp Sister     Ulcerative colitis Sister     Asthma Sister     COPD Sister     Cancer Brother         brain tumor-age 6    Cancer Brother 72        prostate    Arthritis Family     Osteoarthritis Family     Cancer Other         colon cancer    Crohn's disease Neg Hx     Liver cancer Neg Hx        Meds/Allergies     Allergies   Allergen Reactions    Omnipaque [Iohexol] Other (See Comments)     Shakes, \"passed out\"    Pneumovax 23 [Pneumococcal Vac Polyvalent] Hives    Iodine - Food Allergy     Iv Dye  [Iodinated Contrast Media] Confusion     Annotation - [id number]: shaking    Aspirin GI Intolerance and Nausea Only     Reaction Date: [id number]; Annotation - [id number]: 325mg causes bleeding  Cannot take a dose higher than 81mg-pt has a hx of ulcer    Codeine GI Intolerance     N/V    Flexeril [Cyclobenzaprine]      Unknown reaction per pt   Allergy was noted on physicians note    Latex Rash    Other Rash     Adhesive Tape-caused a rash       Current Outpatient Medications:     atorvastatin (LIPITOR) 10 mg tablet, Take 10 mg by mouth daily , Disp: , Rfl:     donepezil (ARICEPT) 10 mg tablet, Take 10 mg by mouth daily at bedtime , Disp: , Rfl: 5    famotidine (PEPCID) 40 MG tablet, TAKE 1 TABLET (40 MG TOTAL) BY MOUTH DAILY AT BEDTIME, Disp: 30 tablet, Rfl: 1    ferrous sulfate 325 (65 FE) MG EC tablet, Take 1 tablet by mouth daily, Disp: , Rfl:     levothyroxine 100 mcg tablet, Take 100 mcg by mouth daily, Disp: , Rfl:     metoprolol succinate (TOPROL-XL) 25 mg 24 hr tablet, TAKE 1 TABLET (25 MG TOTAL) BY MOUTH DAILY, Disp: 90 tablet, Rfl: 3    pantoprazole (PROTONIX) 40 mg tablet, TAKE ONE (1) TABLET BY MOUTH DAILY, Disp: 30 tablet, Rfl: 2    " PARoxetine (PAXIL) 10 mg tablet, Take 10 mg by mouth daily , Disp: , Rfl:     QUEtiapine (SEROquel) 25 mg tablet, Take 25 mg by mouth daily at bedtime, Disp: , Rfl:     ondansetron (ZOFRAN) 4 mg tablet, Take 1 tablet (4 mg total) by mouth every 6 (six) hours for 12 days, Disp: 12 tablet, Rfl: 0    Vitals: Blood pressure 110/70, pulse 68, height 5' (1 524 m), weight 65 3 kg (144 lb), SpO2 98 %, not currently breastfeeding  ?  Body mass index is 28 12 kg/m²  Vitals:    05/05/23 1038   Weight: 65 3 kg (144 lb)     BP Readings from Last 3 Encounters:   05/05/23 110/70   01/04/23 146/79   11/18/22 120/70       Physical Exam  Constitutional:       General: She is not in acute distress  Appearance: She is well-developed  She is not diaphoretic  Neck:      Thyroid: No thyromegaly  Vascular: No JVD  Trachea: No tracheal deviation  Cardiovascular:      Rate and Rhythm: Normal rate and regular rhythm  Heart sounds: S1 normal and S2 normal  Heart sounds not distant  Murmur heard  Systolic (ejection) murmur is present with a grade of 2/6  No friction rub  No gallop  No S3 or S4 sounds  Pulmonary:      Effort: Pulmonary effort is normal  No respiratory distress  Breath sounds: Normal breath sounds  No wheezing or rales  Chest:      Chest wall: No tenderness  Abdominal:      General: Bowel sounds are normal  There is no distension  Palpations: Abdomen is soft  Tenderness: There is no abdominal tenderness  Musculoskeletal:         General: No deformity  Cervical back: Neck supple  Skin:     General: Skin is warm and dry  Coloration: Skin is not pale  Findings: No rash  Neurological:      Mental Status: She is alert and oriented to person, place, and time     Psychiatric:         Behavior: Behavior normal          Judgment: Judgment normal          Diagnostic Studies Review Cardio:    Stress Test: Nuclear stress test done in 2012 at by Dr Yovanny Abreu shows mild to moderate inferior lateral ischemia EF was normal  Catheter done in 2012 and 14 shows no evidence of obstructive coronary artery disease  Echocardiogram/FACUNDO: Echo Doppler done in 2011 shows EF 60%, trace MR, trace TR  Echo Doppler done November 2020 shows normal LV systolic function mild LVH mild MR, trace TR PA pressure 30 mmHg  Catheterization: Cardiac catheterization done in August 2014 shows mild nonobstructive 25-30% stenosis with normal LV systolic function  was no different than cardiac catheter patient done in 2012  Vascular imaging: Vascular study done in April 2015 shows no significant ICA disease  ECG Report: Twelve-lead EKG done 4/7/2017 shows normal sinus heart rate 76 bpm  No cigarette ST changes  Twelve lead EKG done 01/03/2019 shows normal sinus rhythm heart rate 62 beats per minute  No significant ST changes  No change from old EKG    Twelve lead EKG done 07/03/2019 65 beats per minute normal sinus rhythm no significant ST changes no change from old it is normal EKG  Twelve lead EKG 07/03/2019 shows sinus tachycardia heart rate 102 beats per minute as compared to previous EKGs patient's heart rate is faster  Twelve lead EKG 09/30/2020 shows normal sinus rhythm heart rate 68 beats per minute no significant change from old EKG  Twelve lead EKG done 03/19/2021 shows normal sinus rhythm  Heart rate 63 beats per minute  a 12 lead EKG done on 11/18/2021 shows normal sinus rhythm, heart rate 66 beats per minute  Minimal voltage for LVH no change from previous EKG     Twelve lead EKG 05/02/2022 shows normal sinus rhythm heart rate 76 beats per minute no change from previous EKG  Twelve lead EKG done on 11/18/2022 shows normal sinus rhythm heart rate 69 beats per minute      Twelve-lead EKG 5/5/2023 shows normal sinus some heart rate 68 bpm     Lab Review   Lab Results   Component Value Date    WBC 7 92 08/02/2020    HGB 12 5 08/02/2020    HCT 41 7 08/02/2020 "MCV 77 (L) 08/02/2020    RDW 18 1 (H) 08/02/2020     08/02/2020     BMP:  Lab Results   Component Value Date    SODIUM 137 08/02/2020    K 4 1 08/02/2020     08/02/2020    CO2 24 08/02/2020    ANIONGAP 13 4 12/28/2015    BUN 9 08/02/2020    CREATININE 0 74 08/02/2020    GLUCOSE 98 12/28/2015    GLUF 108 (H) 03/13/2018    CALCIUM 9 0 08/02/2020    EGFR 80 08/02/2020     LFT:  Lab Results   Component Value Date    AST 35 08/02/2020    ALT 30 08/02/2020    ALKPHOS 115 08/02/2020    TP 8 0 08/02/2020    ALB 4 0 08/02/2020      Lab Results   Component Value Date    GOL9WBITJCYT 4 508 (H) 08/02/2020     Lipid Profile:   Lab Results   Component Value Date    CHOLESTEROL 170 03/13/2018    HDL 45 03/13/2018    LDLCALC 88 03/13/2018    TRIG 183 (H) 03/13/2018     Lab Results   Component Value Date    CHOLESTEROL 170 03/13/2018    CHOLESTEROL 150 07/07/2016     Lab Results   Component Value Date    CKTOTAL 77 10/06/2017     Last TSH 03/13/2008 was 1 6  Dr Vania Tesfaye MD [facility]      \"This note has been constructed using a voice recognition system  Therefore there may be syntax, spelling, and/or grammatical errors  Please call if you have any questions   \"  "

## 2023-07-14 ENCOUNTER — OFFICE VISIT (OUTPATIENT)
Dept: GASTROENTEROLOGY | Facility: CLINIC | Age: 78
End: 2023-07-14
Payer: MEDICARE

## 2023-07-14 ENCOUNTER — TELEPHONE (OUTPATIENT)
Dept: GASTROENTEROLOGY | Facility: CLINIC | Age: 78
End: 2023-07-14

## 2023-07-14 VITALS
SYSTOLIC BLOOD PRESSURE: 148 MMHG | DIASTOLIC BLOOD PRESSURE: 78 MMHG | HEART RATE: 82 BPM | BODY MASS INDEX: 28.86 KG/M2 | WEIGHT: 147 LBS | HEIGHT: 60 IN

## 2023-07-14 DIAGNOSIS — K21.9 GASTROESOPHAGEAL REFLUX DISEASE WITHOUT ESOPHAGITIS: ICD-10-CM

## 2023-07-14 DIAGNOSIS — R10.13 EPIGASTRIC PAIN: ICD-10-CM

## 2023-07-14 DIAGNOSIS — K31.84 GASTROPARESIS: Primary | ICD-10-CM

## 2023-07-14 PROCEDURE — 99214 OFFICE O/P EST MOD 30 MIN: CPT | Performed by: INTERNAL MEDICINE

## 2023-07-14 NOTE — PROGRESS NOTES
[facility] - Outpatient Follow-up Note  Sina Hernandez 65 y.o. female MRN: [id number]  Encounter: [id number]          ASSESSMENT AND PLAN:      1. Gastroparesis  Last endoscopy showed large amount of solid food in the stomach, subsequently gastric emptying study was done which came back with abnormal.  After 4-hour only 27% of the food emptied stomach which suggest severe gastroparesis, she denying any history of diabetes mellitus, she denying any nausea or vomiting, her gastroparesis could be related to Seroquel, advised to see psychiatrist to consider changing medication. We will schedule for repeat EGD, will hold off for  Reglan or any motility agent  - EGD; Future    2. Gastroesophageal reflux disease without esophagitis  Continue with Protonix 40 mg p.o. every morning and famotidine 40 mg p.o. nightly  - EGD; Future    3. Epigastric pain  Postprandial abdominal pain secondary to gastroparesis, advised for gastroparesis diet which is small meal, low fiber and low-fat in the diet  ______________________________________________________________________    SUBJECTIVE: Patient seen and examined, she come for follow-up. She is complaining on and up upper abdominal pain which is crampy in nature, she denying any heartburn, no reflux symptoms, no nausea or vomiting, her reflux symptoms are well controlled with pantoprazole in the morning and famotidine at nighttime, her last endoscopy was suboptimal visualization because of large amount of solid food in the stomach, subsequently gastric emptying study was done which came back abnormal, severe gastroparesis, she is asking multiple question about gastroparesis, it could be from psychiatric medication, Seroquel which interfere with gastric motility, she denying any history of diabetes mellitus, no history of chronic kidney disease or liver problems.   She is non-smoker, no chronic alcohol use      REVIEW OF SYSTEMS IS OTHERWISE NEGATIVE. Historical Information   Past Medical History:   Diagnosis Date   • Allergic rhinitis     Last Assessed:10/22/2014   • Alzheimer disease ([address])    • Angina pectoris ([address])    • Ankle fracture, right     casted   • Anxiety    • Arthritis    • Asthma    • Cisneros esophagus    • Bursitis    • Cataracts, bilateral     scheduled for surgery   • Cervical herniated disc 04/04/2022   • Colon polyp    • Constipation    • Constipation    • COPD (chronic obstructive pulmonary disease) ([address])     does get SOB walking up stairs per patient-states she has phlegm today, cough 1/4/23   • Coronary artery disease    • Cystitis     chronic   • Dementia ([address]) 04/04/2022    short term memory loss   • Disease of thyroid gland     hypothyroid   • Diverticulosis    • Fasciculations    • Fibromyalgia    • Fibromyalgia, primary    • GERD (gastroesophageal reflux disease)     patient states she gets alot of heartburn-takes Protonix and Pepcid. Also has solid food regurgitation at times as well as liquids. 1/4/23   • H/o Lyme disease    • Heart murmur    • Hiatal hernia    • History of Clostridium difficile infection    • Hyper reflexia    • Hyperlipidemia    • Hypertension     on 4/4/22 pt denies having HTN   • Hypoglycemia     patient states she feels well today regarding blood sugar. • Hypothyroid     hypo   • IBS (irritable bowel syndrome)    • Labral tear of shoulder     left   • Lichen sclerosus of female genitalia 2016   • Murmur    • Neck pain, chronic     gets steroid injections-none since late 2016   • Neuralgia    • Oral lichen planus    • Peritonitis ([address])    • Spinal stenosis     lumbar; has cervical pain as well at times.   1/4/23   • Spinal stenosis    • Tachycardia 04/04/2022    at times   • Ulcer    • Ulcer of gastric fundus    • Vertigo    • Wears glasses    • Wears partial dentures     upper and lower     Past Surgical History:   Procedure Laterality Date   • ABDOMINAL SURGERY      exploratory-removal of appendix   • APPENDECTOMY     • CARDIAC CATHETERIZATION     • CARPAL TUNNEL RELEASE Bilateral    • CHOLECYSTECTOMY     • COLONOSCOPY      sigmoid diverticulosis,5mm rectal tubular adenoma5/06   • DILATION AND CURETTAGE OF UTERUS      x3 miscarriages   • EGD     • FOOT SURGERY Right     5th toe-hammertoe repair   • FOOT SURGERY     • HIATAL HERNIA REPAIR     • HYSTERECTOMY  01/09/2012    complete ; for bleeding,tubal ligation,vaginal prolapse and rectocele repair   • OVARIAN CYST REMOVAL     • DE COLONOSCOPY FLX DX W/COLLJ SPEC WHEN PFRMD N/A 10/23/2017    Procedure: EGD AND COLONOSCOPY;  Surgeon: Wu Clarke MD;  Location: [facility] GI LAB;   Service: Gastroenterology   • DE SURGICAL ARTHROSCOPY WILLIAN W/CORACOACRM LIGM RLS Left 11/13/2017    Procedure: SHOULDER ARTHROSCOPY WITH SUBACROMIAL DECOMPRESSION, ROTATOR CUFF REPAIR;  Surgeon: Beatris Cogan, MD;  Location: [facility] MAIN OR;  Service: Orthopedics   • ROTATOR CUFF REPAIR Right    • TONSILLECTOMY       Social History   Social History     Substance and Sexual Activity   Alcohol Use No    Comment: stopped drinking in 2012     Social History     Substance and Sexual Activity   Drug Use Never     Social History     Tobacco Use   Smoking Status Never   Smokeless Tobacco Never     Family History   Problem Relation Age of Onset   • Cancer Mother         colon   • Ulcerative colitis Mother    • Alzheimer's disease Father    • Heart disease Sister         MI x4-angioplasty x4 stents   • Colonic polyp Sister    • Ulcerative colitis Sister    • Asthma Sister    • COPD Sister    • Cancer Brother         brain tumor-age 6   • Cancer Brother 72        prostate   • Arthritis Family    • Osteoarthritis Family    • Cancer Other         colon cancer   • Crohn's disease Neg Hx    • Liver cancer Neg Hx        Meds/Allergies       Current Outpatient Medications:   •  atorvastatin (LIPITOR) 10 mg tablet  •  donepezil (ARICEPT) 10 mg tablet  •  famotidine (PEPCID) 40 MG tablet  • ferrous sulfate 325 (65 FE) MG EC tablet  •  levothyroxine 100 mcg tablet  •  metoprolol succinate (TOPROL-XL) 25 mg 24 hr tablet  •  pantoprazole (PROTONIX) 40 mg tablet  •  PARoxetine (PAXIL) 10 mg tablet  •  QUEtiapine (SEROquel) 25 mg tablet  •  ondansetron (ZOFRAN) 4 mg tablet    Allergies   Allergen Reactions   • Omnipaque [Iohexol] Other (See Comments)     Shakes, "passed out"   • Pneumovax 23 [Pneumococcal Vac Polyvalent] Hives   • Iodine - Food Allergy    • Iv Dye  [Iodinated Contrast Media] Confusion     Annotation - [id number]: shaking   • Aspirin GI Intolerance and Nausea Only     Reaction Date: [id number]; Annotation - [id number]: 325mg causes bleeding  Cannot take a dose higher than 81mg-pt has a hx of ulcer   • Codeine GI Intolerance     N/V   • Flexeril [Cyclobenzaprine]      Unknown reaction per pt. Allergy was noted on physicians note   • Latex Rash   • Other Rash     Adhesive Tape-caused a rash           Objective     Blood pressure 148/78, pulse 82, height 5' (1.524 m), weight 66.7 kg (147 lb), not currently breastfeeding. Body mass index is 28.71 kg/m². PHYSICAL EXAM:      General Appearance:   Alert, cooperative, no distress   HEENT:   Normocephalic, atraumatic, anicteric.     Neck:  Supple, symmetrical, trachea midline   Lungs:   Clear to auscultation bilaterally; no rales, rhonchi or wheezing; respirations unlabored    Heart[de-identified]   Regular rate and rhythm; no murmur, rub, or gallop. Abdomen:   Soft, non-tender, non-distended; normal bowel sounds; no masses, no organomegaly    Genitalia:   Deferred    Rectal:   Deferred    Extremities:  No cyanosis, clubbing or edema    Pulses:  2+ and symmetric    Skin:  No jaundice, rashes, or lesions    Lymph nodes:  No palpable cervical lymphadenopathy        Lab Results:   No visits with results within 1 Day(s) from this visit.    Latest known visit with results is:   Hospital Outpatient Visit on 01/04/2023   Component Date Value   • Case Report 01/04/2023                      Value:Surgical Pathology Report                         Case: [id number]                                   Authorizing Provider:  Patti Whitaker MD Collected:           01/04/2023 0813              Ordering Location:     [facility] Received:            01/04/2023 [address]                                                                  Pathologist:           Geremias Wesley MD                                                             Specimens:   A) - Stomach, cold bx Antrum erythema check for hpy                                                 B) - Esophagus, cold bx Lower esophagus reflux                                            • Final Diagnosis 01/04/2023                      Value: This result contains rich text formatting which cannot be displayed here. • Additional Information 01/04/2023                      Value: This result contains rich text formatting which cannot be displayed here. • Gross Description 01/04/2023                      Value: This result contains rich text formatting which cannot be displayed here. • Clinical Information 01/04/2023                      Value:IMPRESSION:  1. Large amount of solid food in proximal stomach, visualization was suboptimal  2. Mild gastritis  3. History of squamous cell dysplasia, status post RFA in the past, no recurrence of tumor  4. S/p TIF, no recurrence of hiatal hernia         Radiology Results:   No results found.

## 2023-07-14 NOTE — TELEPHONE ENCOUNTER
Procedure: EGD  Scheduled date of procedure (as of today):07/31/23  Physician performing procedure:Dr. Audrey Metcalf  Location of procedure:  Instructions reviewed with patient by:ti   Clearances: n/a

## 2023-07-29 RX ORDER — SODIUM CHLORIDE, SODIUM LACTATE, POTASSIUM CHLORIDE, CALCIUM CHLORIDE 600; 310; 30; 20 MG/100ML; MG/100ML; MG/100ML; MG/100ML
100 INJECTION, SOLUTION INTRAVENOUS CONTINUOUS
Status: CANCELLED | OUTPATIENT
Start: 2023-07-29

## 2023-07-31 ENCOUNTER — HOSPITAL ENCOUNTER (OUTPATIENT)
Dept: GASTROENTEROLOGY | Facility: HOSPITAL | Age: 78
Setting detail: OUTPATIENT SURGERY
Discharge: HOME | End: 2023-07-31
Attending: INTERNAL MEDICINE
Payer: MEDICARE

## 2023-07-31 ENCOUNTER — ANESTHESIA EVENT (OUTPATIENT)
Dept: GASTROENTEROLOGY | Facility: HOSPITAL | Age: 78
End: 2023-07-31

## 2023-07-31 ENCOUNTER — ANESTHESIA (OUTPATIENT)
Dept: GASTROENTEROLOGY | Facility: HOSPITAL | Age: 78
End: 2023-07-31

## 2023-07-31 VITALS
SYSTOLIC BLOOD PRESSURE: 156 MMHG | WEIGHT: 142 LBS | RESPIRATION RATE: 14 BRPM | HEART RATE: 66 BPM | DIASTOLIC BLOOD PRESSURE: 79 MMHG | BODY MASS INDEX: 27.88 KG/M2 | HEIGHT: 60 IN | TEMPERATURE: 98.6 F | OXYGEN SATURATION: 97 %

## 2023-07-31 DIAGNOSIS — K31.84 GASTROPARESIS: ICD-10-CM

## 2023-07-31 DIAGNOSIS — K21.9 GASTROESOPHAGEAL REFLUX DISEASE WITHOUT ESOPHAGITIS: ICD-10-CM

## 2023-07-31 PROCEDURE — 88305 TISSUE EXAM BY PATHOLOGIST: CPT | Performed by: PATHOLOGY

## 2023-07-31 PROCEDURE — 43239 EGD BIOPSY SINGLE/MULTIPLE: CPT | Performed by: INTERNAL MEDICINE

## 2023-07-31 RX ORDER — SODIUM CHLORIDE, SODIUM LACTATE, POTASSIUM CHLORIDE, CALCIUM CHLORIDE 600; 310; 30; 20 MG/100ML; MG/100ML; MG/100ML; MG/100ML
100 INJECTION, SOLUTION INTRAVENOUS CONTINUOUS
Status: DISCONTINUED | OUTPATIENT
Start: 2023-07-31 | End: 2023-08-04 | Stop reason: HOSPADM

## 2023-07-31 RX ORDER — LIDOCAINE HYDROCHLORIDE 20 MG/ML
INJECTION, SOLUTION EPIDURAL; INFILTRATION; INTRACAUDAL; PERINEURAL AS NEEDED
Status: DISCONTINUED | OUTPATIENT
Start: 2023-07-31 | End: 2023-07-31

## 2023-07-31 RX ORDER — PROPOFOL 10 MG/ML
INJECTION, EMULSION INTRAVENOUS AS NEEDED
Status: DISCONTINUED | OUTPATIENT
Start: 2023-07-31 | End: 2023-07-31

## 2023-07-31 RX ORDER — SODIUM CHLORIDE, SODIUM LACTATE, POTASSIUM CHLORIDE, CALCIUM CHLORIDE 600; 310; 30; 20 MG/100ML; MG/100ML; MG/100ML; MG/100ML
INJECTION, SOLUTION INTRAVENOUS CONTINUOUS PRN
Status: DISCONTINUED | OUTPATIENT
Start: 2023-07-31 | End: 2023-07-31

## 2023-07-31 RX ADMIN — PROPOFOL 130 MG: 10 INJECTION, EMULSION INTRAVENOUS at 10:19

## 2023-07-31 RX ADMIN — SODIUM CHLORIDE, SODIUM LACTATE, POTASSIUM CHLORIDE, AND CALCIUM CHLORIDE: .6; .31; .03; .02 INJECTION, SOLUTION INTRAVENOUS at 09:59

## 2023-07-31 RX ADMIN — PROPOFOL 50 MG: 10 INJECTION, EMULSION INTRAVENOUS at 10:23

## 2023-07-31 RX ADMIN — LIDOCAINE HYDROCHLORIDE 60 MG: 20 INJECTION, SOLUTION EPIDURAL; INFILTRATION; INTRACAUDAL; PERINEURAL at 10:19

## 2023-07-31 NOTE — ANESTHESIA POSTPROCEDURE EVALUATION
Post-Op Assessment Note    CV Status:  Stable  Pain Score: 0    Pain management: adequate     Mental Status:  Alert and awake   Hydration Status:  Stable   PONV Controlled:  None   Airway Patency:  Patent      Post Op Vitals Reviewed: Yes      Staff: CRNA         No notable events documented.     BP  140/64   Temp  98.6   Pulse  61   Resp  18    SpO2  98

## 2023-07-31 NOTE — ANESTHESIA PREPROCEDURE EVALUATION
Procedure:  EGD    Relevant Problems   ANESTHESIA (within normal limits)      CARDIO   (+) Arteriosclerotic coronary artery disease   (+) Atypical chest pain   (+) Benign essential hypertension   (+) HLD (hyperlipidemia)      ENDO   (+) Hypothyroidism      GI/HEPATIC   (+) Gastroesophageal reflux disease   (+) Hiatal hernia status post EsophyX TIF      MUSCULOSKELETAL   (+) Chronic right-sided low back pain without sciatica   (+) Hiatal hernia status post EsophyX TIF   (+) Primary osteoarthritis of one hip, right   (+) Sacroiliitis (HCC)      NEURO/PSYCH   (+) Alzheimer disease (HCC)   (+) Anxiety disorder   (+) Chronic right-sided low back pain without sciatica      PULMONARY   (+) Asthma   (+) Chronic obstructive pulmonary disease (HCC)        Physical Exam    Airway    Mallampati score: II  TM Distance: >3 FB  Neck ROM: full     Dental       Cardiovascular  Rhythm: regular, Rate: normal,     Pulmonary  Breath sounds clear to auscultation,     Other Findings        Anesthesia Plan  ASA Score- 3     Anesthesia Type- IV sedation with anesthesia with ASA Monitors. Additional Monitors:   Airway Plan:           Plan Factors-Exercise tolerance (METS): >4 METS. Chart reviewed. EKG reviewed. Existing labs reviewed. Patient summary reviewed. Patient is not a current smoker. Induction-     Postoperative Plan-     Informed Consent- Anesthetic plan and risks discussed with patient and daughter. I personally reviewed this patient with the CRNA. Discussed and agreed on the Anesthesia Plan with the CRNA. Germain Ashby

## 2023-07-31 NOTE — H&P
History and Physical -  [facility]  Greta Zuluaga 65 y.o. female MRN: [id number]                  HPI: Greta Zuluaga is a 65y.o. year old female who presents for GERD, abdominal pain, history of squamous cell dysplasia      REVIEW OF SYSTEMS: Per the HPI, and otherwise unremarkable. Historical Information   Past Medical History:   Diagnosis Date   • Allergic rhinitis     Last Assessed:10/22/2014   • Alzheimer disease ([address])    • Angina pectoris ([address])    • Ankle fracture, right     casted   • Anxiety    • Arthritis    • Asthma    • Cisneros esophagus    • Bursitis    • Cataracts, bilateral     scheduled for surgery   • Cervical herniated disc 04/04/2022   • Colon polyp    • Constipation    • Constipation    • COPD (chronic obstructive pulmonary disease) ([address])     does get SOB walking up stairs per patient-states she has phlegm today, cough 1/4/23   • Coronary artery disease    • Cystitis     chronic   • Dementia ([address]) 04/04/2022    short term memory loss   • Disease of thyroid gland     hypothyroid   • Diverticulosis    • Fasciculations    • Fibromyalgia    • Fibromyalgia, primary    • GERD (gastroesophageal reflux disease)     patient states she gets alot of heartburn-takes Protonix and Pepcid. Also has solid food regurgitation at times as well as liquids. 1/4/23   • H/o Lyme disease    • Heart murmur    • Hiatal hernia    • History of Clostridium difficile infection    • Hyper reflexia    • Hyperlipidemia    • Hypertension     on 4/4/22 pt denies having HTN   • Hypoglycemia     patient states she feels well today regarding blood sugar. • Hypothyroid     hypo   • IBS (irritable bowel syndrome)    • Labral tear of shoulder     left   • Lichen sclerosus of female genitalia 2016   • Murmur    • Neck pain, chronic     gets steroid injections-none since late 2016   • Neuralgia    • Oral lichen planus    • Peritonitis ([address])    • Spinal stenosis     lumbar; has cervical pain as well at times. 1/4/23   • Spinal stenosis    • Tachycardia 04/04/2022    at times   • Ulcer    • Ulcer of gastric fundus    • Vertigo    • Wears glasses    • Wears partial dentures     upper and lower     Past Surgical History:   Procedure Laterality Date   • ABDOMINAL SURGERY      exploratory-removal of appendix   • APPENDECTOMY     • CARDIAC CATHETERIZATION     • CARPAL TUNNEL RELEASE Bilateral    • CHOLECYSTECTOMY     • COLONOSCOPY      sigmoid diverticulosis,5mm rectal tubular adenoma5/06   • DILATION AND CURETTAGE OF UTERUS      x3 miscarriages   • EGD     • FOOT SURGERY Right     5th toe-hammertoe repair   • FOOT SURGERY     • HIATAL HERNIA REPAIR     • HYSTERECTOMY  01/09/2012    complete ; for bleeding,tubal ligation,vaginal prolapse and rectocele repair   • OVARIAN CYST REMOVAL     • [place] COLONOSCOPY FLX DX W/COLLJ SPEC WHEN PFRMD N/A 10/23/2017    Procedure: EGD AND COLONOSCOPY;  Surgeon: Debbie Perez MD;  Location: [facility] GI LAB;   Service: Gastroenterology   • [place] SURGICAL ARTHROSCOPY WILLIAN W/CORACOACRM LIGM RLS Left 11/13/2017    Procedure: SHOULDER ARTHROSCOPY WITH SUBACROMIAL DECOMPRESSION, ROTATOR CUFF REPAIR;  Surgeon: Deanna Regalado MD;  Location: [facility] MAIN OR;  Service: Orthopedics   • ROTATOR CUFF REPAIR Right    • TONSILLECTOMY       Social History   Social History     Substance and Sexual Activity   Alcohol Use No    Comment: stopped drinking in 2012     Social History     Substance and Sexual Activity   Drug Use Never     Social History     Tobacco Use   Smoking Status Never   Smokeless Tobacco Never     Family History   Problem Relation Age of Onset   • Cancer Mother         colon   • Ulcerative colitis Mother    • Alzheimer's disease Father    • Heart disease Sister         MI x4-angioplasty x4 stents   • Colonic polyp Sister    • Ulcerative colitis Sister    • Asthma Sister    • COPD Sister    • Cancer Brother         brain tumor-age 6   • Cancer Brother 72        prostate   • Arthritis Family    • Osteoarthritis Family    • Cancer Other         colon cancer   • Crohn's disease Neg Hx    • Liver cancer Neg Hx        Meds/Allergies     (Not in a hospital admission)      Allergies   Allergen Reactions   • Omnipaque [Iohexol] Other (See Comments)     Shakes, "passed out"   • Pneumovax 23 [Pneumococcal Vac Polyvalent] Hives   • Iodine - Food Allergy    • Iv Dye  [Iodinated Contrast Media] Confusion     Annotation - [id number]: shaking   • Aspirin GI Intolerance and Nausea Only     Reaction Date: [id number]; Annotation - [id number]: 325mg causes bleeding  Cannot take a dose higher than 81mg-pt has a hx of ulcer   • Codeine GI Intolerance     N/V   • Flexeril [Cyclobenzaprine]      Unknown reaction per pt. Allergy was noted on physicians note   • Latex Rash   • Other Rash     Adhesive Tape-caused a rash       Objective     BP (!) 191/95   Pulse 70   Temp (!) 97.2 °F (36.2 °C) (Temporal)   Resp 20   Ht 5' (1.524 m)   Wt 64.4 kg (142 lb)   SpO2 96%   BMI 27.73 kg/m²       PHYSICAL EXAM    Gen: NAD  CV: RRR  CHEST: Clear  ABD: soft, NT/ND  EXT: no edema      ASSESSMENT/PLAN:  This is a 65y.o. year old female here for EGD, and she is stable and optimized for her procedure.

## 2023-08-01 PROCEDURE — 88305 TISSUE EXAM BY PATHOLOGIST: CPT | Performed by: PATHOLOGY

## 2023-08-29 DIAGNOSIS — K21.00 GASTROESOPHAGEAL REFLUX DISEASE WITH ESOPHAGITIS, UNSPECIFIED WHETHER HEMORRHAGE: ICD-10-CM

## 2023-08-29 RX ORDER — FAMOTIDINE 40 MG/1
40 TABLET, FILM COATED ORAL
Qty: 30 TABLET | Refills: 1 | Status: SHIPPED | OUTPATIENT
Start: 2023-08-29 | End: 2023-09-28

## 2023-09-06 DIAGNOSIS — J30.0 VASOMOTOR RHINITIS: ICD-10-CM

## 2023-09-06 RX ORDER — IPRATROPIUM BROMIDE 21 UG/1
SPRAY, METERED NASAL
Qty: 30 ML | Refills: 6 | Status: SHIPPED | OUTPATIENT
Start: 2023-09-06

## 2023-09-14 DIAGNOSIS — K21.00 GASTROESOPHAGEAL REFLUX DISEASE WITH ESOPHAGITIS, UNSPECIFIED WHETHER HEMORRHAGE: ICD-10-CM

## 2023-09-14 RX ORDER — FAMOTIDINE 40 MG/1
40 TABLET, FILM COATED ORAL
Qty: 30 TABLET | Refills: 5 | Status: SHIPPED | OUTPATIENT
Start: 2023-09-14 | End: 2023-10-14

## 2023-11-30 ENCOUNTER — OFFICE VISIT (OUTPATIENT)
Dept: CARDIOLOGY CLINIC | Facility: CLINIC | Age: 78
End: 2023-11-30
Payer: MEDICARE

## 2023-11-30 VITALS
HEIGHT: 60 IN | OXYGEN SATURATION: 95 % | BODY MASS INDEX: 30.04 KG/M2 | DIASTOLIC BLOOD PRESSURE: 74 MMHG | SYSTOLIC BLOOD PRESSURE: 140 MMHG | WEIGHT: 153 LBS | HEART RATE: 71 BPM

## 2023-11-30 DIAGNOSIS — E78.2 MIXED HYPERLIPIDEMIA: ICD-10-CM

## 2023-11-30 DIAGNOSIS — I10 BENIGN ESSENTIAL HYPERTENSION: ICD-10-CM

## 2023-11-30 DIAGNOSIS — I20.9 ANGINA PECTORIS (HCC): ICD-10-CM

## 2023-11-30 DIAGNOSIS — R00.0 TACHYCARDIA: ICD-10-CM

## 2023-11-30 DIAGNOSIS — R07.89 OTHER CHEST PAIN: ICD-10-CM

## 2023-11-30 DIAGNOSIS — I25.10 ARTERIOSCLEROTIC CORONARY ARTERY DISEASE: ICD-10-CM

## 2023-11-30 DIAGNOSIS — F41.1 GENERALIZED ANXIETY DISORDER: ICD-10-CM

## 2023-11-30 PROCEDURE — 99214 OFFICE O/P EST MOD 30 MIN: CPT | Performed by: INTERNAL MEDICINE

## 2023-11-30 PROCEDURE — 93000 ELECTROCARDIOGRAM COMPLETE: CPT | Performed by: INTERNAL MEDICINE

## 2023-11-30 RX ORDER — OXYCODONE HYDROCHLORIDE AND ACETAMINOPHEN 5; 325 MG/1; MG/1
TABLET ORAL
COMMUNITY
Start: 2023-09-20

## 2023-11-30 NOTE — PROGRESS NOTES
Progress Note - Cardiology Office  [facility]    Alex Degroot 77 y.o. female MRN: [id number]  : 1945  Encounter: [id number]      Assessment:     1. Other chest pain    2. Benign essential hypertension    3. Arteriosclerotic coronary artery disease    4. Tachycardia    5. Mixed hyperlipidemia    6. Generalized anxiety disorder    7. Angina pectoris ([address])        Discussion Summary and Plan:    1. Benign essential hypertension. Her blood pressure is acceptable continue metoprolol XL she is on Toprol XL 25 mg daily. Blood pressure acceptable continue same medications. 2. Coronary artery disease. Status post cardiac catheterization twice in  and  with mild nonobstructive coronary artery disease. Continue medical Rx with aspirin statin. Patient is having episodes of chest pain she will be scheduled for Lexiscan stress test she has not had any test done since . 3. Dyslipidemia. She is on Lipitor 10 mg she get blood test with her primary care doctor. She is tolerating it very well. She get blood test with her primary care doctor. 4. Hypothyroidism. On Synthroid. He is on levothyroxine monitor TSH    5. History of DJD/back pain and fibromyalgia. Cannot walk on the treadmill. 6. Anxiety. Patient was reassured that her symptoms of less likely to be cardiac in origin. Discussed with patient and family members. 7. Large hiatal hernia status post procedure by endoscopy for treatment. Follow-up with GI.    8.  Episodes of chest pain. No recent stressors she will be scheduled for Lexiscan stress test.    Continue current Rx. Follow-up 6 months    Please call [phone], if any questions. Counseling :  A description of the counseling. Goals and Barriers. Patient's ability to self care: Yes  Medication side effect reviewed with patient in detail and all their questions answered to their satisfaction.     HPI :     Alex Degroot is a 77y.o. year old female who came for follow up. Patient has with a past medical history significant for mild nonobstructive coronary artery disease cardiac catheterization, dyslipidemia, back pain, asthma who came to see us for routine follow-up. Patient complains of some atypical chest pain which had one episode a few weeks ago with no relation to exercise. Patient is pretty active and walks every day with no symptoms of any chest pain or any shortness of breath. She has no fever no chills no nausea no vomiting no other significant complaint     03/19/2021. Above reviewed. Patient came for follow-up she is doing well. She has not wheezing at this time. Asthma is under control. She has a catheterization done in 2012 and 14 found to have mild nonobstructive disease. Since then she has no symptoms of chest pain today heart rate 63 beats per minute EKG shows no significant change. She has decently active. Her weight has remained stable around 140 lb. No nausea no fever no chills no PND no orthopnea no other issues. She does get easily anxious and then her heart rate goes faster she is taking antianxiety medication. 5/5/2023. Labs reviewed. Patient came for follow-up she is doing well from cardiac point of view. She had a history of nonobstructive coronary arteries by cath in 2012 and 2014, bronchial asthma but did not need to use inhalers, dyslipidemia, hypothyroidism, anxiety and some exertional shortness of breath. She was trying hard she lost about 8 pounds she feels well Echo Doppler in November 2020 shows normal LV systolic function. She is still struggling with her memory loss. Her EKG shows normal sinus some heart rate 68 bpm no other cardiovascular issues. She is trying hard and she has lost some weight. Her BMI now around 28    11/30/2023. Reviewed. Patient came for follow-up. She is complaining about some left-sided chest pain.   She had history of cardiac catheterization in 2012 where she was found to have nonobstructive coronary artery disease, history of dyslipidemia hypothyroidism anxiety who has some exertional shortness of breath and episode of chest pain. Chest pain has no relationship to any activities. She is not able to walk on the treadmill. She still struggling with her memory loss EKG shows normal sinus some LVH by voltage no change from previous EKG. Review of Systems   Constitutional:  Negative for activity change, chills, diaphoresis, fever and unexpected weight change. HENT:  Negative for congestion. Eyes:  Negative for discharge and redness. Respiratory:  Negative for cough, chest tightness, shortness of breath and wheezing. Cardiovascular:  Positive for chest pain. Negative for palpitations and leg swelling. Gastrointestinal:  Negative for abdominal pain, diarrhea and nausea. Endocrine: Negative. Genitourinary:  Negative for decreased urine volume and urgency. Musculoskeletal: Negative. Negative for arthralgias, back pain and gait problem. Skin:  Negative for rash and wound. Allergic/Immunologic: Negative. Neurological:  Negative for dizziness, seizures, syncope, weakness, light-headedness and headaches. Hematological: Negative. Psychiatric/Behavioral:  Negative for agitation and confusion. The patient is nervous/anxious.          Memory loss        Historical Information   Past Medical History:   Diagnosis Date   • Allergic rhinitis     Last Assessed:10/22/2014   • Alzheimer disease ([address])    • Angina pectoris ([address])    • Ankle fracture, right     casted   • Anxiety    • Arthritis    • Asthma    • Cisneros esophagus    • Bursitis    • Cataracts, bilateral     scheduled for surgery   • Cervical herniated disc 04/04/2022   • Colon polyp    • Constipation    • Constipation    • COPD (chronic obstructive pulmonary disease) ([address])     does get SOB walking up stairs per patient-states she has phlegm today, cough 1/4/23   • Coronary artery disease    • Cystitis     chronic   • Dementia ([address]) 04/04/2022    short term memory loss   • Disease of thyroid gland     hypothyroid   • Diverticulosis    • Fasciculations    • Fibromyalgia    • Fibromyalgia, primary    • GERD (gastroesophageal reflux disease)     patient states she gets alot of heartburn-takes Protonix and Pepcid. Also has solid food regurgitation at times as well as liquids. 1/4/23   • H/o Lyme disease    • Heart murmur    • Hiatal hernia    • History of Clostridium difficile infection    • Hyper reflexia    • Hyperlipidemia    • Hypertension     on 4/4/22 pt denies having HTN   • Hypoglycemia     patient states she feels well today regarding blood sugar. • Hypothyroid     hypo   • IBS (irritable bowel syndrome)    • Labral tear of shoulder     left   • Lichen sclerosus of female genitalia 2016   • Murmur    • Neck pain, chronic     gets steroid injections-none since late 2016   • Neuralgia    • Oral lichen planus    • Peritonitis ([address])    • Spinal stenosis     lumbar; has cervical pain as well at times.   1/4/23   • Spinal stenosis    • Tachycardia 04/04/2022    at times   • Ulcer    • Ulcer of gastric fundus    • Vertigo    • Wears glasses    • Wears partial dentures     upper and lower     Past Surgical History:   Procedure Laterality Date   • ABDOMINAL SURGERY      exploratory-removal of appendix   • APPENDECTOMY     • CARDIAC CATHETERIZATION     • CARPAL TUNNEL RELEASE Bilateral    • CHOLECYSTECTOMY     • COLONOSCOPY      sigmoid diverticulosis,5mm rectal tubular adenoma5/06   • DILATION AND CURETTAGE OF UTERUS      x3 miscarriages   • EGD     • FOOT SURGERY Right     5th toe-hammertoe repair   • FOOT SURGERY     • HIATAL HERNIA REPAIR     • HYSTERECTOMY  01/09/2012    complete ; for bleeding,tubal ligation,vaginal prolapse and rectocele repair   • OVARIAN CYST REMOVAL     • AZ COLONOSCOPY FLX DX W/COLLJ SPEC WHEN PFRMD N/A 10/23/2017    Procedure: EGD AND COLONOSCOPY;  Surgeon: Elena Rubio MD; Location: [facility]; Service: Gastroenterology   • NH SURGICAL ARTHROSCOPY WILLIAN W/CORACOACRM LIGM RLS Left 11/13/2017    Procedure: SHOULDER ARTHROSCOPY WITH SUBACROMIAL DECOMPRESSION, ROTATOR CUFF REPAIR;  Surgeon: Thanh Llamas MD;  Location: [facility] MAIN OR;  Service: Orthopedics   • ROTATOR CUFF REPAIR Right    • TONSILLECTOMY       Social History     Substance and Sexual Activity   Alcohol Use No    Comment: stopped drinking in 2012     Social History     Substance and Sexual Activity   Drug Use Never     Social History     Tobacco Use   Smoking Status Never   Smokeless Tobacco Never     Family History:   Family History   Problem Relation Age of Onset   • Cancer Mother         colon   • Ulcerative colitis Mother    • Alzheimer's disease Father    • Heart disease Sister         MI x4-angioplasty x4 stents   • Colonic polyp Sister    • Ulcerative colitis Sister    • Asthma Sister    • COPD Sister    • Cancer Brother         brain tumor-age 6   • Cancer Brother 72        prostate   • Arthritis Family    • Osteoarthritis Family    • Cancer Other         colon cancer   • Crohn's disease Neg Hx    • Liver cancer Neg Hx        Meds/Allergies     Allergies   Allergen Reactions   • Omnipaque [Iohexol] Other (See Comments)     Shakes, "passed out"   • Pneumovax 23 [Pneumococcal Vac Polyvalent] Hives   • Iodine - Food Allergy    • Iv Dye  [Iodinated Contrast Media] Confusion     Annotation - [id number]: shaking   • Aspirin GI Intolerance and Nausea Only     Reaction Date: [id number]; Annotation - [id number]: 325mg causes bleeding  Cannot take a dose higher than 81mg-pt has a hx of ulcer   • Codeine GI Intolerance     N/V   • Flexeril [Cyclobenzaprine]      Unknown reaction per pt.  Allergy was noted on physicians note   • Latex Rash   • Other Rash     Adhesive Tape-caused a rash       Current Outpatient Medications:   •  oxyCODONE-acetaminophen (PERCOCET) 5-325 mg per tablet, TAKE 1 TABLET BY MOUTH EVERY 6 (SIX) HOURS AS NEEDED FOR SEVERE PAIN (PAIN SCORE 7-10). MAX DAILY AMOUNT  4 TABLETS, Disp: , Rfl:   •  atorvastatin (LIPITOR) 10 mg tablet, Take 10 mg by mouth daily , Disp: , Rfl:   •  celecoxib (CeleBREX) 100 mg capsule, Take 100 mg by mouth daily, Disp: , Rfl:   •  donepezil (ARICEPT) 10 mg tablet, Take 10 mg by mouth daily at bedtime , Disp: , Rfl: 5  •  famotidine (PEPCID) 40 MG tablet, TAKE 1 TABLET (40 MG TOTAL) BY MOUTH DAILY AT BEDTIME, Disp: 30 tablet, Rfl: 5  •  ferrous sulfate 325 (65 FE) MG EC tablet, Take 1 tablet by mouth daily, Disp: , Rfl:   •  ipratropium (ATROVENT) 0.03 % nasal spray, USE 2 SPRAYS INTO EACH NOSTRIL EVERY 12 (TWELVE) HOURS, Disp: 30 mL, Rfl: 6  •  levothyroxine 100 mcg tablet, Take 100 mcg by mouth daily, Disp: , Rfl:   •  levothyroxine 88 mcg tablet, TAKE 1 TABLET ONCE EVERY DAY, Disp: , Rfl:   •  metoprolol succinate (TOPROL-XL) 25 mg 24 hr tablet, TAKE 1 TABLET (25 MG TOTAL) BY MOUTH DAILY, Disp: 90 tablet, Rfl: 3  •  naproxen (NAPROSYN) 500 mg tablet, Take 500 mg by mouth 2 (two) times a day, Disp: , Rfl:   •  ondansetron (ZOFRAN) 4 mg tablet, Take 1 tablet (4 mg total) by mouth every 6 (six) hours for 12 days, Disp: 12 tablet, Rfl: 0  •  pantoprazole (PROTONIX) 40 mg tablet, TAKE ONE (1) TABLET BY MOUTH DAILY, Disp: 30 tablet, Rfl: 2  •  PARoxetine (PAXIL) 10 mg tablet, Take 10 mg by mouth daily , Disp: , Rfl:   •  QUEtiapine (SEROquel) 25 mg tablet, Take 25 mg by mouth daily at bedtime, Disp: , Rfl:     Vitals: Blood pressure 140/74, pulse 71, height 5' (1.524 m), weight 69.4 kg (153 lb), SpO2 95 %, not currently breastfeeding. ?  Body mass index is 29.88 kg/m². Vitals:    11/30/23 1020   Weight: 69.4 kg (153 lb)     BP Readings from Last 3 Encounters:   11/30/23 140/74   07/31/23 156/79   07/14/23 148/78       Physical Exam  Constitutional:       General: She is not in acute distress. Appearance: She is well-developed. She is not diaphoretic. Neck:      Thyroid: No thyromegaly. Vascular: No JVD. Trachea: No tracheal deviation. Cardiovascular:      Rate and Rhythm: Normal rate and regular rhythm. Heart sounds: S1 normal and S2 normal. Heart sounds not distant. Murmur heard. Systolic (ejection) murmur is present with a grade of 2/6. No friction rub. No gallop. No S3 or S4 sounds. Pulmonary:      Effort: Pulmonary effort is normal. No respiratory distress. Breath sounds: Normal breath sounds. No wheezing or rales. Chest:      Chest wall: No tenderness. Abdominal:      General: Bowel sounds are normal. There is no distension. Palpations: Abdomen is soft. Tenderness: There is no abdominal tenderness. Musculoskeletal:         General: No deformity. Cervical back: Neck supple. Skin:     General: Skin is warm and dry. Coloration: Skin is not pale. Findings: No rash. Neurological:      Mental Status: She is alert and oriented to person, place, and time. Psychiatric:         Behavior: Behavior normal.         Judgment: Judgment normal.         Diagnostic Studies Review Cardio:    Stress Test: Nuclear stress test done in 2012 at by Dr. Wing Aranda shows mild to moderate inferior lateral ischemia EF was normal. Catheter done in 2012 and 14 shows no evidence of obstructive coronary artery disease. Echocardiogram/FACUNDO: Echo Doppler done in 2011 shows EF 60%, trace MR, trace TR. Echo Doppler done November 2020 shows normal LV systolic function mild LVH mild MR, trace TR PA pressure 30 mmHg. Catheterization: Cardiac catheterization done in August 2014 shows mild nonobstructive 25-30% stenosis with normal LV systolic function. was no different than cardiac catheter patient done in 2012. Vascular imaging: Vascular study done in April 2015 shows no significant ICA disease. ECG Report: Twelve-lead EKG done 4/7/2017 shows normal sinus heart rate 76 bpm. No cigarette ST changes.     Twelve lead EKG done 01/03/2019 shows normal sinus rhythm heart rate 62 beats per minute. No significant ST changes. No change from old EKG    Twelve lead EKG done 07/03/2019 65 beats per minute normal sinus rhythm no significant ST changes no change from old it is normal EKG. Twelve lead EKG 07/03/2019 shows sinus tachycardia heart rate 102 beats per minute as compared to previous EKGs patient's heart rate is faster. Twelve lead EKG 09/30/2020 shows normal sinus rhythm heart rate 68 beats per minute no significant change from old EKG. Twelve lead EKG done 03/19/2021 shows normal sinus rhythm. Heart rate 63 beats per minute. a 12 lead EKG done on 11/18/2021 shows normal sinus rhythm, heart rate 66 beats per minute. Minimal voltage for LVH no change from previous EKG     Twelve lead EKG 05/02/2022 shows normal sinus rhythm heart rate 76 beats per minute no change from previous EKG. Twelve lead EKG done on 11/18/2022 shows normal sinus rhythm heart rate 69 beats per minute. Twelve-lead EKG 5/5/2023 shows normal sinus some heart rate 68 bpm.    Twelve-lead EKG done on 11/30/2023 shows normal sinus rhythm left axis deviation LVH by voltage no other change from previous EKG.     Lab Review   Lab Results   Component Value Date    WBC 7.92 08/02/2020    HGB 12.5 08/02/2020    HCT 41.7 08/02/2020    MCV 77 (L) 08/02/2020    RDW 18.1 (H) 08/02/2020     08/02/2020     BMP:  Lab Results   Component Value Date    SODIUM 137 08/02/2020    K 4.1 08/02/2020     08/02/2020    CO2 24 08/02/2020    ANIONGAP 13.4 12/28/2015    BUN 9 08/02/2020    CREATININE 0.74 08/02/2020    GLUCOSE 98 12/28/2015    GLUF 108 (H) 03/13/2018    CALCIUM 9.0 08/02/2020    EGFR 80 08/02/2020     LFT:  Lab Results   Component Value Date    AST 35 08/02/2020    ALT 30 08/02/2020    ALKPHOS 115 08/02/2020    TP 8.0 08/02/2020    ALB 4.0 08/02/2020      Lab Results   Component Value Date    ERD6GJRMNZGQ 4.508 (H) 08/02/2020     Lipid Profile:   Lab Results Component Value Date    CHOLESTEROL 170 03/13/2018    HDL 45 03/13/2018    LDLCALC 88 03/13/2018    TRIG 183 (H) 03/13/2018     Lab Results   Component Value Date    CHOLESTEROL 170 03/13/2018    CHOLESTEROL 150 07/07/2016     Lab Results   Component Value Date    CKTOTAL 77 10/06/2017     Last TSH 03/13/2008 was 1.6. Dr. Ce Wood MD [address] [place] [address]      "This note has been constructed using a voice recognition system. Therefore there may be syntax, spelling, and/or grammatical errors.  Please call if you have any questions. "

## 2023-12-12 ENCOUNTER — TELEPHONE (OUTPATIENT)
Dept: CARDIOLOGY CLINIC | Facility: CLINIC | Age: 78
End: 2023-12-12

## 2023-12-12 ENCOUNTER — HOSPITAL ENCOUNTER (OUTPATIENT)
Dept: RADIOLOGY | Facility: HOSPITAL | Age: 78
Discharge: HOME | End: 2023-12-12
Attending: INTERNAL MEDICINE
Payer: MEDICARE

## 2023-12-12 ENCOUNTER — HOSPITAL ENCOUNTER (OUTPATIENT)
Dept: NON INVASIVE DIAGNOSTICS | Facility: HOSPITAL | Age: 78
Discharge: HOME | End: 2023-12-12
Attending: INTERNAL MEDICINE
Payer: MEDICARE

## 2023-12-12 VITALS
RESPIRATION RATE: 20 BRPM | DIASTOLIC BLOOD PRESSURE: 90 MMHG | OXYGEN SATURATION: 95 % | HEART RATE: 75 BPM | SYSTOLIC BLOOD PRESSURE: 138 MMHG

## 2023-12-12 DIAGNOSIS — R00.0 TACHYCARDIA: ICD-10-CM

## 2023-12-12 DIAGNOSIS — I25.10 ARTERIOSCLEROTIC CORONARY ARTERY DISEASE: ICD-10-CM

## 2023-12-12 DIAGNOSIS — F41.1 GENERALIZED ANXIETY DISORDER: ICD-10-CM

## 2023-12-12 DIAGNOSIS — E78.2 MIXED HYPERLIPIDEMIA: ICD-10-CM

## 2023-12-12 DIAGNOSIS — I10 BENIGN ESSENTIAL HYPERTENSION: ICD-10-CM

## 2023-12-12 DIAGNOSIS — R07.89 OTHER CHEST PAIN: ICD-10-CM

## 2023-12-12 LAB
MAX HR PERCENT: 79 %
MAX HR: 112 BPM
RATE PRESSURE PRODUCT: NORMAL
SL CV REST NUCLEAR ISOTOPE DOSE: 10.77 MCI
SL CV STRESS NUCLEAR ISOTOPE DOSE: 31.89 MCI
SL CV STRESS RECOVERY BP: NORMAL MMHG
SL CV STRESS RECOVERY HR: 283 BPM
SL CV STRESS RECOVERY O2 SAT: 96 %
STRESS ANGINA INDEX: 0
STRESS BASELINE BP: NORMAL MMHG
STRESS BASELINE HR: 75 BPM
STRESS O2 SAT REST: 95 %
STRESS PEAK HR: 101 BPM
STRESS POST ESTIMATED WORKLOAD: 1.6 METS
STRESS POST EXERCISE DUR MIN: 3 MIN
STRESS POST O2 SAT PEAK: 95 %
STRESS POST PEAK BP: 146 MMHG

## 2023-12-12 PROCEDURE — 78452 HT MUSCLE IMAGE SPECT MULT: CPT

## 2023-12-12 PROCEDURE — 93018 CV STRESS TEST I&R ONLY: CPT | Performed by: INTERNAL MEDICINE

## 2023-12-12 PROCEDURE — 78452 HT MUSCLE IMAGE SPECT MULT: CPT | Performed by: INTERNAL MEDICINE

## 2023-12-12 PROCEDURE — 93016 CV STRESS TEST SUPVJ ONLY: CPT | Performed by: INTERNAL MEDICINE

## 2023-12-12 PROCEDURE — A9502 TC99M TETROFOSMIN: HCPCS

## 2023-12-12 PROCEDURE — 93017 CV STRESS TEST TRACING ONLY: CPT

## 2023-12-12 RX ORDER — REGADENOSON 0.08 MG/ML
0.4 INJECTION, SOLUTION INTRAVENOUS ONCE
Status: COMPLETED | OUTPATIENT
Start: 2023-12-12 | End: 2023-12-12

## 2023-12-12 RX ADMIN — REGADENOSON 0.4 MG: 0.08 INJECTION, SOLUTION INTRAVENOUS at 10:20

## 2023-12-12 NOTE — TELEPHONE ENCOUNTER
----- Message from Gato Smiley MD sent at 12/12/2023  1:57 PM EST -----  Pt's Patient's stress test is normal.   Patient can keep regular appointment. Please call patient with the result.

## 2023-12-13 LAB
CHEST PAIN STATEMENT: NORMAL
MAX DIASTOLIC BP: 80 MMHG
MAX PREDICTED HEART RATE: 141 BPM
PROTOCOL NAME: NORMAL
REASON FOR TERMINATION: NORMAL
STRESS POST EXERCISE DUR MIN: 3 MIN
STRESS POST EXERCISE DUR SEC: 0 SEC
STRESS POST PEAK HR: 112 BPM
STRESS POST PEAK SYSTOLIC BP: 146 MMHG
TARGET HR FORMULA: NORMAL
TEST INDICATION: NORMAL

## 2024-01-11 ENCOUNTER — TELEPHONE (OUTPATIENT)
Dept: CARDIOLOGY CLINIC | Facility: CLINIC | Age: 79
End: 2024-01-11

## 2024-01-11 NOTE — TELEPHONE ENCOUNTER
Pre. Op. Clearance note- Cardiology    Xiomara Marquez   78 y.o.  female  1945      Dr. Raúl Fall   (f) [phone]    Xiomara Marquez :     Patient's chart was reviewed for preop clearance.   Patient was seen in our office on 2023.  Patient has past medical history significant for benign essential hypertension, arteriosclerotic coronary artery disease, mixed hyperlipidemia.  Patient is now scheduled for Right reverse TSA 2024.      Patient has no clinical evidence of  active heart failure or  active ischemia or active arrhythmia.  Patient's last cardiac workup including nuclear stress test reports were reviewed and it shows no ischemia.  Stress test was done in 2023.      In my opinion patient is in optimum condition for the procedure as planned.  Patient is low risk for the surgery as planned from cardiac point of view.  Continue current cardiac medications.      If you have any question please do not hesitate to call us at our office of [facility].  Phone # [phone]        Lab Results   Component Value Date    WBC 7.92 2020    HGB 12.5 2020    HCT 41.7 2020    MCV 77 (L) 2020     2020     Lab Results   Component Value Date    CREATININE 0.74 2020     Lab Results   Component Value Date    GLUF 108 (H) 2018       Cardiac testing:   Results for orders placed during the hospital encounter of 20    Echo complete with contrast if indicated    Narrative  [address] [address] [phone]    Transthoracic Echocardiogram  2D, M-mode, Doppler, and Color Doppler    Study date:  2020    Patient: XIOMARA MARQUEZ  MR number: [id number]  Account number: [id number]  : 1945  Age: 75 years  Gender: Female  Status: Outpatient  Location: Echo lab  Height: 60 in  Weight: 156 lb  BP: 120/ 60 mmHg    Indications: Hypertension    Diagnoses: I10. -  Essential (primary) hypertension    Sonographer:  TRACI Perdomo  Primary Physician:  Alyse Shrestha MD  Referring Physician:  Polo Renteria MD  Group:  [facility]  Interpreting Physician:  Polo Renteria MD    SUMMARY    LEFT VENTRICLE:  Systolic function was normal. Ejection fraction was estimated in the range of 55 % to 60 % to be 60 %.  There were no regional wall motion abnormalities.  Wall thickness was mildly increased.  There was mild borderline concentric hypertrophy.    MITRAL VALVE:  There was mild annular calcification.  There was mild regurgitation.    AORTIC VALVE:  There was trace regurgitation.    TRICUSPID VALVE:  There was mild regurgitation.  Estimated peak PA pressure was 30 mmHg.    HISTORY: PRIOR HISTORY: Asthma,CAD,GERD,HLD,HTN,Tachycardia,Chest pain,Murmur    PROCEDURE: The procedure was performed in the echo lab. This was a routine study. The transthoracic approach was used. The study included complete 2D imaging, M-mode, complete spectral Doppler, and color Doppler. The heart rate was 58 bpm,  at the start of the study. Echocardiographic views were limited due to lung interference. Image quality was adequate.    LEFT VENTRICLE: Size was normal. Systolic function was normal. Ejection fraction was estimated in the range of 55 % to 60 % to be 60 %. There were no regional wall motion abnormalities. Wall thickness was mildly increased. There was mild  borderline concentric hypertrophy. DOPPLER: Left ventricular diastolic function parameters were normal for the patient's age.    RIGHT VENTRICLE: The size was normal. Systolic function was normal.    LEFT ATRIUM: Size was normal.    RIGHT ATRIUM: Size was normal.    MITRAL VALVE: There was mild annular calcification. There was normal leaflet separation. DOPPLER: The transmitral velocity was within the normal range. There was no evidence for stenosis. There was mild regurgitation.    AORTIC VALVE: The valve was  trileaflet. Leaflets exhibited mildly increased thickness, mild calcification, and normal cuspal separation. DOPPLER: Transaortic velocity was within the normal range. There was no evidence for stenosis. There  was trace regurgitation.    TRICUSPID VALVE: DOPPLER: There was mild regurgitation. Estimated peak PA pressure was 30 mmHg.    PULMONIC VALVE: DOPPLER: There was no significant regurgitation.    PERICARDIUM: There was no thickening or calcification. There was no pericardial effusion.    AORTA: The root exhibited normal size.    SYSTEMIC VEINS: IVC: The inferior vena cava was normal in size. Respirophasic changes were normal.    SYSTEM MEASUREMENT TABLES    2D  Ao Diam: 3.08 cm  LA Area: 12.98 cm2  LA Diam: 3.06 cm  LVEDV MOD A4C: 56.85 ml  LVEF MOD A4C: 60.35 %  LVESV MOD A4C: 22.54 ml  LVLd A4C: 7.08 cm  LVLs A4C: 5.78 cm  RA Area: 15.46 cm2  RVIDd: 3.44 cm  SV MOD A4C: 34.31 ml    PW  MV E/A Ratio: 1.28    [employer] Accredited Echocardiography Laboratory    Prepared and electronically signed by    Polo Renteria MD  Signed 02-Nov-2020 10:13:24    No results found for this or any previous visit.    No results found for this or any previous visit.    No results found for this or any previous visit.    No results found for this or any previous visit.    No results found for this or any previous visit.    Results for orders placed during the hospital encounter of 12/12/23    NM myocardial perfusion spect (rx stress and/or rest)    Interpretation Summary    Stress Combined Conclusion: The ECG and SPECT imaging portions of the stress study are discordant but are nonetheless concerning for inducible myocardial ischemia given the known limited sensitivity of pharmacologic stress electrocardiography. Ideal imaging could not be obtained since patient's right arm had to be on the side, prone images could not be obtained and there was increased subdiaphragmatic tracer uptake.    The stress ECG is  "negative for ischemia after vasodilation and low level exercise, without reproduction of symptoms.    Perfusion: There is a left ventricular perfusion defect that is small to medium in size present in the apical to mid lateral location that is reversible.  Summed stress score is 9 and differential score is 8. Ideal imaging could not be obtained since patient's right arm had to be on the side, prone images could not be obtained and there was increased subdiaphragmatic tracer uptake.    Stress Function: Left ventricular function post-stress is normal.  Calculated ejection fraction is 89%.    Perfusion Defect Conclusion: There is no evidence of transient ischemic dilation (TID).      DR.Narpinder Dexter MD [facility]  1/11/2024  11:56 AM      \"This note was completed in part utilizing [employer] direct voice recognition software.   Grammatical errors, random word insertion, spelling mistakes, and incomplete sentences may be an occasional consequence of the system secondary to software limitations, ambient noise and hardware issues.    Please read the chart carefully and recognize, using context, where substitutions have occurred.  If you have any questions or concerns about the context, text or information contained within the body of this dictation, please contact myself, the provider, for further clarification.\"  "

## 2024-01-17 DIAGNOSIS — R00.0 TACHYCARDIA: ICD-10-CM

## 2024-01-17 RX ORDER — METOPROLOL SUCCINATE 25 MG/1
25 TABLET, EXTENDED RELEASE ORAL DAILY
Qty: 90 TABLET | Refills: 1 | Status: SHIPPED | OUTPATIENT
Start: 2024-01-17

## 2024-02-21 PROBLEM — K52.9 GASTROENTERITIS: Status: RESOLVED | Noted: 2020-08-02 | Resolved: 2024-02-21

## 2024-02-21 PROBLEM — B96.20 E-COLI UTI: Status: RESOLVED | Noted: 2017-10-11 | Resolved: 2024-02-21

## 2024-02-21 PROBLEM — N39.0 E-COLI UTI: Status: RESOLVED | Noted: 2017-10-11 | Resolved: 2024-02-21

## 2024-06-18 DIAGNOSIS — R00.0 TACHYCARDIA: ICD-10-CM

## 2024-06-18 RX ORDER — METOPROLOL SUCCINATE 25 MG/1
25 TABLET, EXTENDED RELEASE ORAL DAILY
Qty: 90 TABLET | Refills: 0 | Status: SHIPPED | OUTPATIENT
Start: 2024-06-18

## 2024-09-06 ENCOUNTER — OFFICE VISIT (OUTPATIENT)
Dept: CARDIOLOGY CLINIC | Facility: CLINIC | Age: 79
End: 2024-09-06
Payer: MEDICARE

## 2024-09-06 VITALS
SYSTOLIC BLOOD PRESSURE: 130 MMHG | WEIGHT: 149 LBS | HEIGHT: 60 IN | BODY MASS INDEX: 29.25 KG/M2 | DIASTOLIC BLOOD PRESSURE: 80 MMHG | OXYGEN SATURATION: 93 % | HEART RATE: 67 BPM

## 2024-09-06 DIAGNOSIS — I20.9 ANGINA PECTORIS (HCC): ICD-10-CM

## 2024-09-06 DIAGNOSIS — I10 BENIGN ESSENTIAL HYPERTENSION: ICD-10-CM

## 2024-09-06 DIAGNOSIS — R00.0 TACHYCARDIA: Primary | ICD-10-CM

## 2024-09-06 DIAGNOSIS — E78.2 MIXED HYPERLIPIDEMIA: ICD-10-CM

## 2024-09-06 DIAGNOSIS — E03.9 HYPOTHYROIDISM, UNSPECIFIED TYPE: ICD-10-CM

## 2024-09-06 PROCEDURE — 93000 ELECTROCARDIOGRAM COMPLETE: CPT | Performed by: PHYSICIAN ASSISTANT

## 2024-09-06 PROCEDURE — 99213 OFFICE O/P EST LOW 20 MIN: CPT | Performed by: PHYSICIAN ASSISTANT

## 2024-09-06 NOTE — PROGRESS NOTES
Progress Note - Cardiology Office  [facility]    Xiomara Marquez 79 y.o. adult MRN: [id number]  : 1945  Encounter: [id number]      Assessment:     Coronary artery disease.  Essential hypertension.  Dyslipidemia.  Hypothyroidism.  GERD.  Gastritis.  Anxiety.      Discussion Summary and Plan:    Coronary artery disease.  - Stable, patient denies chest pain.   - Prior cardiology notes indicate that patient had cardiac catheterization in  and  which noted mild nonobstructive CAD.  - 23 nuclear stress test: Stress Combined Conclusion- The ECG and SPECT imaging portions of the stress study are discordant but are nonetheless concerning for inducible myocardial ischemia given the known limited sensitivity of pharmacologic stress electrocardiography. Ideal imaging could not be obtained since patient's right arm had to be on the side, prone images could not be obtained and there was increased subdiaphragmatic tracer uptake.   - 23 Dr. Renteria (patient's primary cardiologist) -stress test normal.  - Currently on Toprol-XL 25 mg daily.  - Currently on Lipitor 10 mg daily.  - Patient reports allergy to aspirin.    Essential hypertension.  - BP during today's office visit, 130/80.   - Currently on Toprol-XL 25 mg daily.  - 20 TTE: LVEF 55 to 60%.  Mild mitral valve regurgitation.  Trace aortic valve regurgitation.  Mild tricuspid valve regurgitation.    Dyslipidemia.  - Currently on Lipitor 10 mg daily.  - No recent lipid panel in chart. Ordered.     Hypothyroidism.  - Care per PCP.     GERD.  - Follows outpatient with [facility].     Gastritis.  - 23 EGD: Mild, localized erythematous mucosa in the antrum, consistent with gastritis; performed cold forceps biopsy to rule out H. Pylori.   - Follows outpatient with [facility].     Anxiety.  - Care per PCP.       Patient / Caretaker was advised and educated to call our office  immediately if  patient has any new  symptoms of chest pain/shortness of breath, near-syncope, syncope, light headedness sustained palpitations  or any other cardiovascular symptoms before their scheduled follow-up appointment.  Office number was provided #[phone].  Please call [phone] if any questions.  Counseling :  A description of the counseling.  Goals and Barriers.  Patient's ability to self care: Yes  Medication side effect reviewed with patient in detail and all their questions answered to their satisfaction.    HPI :     Xiomara Marquez is a 79 y.o. female with PMHx of CAD, essential hypertension, dyslipidemia, hypothyroidism, GERD, gastritis, anxiety, who presents for routine outpatient cardiology follow-up.     Patient was last seen outpatient cardiology office on 11/30/23.  During last outpatient cardiology office visit patient had reported some left-sided chest discomfort.  Chest pain was documented having no relationship with any activity.  Patient was scheduled for nuclear stress test which was overall unremarkable.     Patient states that she is overall doing well.  She does state that she did have an episode of chest discomfort a few weeks ago while cleaning her home.  She describes it as a sharp like sensation in the middle of her chest without radiation.  She states it only lasted a few moments before resolving.  She denies any other symptoms such as palpitations, shortness of breath, lightheadedness, dizziness, headache, nausea vomiting or diaphoresis.  She states that she has not had any further episodes.        Review of Systems   All other systems reviewed and are negative.      Historical Information   Past Medical History:   Diagnosis Date    Allergic rhinitis     Last Assessed:10/22/2014    Alzheimer disease (HCC)     Angina pectoris (HCC)     Ankle fracture, right     casted    Anxiety     Arthritis     Asthma     Cisneros esophagus     Bursitis     Cataracts, bilateral     scheduled for surgery    Cervical herniated  disc 04/04/2022    Colon polyp     Constipation     Constipation     COPD (chronic obstructive pulmonary disease) ([facility])     does get SOB walking up stairs per patient-states she has phlegm today, cough 1/4/23    Coronary artery disease     Cystitis     chronic    Dementia ([facility]) 04/04/2022    short term memory loss    Disease of thyroid gland     hypothyroid    Diverticulosis     Fasciculations     Fibromyalgia     Fibromyalgia, primary     GERD (gastroesophageal reflux disease)     patient states she gets alot of heartburn-takes Protonix and Pepcid.  Also has solid food regurgitation at times as well as liquids.  1/4/23    H/o Lyme disease     Heart murmur     Hiatal hernia     History of Clostridium difficile infection     Hyper reflexia     Hyperlipidemia     Hypertension     on 4/4/22 pt denies having HTN    Hypoglycemia     patient states she feels well today regarding blood sugar.    Hypothyroid     hypo    IBS (irritable bowel syndrome)     Labral tear of shoulder     left    Lichen sclerosus of female genitalia 2016    Murmur     Neck pain, chronic     gets steroid injections-none since late 2016    Neuralgia     Oral lichen planus     Peritonitis ([facility])     Spinal stenosis     lumbar; has cervical pain as well at times.  1/4/23    Spinal stenosis     Tachycardia 04/04/2022    at times    Ulcer     Ulcer of gastric fundus     Vertigo     Wears glasses     Wears partial dentures     upper and lower     Past Surgical History:   Procedure Laterality Date    ABDOMINAL SURGERY      exploratory-removal of appendix    APPENDECTOMY      CARDIAC CATHETERIZATION      CARPAL TUNNEL RELEASE Bilateral     CHOLECYSTECTOMY      COLONOSCOPY      sigmoid diverticulosis,5mm rectal tubular adenoma5/06    DILATION AND CURETTAGE OF UTERUS      x3 miscarriages    EGD      FOOT SURGERY Right     5th toe-hammertoe repair    FOOT SURGERY      HIATAL HERNIA REPAIR      HYSTERECTOMY  01/09/2012    complete ; for bleeding,tubal  "ligation,vaginal prolapse and rectocele repair    OVARIAN CYST REMOVAL      DE COLONOSCOPY FLX DX W/COLLJ SPEC WHEN PFRMD N/A 10/23/2017    Procedure: EGD AND COLONOSCOPY;  Surgeon: Elton Noonan MD;  [facility]: [facility];  Service: Gastroenterology    DE SURGICAL ARTHROSCOPY WILLIAN W/CORACOACRM LIGM RLS Left 11/13/2017    Procedure: SHOULDER ARTHROSCOPY WITH SUBACROMIAL DECOMPRESSION, ROTATOR CUFF REPAIR;  Surgeon: Raúl Farfan MD;  Location: [facility] MAIN OR;  Service: Orthopedics    ROTATOR CUFF REPAIR Right     TONSILLECTOMY       Social History     Substance and Sexual Activity   Alcohol Use No    Comment: stopped drinking in 2012     Social History     Substance and Sexual Activity   Drug Use Never     Social History     Tobacco Use   Smoking Status Never   Smokeless Tobacco Never     Family History:   Family History   Problem Relation Age of Onset    Cancer Mother         colon    Ulcerative colitis Mother     Alzheimer's disease Father     Heart disease Sister         MI x4-angioplasty x4 stents    Colonic polyp Sister     Ulcerative colitis Sister     Asthma Sister     COPD Sister     Cancer Brother         brain tumor-age 11    Cancer Brother 65        prostate    Arthritis Family     Osteoarthritis Family     Cancer Other         colon cancer    Crohn's disease Neg Hx     Liver cancer Neg Hx        Meds/Allergies     Allergies   Allergen Reactions    Omnipaque [Iohexol] Other (See Comments)     Shakes, \"passed out\"    Pneumovax 23 [Pneumococcal Vac Polyvalent] Hives    Iodine - Food Allergy     Iv Dye  [Iodinated Contrast Media] Confusion     Annotation - 25Sep2012: shaking    Aspirin GI Intolerance and Nausea Only     Reaction Date: 29May2012; Annotation - 04Sep2012: 325mg causes bleeding  Cannot take a dose higher than 81mg-pt has a hx of ulcer    Codeine GI Intolerance     N/V    Flexeril [Cyclobenzaprine]      Unknown reaction per pt. Allergy was noted on physicians note    Latex Rash    Other " Rash     Adhesive Tape-caused a rash       Current Outpatient Medications:     atorvastatin (LIPITOR) 10 mg tablet, Take 10 mg by mouth daily , Disp: , Rfl:     celecoxib (CeleBREX) 100 mg capsule, Take 100 mg by mouth daily, Disp: , Rfl:     donepezil (ARICEPT) 10 mg tablet, Take 10 mg by mouth daily at bedtime , Disp: , Rfl: 5    ferrous sulfate 325 (65 FE) MG EC tablet, Take 1 tablet by mouth daily, Disp: , Rfl:     ipratropium (ATROVENT) 0.03 % nasal spray, USE 2 SPRAYS INTO EACH NOSTRIL EVERY 12 (TWELVE) HOURS, Disp: 30 mL, Rfl: 6    levothyroxine 100 mcg tablet, Take 100 mcg by mouth daily, Disp: , Rfl:     levothyroxine 88 mcg tablet, TAKE 1 TABLET ONCE EVERY DAY, Disp: , Rfl:     metoprolol succinate (TOPROL-XL) 25 mg 24 hr tablet, TAKE 1 TABLET (25 MG TOTAL) BY MOUTH DAILY, Disp: 90 tablet, Rfl: 0    naproxen (NAPROSYN) 500 mg tablet, Take 500 mg by mouth 2 (two) times a day, Disp: , Rfl:     oxyCODONE-acetaminophen (PERCOCET) 5-325 mg per tablet, TAKE 1 TABLET BY MOUTH EVERY 6 (SIX) HOURS AS NEEDED FOR SEVERE PAIN (PAIN SCORE 7-10). MAX DAILY AMOUNT  4 TABLETS, Disp: , Rfl:     pantoprazole (PROTONIX) 40 mg tablet, TAKE ONE (1) TABLET BY MOUTH DAILY, Disp: 30 tablet, Rfl: 2    PARoxetine (PAXIL) 10 mg tablet, Take 10 mg by mouth daily , Disp: , Rfl:     QUEtiapine (SEROquel) 25 mg tablet, Take 25 mg by mouth daily at bedtime, Disp: , Rfl:     famotidine (PEPCID) 40 MG tablet, TAKE 1 TABLET (40 MG TOTAL) BY MOUTH DAILY AT BEDTIME, Disp: 30 tablet, Rfl: 5    ondansetron (ZOFRAN) 4 mg tablet, Take 1 tablet (4 mg total) by mouth every 6 (six) hours for 12 days, Disp: 12 tablet, Rfl: 0    Vitals: Blood pressure 130/80, pulse 67, height 5' (1.524 m), weight 67.6 kg (149 lb), SpO2 93%, not currently breastfeeding.    Body mass index is 29.1 kg/m².  Wt Readings from Last 3 Encounters:   09/06/24 67.6 kg (149 lb)   11/30/23 69.4 kg (153 lb)   07/31/23 64.4 kg (142 lb)     Vitals:    09/06/24 1134   Weight: 67.6 kg  (149 lb)     BP Readings from Last 3 Encounters:   24 130/80   23 138/90   23 140/74       Physical Exam:  Physical Exam  Vitals reviewed.   Constitutional:       General: She is not in acute distress.  Cardiovascular:      Rate and Rhythm: Normal rate and regular rhythm.      Pulses: Normal pulses.      Heart sounds: No murmur heard.  Pulmonary:      Effort: Pulmonary effort is normal. No respiratory distress.      Breath sounds: Normal breath sounds.   Abdominal:      General: Abdomen is flat. There is no distension.      Palpations: Abdomen is soft.      Tenderness: There is no abdominal tenderness.   Musculoskeletal:      Right lower leg: No edema.      Left lower leg: No edema.   Skin:     General: Skin is warm and dry.   Neurological:      Mental Status: She is alert and oriented to person, place, and time.       Diagnostic Studies Review Cardio:      EK/06/24 EKG: Sinus rhythm, 67 bpm.  Minimal voltage criteria for LVH, may be normal variant.    Cardiac testing:   Results for orders placed during the hospital encounter of 23    NM myocardial perfusion spect (rx stress and/or rest)    Interpretation Summary    Stress Combined Conclusion: The ECG and SPECT imaging portions of the stress study are discordant but are nonetheless concerning for inducible myocardial ischemia given the known limited sensitivity of pharmacologic stress electrocardiography. Ideal imaging could not be obtained since patient's right arm had to be on the side, prone images could not be obtained and there was increased subdiaphragmatic tracer uptake.    The stress ECG is negative for ischemia after vasodilation and low level exercise, without reproduction of symptoms.    Perfusion: There is a left ventricular perfusion defect that is small to medium in size present in the apical to mid lateral location that is reversible.  Summed stress score is 9 and differential score is 8. Ideal imaging could not be  obtained since patient's right arm had to be on the side, prone images could not be obtained and there was increased subdiaphragmatic tracer uptake.    Stress Function: Left ventricular function post-stress is normal.  Calculated ejection fraction is 89%.    Perfusion Defect Conclusion: There is no evidence of transient ischemic dilation (TID).      Results for orders placed during the hospital encounter of 20    Echo complete with contrast if indicated    Narrative  [address] [id number] [phone]    Transthoracic Echocardiogram  2D, M-mode, Doppler, and Color Doppler    Study date:  2020    Patient: BENEDICT FRANCISCO  MR number: [id number]  Account number: [id number]  : 1945  Age: 75 years  Gender: Female  Status: Outpatient  Location: Echo lab  Height: 60 in  Weight: 156 lb  BP: 120/ 60 mmHg    Indications: Hypertension    Diagnoses: I10. - Essential (primary) hypertension    Sonographer:  TRACI Perdomo  Primary Physician:  Alyse Shrestha MD  Referring Physician:  Polo Renteria MD  Group:  [facility]  Interpreting Physician:  Polo Renteria MD    SUMMARY    LEFT VENTRICLE:  Systolic function was normal. Ejection fraction was estimated in the range of 55 % to 60 % to be 60 %.  There were no regional wall motion abnormalities.  Wall thickness was mildly increased.  There was mild borderline concentric hypertrophy.    MITRAL VALVE:  There was mild annular calcification.  There was mild regurgitation.    AORTIC VALVE:  There was trace regurgitation.    TRICUSPID VALVE:  There was mild regurgitation.  Estimated peak PA pressure was 30 mmHg.    HISTORY: PRIOR HISTORY: Asthma,CAD,GERD,HLD,HTN,Tachycardia,Chest pain,Murmur    PROCEDURE: The procedure was performed in the echo lab. This was a routine study. The transthoracic approach was used. The study included complete 2D imaging, M-mode, complete spectral  Doppler, and color Doppler. The heart rate was 58 bpm,  at the start of the study. Echocardiographic views were limited due to lung interference. Image quality was adequate.    LEFT VENTRICLE: Size was normal. Systolic function was normal. Ejection fraction was estimated in the range of 55 % to 60 % to be 60 %. There were no regional wall motion abnormalities. Wall thickness was mildly increased. There was mild  borderline concentric hypertrophy. DOPPLER: Left ventricular diastolic function parameters were normal for the patient's age.    RIGHT VENTRICLE: The size was normal. Systolic function was normal.    LEFT ATRIUM: Size was normal.    RIGHT ATRIUM: Size was normal.    MITRAL VALVE: There was mild annular calcification. There was normal leaflet separation. DOPPLER: The transmitral velocity was within the normal range. There was no evidence for stenosis. There was mild regurgitation.    AORTIC VALVE: The valve was trileaflet. Leaflets exhibited mildly increased thickness, mild calcification, and normal cuspal separation. DOPPLER: Transaortic velocity was within the normal range. There was no evidence for stenosis. There  was trace regurgitation.    TRICUSPID VALVE: DOPPLER: There was mild regurgitation. Estimated peak PA pressure was 30 mmHg.    PULMONIC VALVE: DOPPLER: There was no significant regurgitation.    PERICARDIUM: There was no thickening or calcification. There was no pericardial effusion.    AORTA: The root exhibited normal size.    SYSTEMIC VEINS: IVC: The inferior vena cava was normal in size. Respirophasic changes were normal.    SYSTEM MEASUREMENT TABLES    2D  Ao Diam: 3.08 cm  LA Area: 12.98 cm2  LA Diam: 3.06 cm  LVEDV MOD A4C: 56.85 ml  LVEF MOD A4C: 60.35 %  LVESV MOD A4C: 22.54 ml  LVLd A4C: 7.08 cm  LVLs A4C: 5.78 cm  RA Area: 15.46 cm2  RVIDd: 3.44 cm  SV MOD A4C: 34.31 ml    PW  MV E/A Ratio: 1.28    [employer] Accredited Echocardiography Laboratory    Prepared and  electronically signed by    Polo Renteria MD  Signed 02-Nov-2020 10:13:24      Lab Review   Lab Results   Component Value Date    WBC 7.92 08/02/2020    HGB 11.5 02/23/2024    HCT 33.8 (L) 02/23/2024    MCV 77 (L) 08/02/2020    RDW 18.1 (H) 08/02/2020     08/02/2020     BMP:  Lab Results   Component Value Date    SODIUM 141 02/23/2024    K 4.2 02/23/2024     02/23/2024    CO2 25 02/23/2024    ANIONGAP 13.4 12/28/2015    BUN 15 02/23/2024    CREATININE 0.66 02/23/2024    GLUC 135 (H) 02/23/2024    GLUF 108 (H) 03/13/2018    CALCIUM 8.7 02/23/2024    EGFR 89 02/23/2024     LFT:  Lab Results   Component Value Date    AST 35 08/02/2020    ALT 30 08/02/2020    ALKPHOS 115 08/02/2020    TP 8.0 08/02/2020    ALB 4.0 08/02/2020      Lab Results   Component Value Date    RCM6FNEOLPSV 4.508 (H) 08/02/2020     Lipid Profile:   Lab Results   Component Value Date    CHOLESTEROL 170 03/13/2018    HDL 45 03/13/2018    LDLCALC 88 03/13/2018    TRIG 183 (H) 03/13/2018     Mariama Martin PA-C

## 2024-11-21 DIAGNOSIS — R00.0 TACHYCARDIA: ICD-10-CM

## 2024-11-21 RX ORDER — METOPROLOL SUCCINATE 25 MG/1
25 TABLET, EXTENDED RELEASE ORAL DAILY
Qty: 90 TABLET | Refills: 1 | Status: SHIPPED | OUTPATIENT
Start: 2024-11-21

## 2024-11-28 ENCOUNTER — HOSPITAL ENCOUNTER (EMERGENCY)
Facility: HOSPITAL | Age: 79
Discharge: HOME | End: 2024-11-28
Attending: EMERGENCY MEDICINE
Payer: MEDICARE

## 2024-11-28 VITALS
OXYGEN SATURATION: 96 % | SYSTOLIC BLOOD PRESSURE: 192 MMHG | TEMPERATURE: 98.3 F | DIASTOLIC BLOOD PRESSURE: 92 MMHG | WEIGHT: 147 LBS | BODY MASS INDEX: 28.71 KG/M2 | RESPIRATION RATE: 18 BRPM | HEART RATE: 74 BPM

## 2024-11-28 DIAGNOSIS — L29.9 ITCHING: Primary | ICD-10-CM

## 2024-11-28 DIAGNOSIS — R21 RASH AND NONSPECIFIC SKIN ERUPTION: ICD-10-CM

## 2024-11-28 PROCEDURE — 99284 EMERGENCY DEPT VISIT MOD MDM: CPT

## 2024-11-28 PROCEDURE — 99283 EMERGENCY DEPT VISIT LOW MDM: CPT | Performed by: EMERGENCY MEDICINE

## 2024-11-28 NOTE — DISCHARGE INSTRUCTIONS
I recommend that you take 10mg Zyrtec (cetirizine) 1-2 times daily for itching and possible allergic reaction. You can also apply a hydrocortisone cream to the affected area twice daily. This is a topical steroid that is available over the counter. If your skin is dry in between  hydrocortisone applications you can use a bland unscented over the counter lotion or ointment such as Eucerin or aquaphor to reduce dryness which can make the itching worse.

## 2024-11-28 NOTE — ED PROVIDER NOTES
Time reflects when diagnosis was documented in both MDM as applicable and the Disposition within this note       Time User Action Codes Description Comment    11/28/2024  8:54 AM Lakeisha Rose [L29.9] Itching     11/28/2024  8:54 AM Lakeisha Rose [R21] Rash and nonspecific skin eruption           ED Disposition       ED Disposition   Discharge    Condition   Stable    Date/Time   Thu Nov 28, 2024  8:54 AM    Comment   Xiomara A Shillis discharge to home/self care.                   Assessment & Plan       Medical Decision Making  79-year-old female presenting with itching of bilateral hands and arms since yesterday.  Reports mild associated red rash although she noticed the itching first.  She denies touching any new substances or starting any new medications.  She did have a steroid injection in her shoulder 1 week ago.  No fever or chills.  No symptoms in her mouth or throat.  No shortness of breath.  She is well-appearing.  Hypertensive with otherwise normal vitals.  On exam she has mildly dry appearing skin with some erythema primarily from scratching on her hands minimal to no appreciable underlying rash.  No mucosal involvement.  Advised the patient that this could be a possible mild allergic versus irritant reaction to something she touched or be related to dry skin.  Advised taking Zyrtec and hydrocortisone cream as needed as well as moisturizing frequently.             Medications - No data to display    ED Risk Strat Scores                           SBIRT 20yo+      Flowsheet Row Most Recent Value   Initial Alcohol Screen: US AUDIT-C     1. How often do you have a drink containing alcohol? 0 Filed at: 11/28/2024 0837   2. How many drinks containing alcohol do you have on a typical day you are drinking?  0 Filed at: 11/28/2024 0837   3a. Male UNDER 65: How often do you have five or more drinks on one occasion? 0 Filed at: 11/28/2024 0837   3b. FEMALE Any Age, or MALE 65+: How often do you have 4 or  "more drinks on one occassion? 0 Filed at: 11/28/2024 0837   Audit-C Score 0 Filed at: 11/28/2024 0837   LUKE: How many times in the past year have you...    Used an illegal drug or used a prescription medication for non-medical reasons? Never Filed at: 11/28/2024 0837                            History of Present Illness       Chief Complaint   Patient presents with    Itching     Pt reports \"got a needle in my left shoulder about a week ago and I think the itching might be from that\"  generalized itching started yesterday.  Reports mild skin redness       Past Medical History:   Diagnosis Date    Allergic rhinitis     Last Assessed:10/22/2014    Alzheimer disease ([facility])     Angina pectoris ([facility])     Ankle fracture, right     casted    Anxiety     Arthritis     Asthma     Cisneros esophagus     Bursitis     Cataracts, bilateral     scheduled for surgery    Cervical herniated disc 04/04/2022    Colon polyp     Constipation     Constipation     COPD (chronic obstructive pulmonary disease) ([facility])     does get SOB walking up stairs per patient-states she has phlegm today, cough 1/4/23    Coronary artery disease     Cystitis     chronic    Dementia ([facility]) 04/04/2022    short term memory loss    Disease of thyroid gland     hypothyroid    Diverticulosis     Fasciculations     Fibromyalgia     Fibromyalgia, primary     GERD (gastroesophageal reflux disease)     patient states she gets alot of heartburn-takes Protonix and Pepcid.  Also has solid food regurgitation at times as well as liquids.  1/4/23    H/o Lyme disease     Heart murmur     Hiatal hernia     History of Clostridium difficile infection     Hyper reflexia     Hyperlipidemia     Hypertension     on 4/4/22 pt denies having HTN    Hypoglycemia     patient states she feels well today regarding blood sugar.    Hypothyroid     hypo    IBS (irritable bowel syndrome)     Labral tear of shoulder     left    Lichen sclerosus of female genitalia 2016    Murmur     Neck pain, " chronic     gets steroid injections-none since late 2016    Neuralgia     Oral lichen planus     Peritonitis ([facility])     Spinal stenosis     lumbar; has cervical pain as well at times.  1/4/23    Spinal stenosis     Tachycardia 04/04/2022    at times    Ulcer     Ulcer of gastric fundus     Vertigo     Wears glasses     Wears partial dentures     upper and lower      Past Surgical History:   Procedure Laterality Date    ABDOMINAL SURGERY      exploratory-removal of appendix    APPENDECTOMY      CARDIAC CATHETERIZATION      CARPAL TUNNEL RELEASE Bilateral     CHOLECYSTECTOMY      COLONOSCOPY      sigmoid diverticulosis,5mm rectal tubular adenoma5/06    DILATION AND CURETTAGE OF UTERUS      x3 miscarriages    EGD      FOOT SURGERY Right     5th toe-hammertoe repair    FOOT SURGERY      HIATAL HERNIA REPAIR      HYSTERECTOMY  01/09/2012    complete ; for bleeding,tubal ligation,vaginal prolapse and rectocele repair    OVARIAN CYST REMOVAL      [place] COLONOSCOPY FLX DX W/COLLJ SPEC WHEN PFRMD N/A 10/23/2017    Procedure: EGD AND COLONOSCOPY;  Surgeon: Elton Noonan MD;  Location: [facility] GI LAB;  Service: Gastroenterology    TX SURGICAL ARTHROSCOPY WILLIAN W/CORACOACRM LIGM RLS Left 11/13/2017    Procedure: SHOULDER ARTHROSCOPY WITH SUBACROMIAL DECOMPRESSION, ROTATOR CUFF REPAIR;  Surgeon: Raúl Farfan MD;  Location: [facility] MAIN OR;  Service: Orthopedics    ROTATOR CUFF REPAIR Right     TONSILLECTOMY        Family History   Problem Relation Age of Onset    Cancer Mother         colon    Ulcerative colitis Mother     Alzheimer's disease Father     Heart disease Sister         MI x4-angioplasty x4 stents    Colonic polyp Sister     Ulcerative colitis Sister     Asthma Sister     COPD Sister     Cancer Brother         brain tumor-age 11    Cancer Brother 65        prostate    Arthritis Family     Osteoarthritis Family     Cancer Other         colon cancer    Crohn's disease Neg Hx     Liver cancer Neg Hx       Social History      Tobacco Use    Smoking status: Never    Smokeless tobacco: Never   Vaping Use    Vaping status: Never Used   Substance Use Topics    Alcohol use: No     Comment: stopped drinking in 2012    Drug use: Never      E-Cigarette/Vaping    E-Cigarette Use Never User       E-Cigarette/Vaping Substances    Nicotine No     THC No     CBD No     Flavoring No     Other No     Unknown No       I have reviewed and agree with the history as documented.     79-year-old female presenting with itching of bilateral hands and arms since yesterday.  Reports mild associated red rash although she noticed the itching first.  She denies touching any new substances or starting any new medications.  She did have a steroid injection in her shoulder 1 week ago.  No fever or chills.  No symptoms in her mouth or throat.  No shortness of breath.         Review of Systems   All other systems reviewed and are negative.          Objective       ED Triage Vitals [11/28/24 0834]   Temperature Pulse Blood Pressure Respirations SpO2 Patient Position - Orthostatic VS   98.3 °F (36.8 °C) 74 (!) 192/92 18 96 % Sitting      Temp Source Heart Rate Source BP Location FiO2 (%) Pain Score    Oral Monitor Left arm -- No Pain      Vitals      Date and Time Temp Pulse SpO2 Resp BP Pain Score FACES Pain Rating User   11/28/24 0834 98.3 °F (36.8 °C) 74 96 % 18 192/92 No Pain -- RR            Physical Exam  Constitutional:       General: She is not in acute distress.  HENT:      Mouth/Throat:      Mouth: Mucous membranes are moist.      Pharynx: Oropharynx is clear.   Eyes:      Conjunctiva/sclera: Conjunctivae normal.   Cardiovascular:      Rate and Rhythm: Normal rate and regular rhythm.   Pulmonary:      Effort: Pulmonary effort is normal.   Musculoskeletal:         General: Normal range of motion.      Cervical back: Neck supple.   Skin:     General: Skin is warm and dry.      Comments: Mild dryness and erythema from excoriations to dorsal hands up arms no  crusting or well-demarcated areas of erythema, no tenderness or warmth.  No swelling   Neurological:      General: No focal deficit present.      Mental Status: She is alert and oriented to person, place, and time.   Psychiatric:         Mood and Affect: Mood normal.         Behavior: Behavior normal.         Results Reviewed       None            No orders to display       Procedures    ED Medication and Procedure Management   Prior to Admission Medications   Prescriptions Last Dose Informant Patient Reported? Taking?   PARoxetine (PAXIL) 10 mg tablet  Self Yes No   Sig: Take 10 mg by mouth daily    QUEtiapine (SEROquel) 25 mg tablet  Self Yes No   Sig: Take 25 mg by mouth daily at bedtime   atorvastatin (LIPITOR) 10 mg tablet  Self Yes No   Sig: Take 10 mg by mouth daily    celecoxib (CeleBREX) 100 mg capsule   Yes No   Sig: Take 100 mg by mouth daily   donepezil (ARICEPT) 10 mg tablet  Self Yes No   Sig: Take 10 mg by mouth daily at bedtime    famotidine (PEPCID) 40 MG tablet   No No   Sig: TAKE 1 TABLET (40 MG TOTAL) BY MOUTH DAILY AT BEDTIME   ferrous sulfate 325 (65 FE) MG EC tablet  Self Yes No   Sig: Take 1 tablet by mouth daily   ipratropium (ATROVENT) 0.03 % nasal spray   No No   Sig: USE 2 SPRAYS INTO EACH NOSTRIL EVERY 12 (TWELVE) HOURS   levothyroxine 100 mcg tablet  Self Yes No   Sig: Take 100 mcg by mouth daily   levothyroxine 88 mcg tablet   Yes No   Sig: TAKE 1 TABLET ONCE EVERY DAY   metoprolol succinate (TOPROL-XL) 25 mg 24 hr tablet   No No   Sig: TAKE 1 TABLET (25 MG TOTAL) BY MOUTH DAILY   naproxen (NAPROSYN) 500 mg tablet   Yes No   Sig: Take 500 mg by mouth 2 (two) times a day   ondansetron (ZOFRAN) 4 mg tablet   No No   Sig: Take 1 tablet (4 mg total) by mouth every 6 (six) hours for 12 days   oxyCODONE-acetaminophen (PERCOCET) 5-325 mg per tablet   Yes No   Sig: TAKE 1 TABLET BY MOUTH EVERY 6 (SIX) HOURS AS NEEDED FOR SEVERE PAIN (PAIN SCORE 7-10). MAX DAILY AMOUNT  4 TABLETS   pantoprazole  (PROTONIX) 40 mg tablet  Self No No   Sig: TAKE ONE (1) TABLET BY MOUTH DAILY      Facility-Administered Medications: None     Patient's Medications   Discharge Prescriptions    No medications on file     No discharge procedures on file.  ED SEPSIS DOCUMENTATION   Time reflects when diagnosis was documented in both MDM as applicable and the Disposition within this note       Time User Action Codes Description Comment    11/28/2024  8:54 AM Lakeisha Rose [L29.9] Itching     11/28/2024  8:54 AM Lakeisha Rose [R21] Rash and nonspecific skin eruption                  Lakeisha Rose MD  11/28/24 0903

## 2025-02-04 DIAGNOSIS — R00.0 TACHYCARDIA: ICD-10-CM

## 2025-02-05 RX ORDER — METOPROLOL SUCCINATE 25 MG/1
25 TABLET, EXTENDED RELEASE ORAL DAILY
Qty: 90 TABLET | Refills: 1 | Status: SHIPPED | OUTPATIENT
Start: 2025-02-05

## 2025-02-19 ENCOUNTER — HOSPITAL ENCOUNTER (EMERGENCY)
Facility: HOSPITAL | Age: 80
Discharge: HOME | End: 2025-02-19
Attending: EMERGENCY MEDICINE | Admitting: EMERGENCY MEDICINE
Payer: MEDICARE

## 2025-02-19 VITALS
SYSTOLIC BLOOD PRESSURE: 180 MMHG | OXYGEN SATURATION: 95 % | WEIGHT: 150 LBS | DIASTOLIC BLOOD PRESSURE: 103 MMHG | TEMPERATURE: 97.8 F | HEIGHT: 60 IN | RESPIRATION RATE: 18 BRPM | HEART RATE: 81 BPM | BODY MASS INDEX: 29.45 KG/M2

## 2025-02-19 DIAGNOSIS — S05.01XA ABRASION OF RIGHT CORNEA, INITIAL ENCOUNTER: ICD-10-CM

## 2025-02-19 DIAGNOSIS — H57.11 EYE PAIN, RIGHT: Primary | ICD-10-CM

## 2025-02-19 PROCEDURE — 99283 EMERGENCY DEPT VISIT LOW MDM: CPT

## 2025-02-19 PROCEDURE — 99284 EMERGENCY DEPT VISIT MOD MDM: CPT | Performed by: EMERGENCY MEDICINE

## 2025-02-19 RX ORDER — TETRACAINE HYDROCHLORIDE 5 MG/ML
1 SOLUTION OPHTHALMIC ONCE
Status: COMPLETED | OUTPATIENT
Start: 2025-02-19 | End: 2025-02-19

## 2025-02-19 RX ORDER — ERYTHROMYCIN 5 MG/G
0.5 OINTMENT OPHTHALMIC ONCE
Status: COMPLETED | OUTPATIENT
Start: 2025-02-19 | End: 2025-02-19

## 2025-02-19 RX ORDER — CIPROFLOXACIN HYDROCHLORIDE 3.5 MG/ML
1 SOLUTION/ DROPS TOPICAL ONCE
Status: COMPLETED | OUTPATIENT
Start: 2025-02-19 | End: 2025-02-19

## 2025-02-19 RX ORDER — OXYCODONE HYDROCHLORIDE 5 MG/1
5 TABLET ORAL EVERY 6 HOURS PRN
Qty: 8 TABLET | Refills: 0 | Status: SHIPPED | OUTPATIENT
Start: 2025-02-19 | End: 2025-03-01

## 2025-02-19 RX ADMIN — FLUORESCEIN SODIUM 1 STRIP: 1 STRIP OPHTHALMIC at 17:38

## 2025-02-19 RX ADMIN — ERYTHROMYCIN 0.5 INCH: 5 OINTMENT OPHTHALMIC at 18:12

## 2025-02-19 RX ADMIN — Medication 2.5 MG: at 18:12

## 2025-02-19 RX ADMIN — CIPROFLOXACIN HYDROCHLORIDE 1 DROP: 3 SOLUTION/ DROPS OPHTHALMIC at 18:37

## 2025-02-19 RX ADMIN — TETRACAINE HYDROCHLORIDE 1 DROP: 5 SOLUTION OPHTHALMIC at 17:38

## 2025-02-19 NOTE — ED PROVIDER NOTES
Time reflects when diagnosis was documented in both MDM as applicable and the Disposition within this note       Time User Action Codes Description Comment    2/19/2025  6:03 PM Skylar Lucio Add [H57.11] Eye pain, right     2/19/2025  6:03 PM Skylar Lucio Add [S05.01XA] Abrasion of right cornea, initial encounter           ED Disposition       ED Disposition   Discharge    Condition   Stable    Date/Time   Wed Feb 19, 2025  6:03 PM    Comment   Xiomara A Shillis discharge to home/self care.                   Assessment & Plan       Medical Decision Making  Pt. With large area of fluorescein uptake c/w abrasion but I couldn't find a FB.  Eye pressures are normal.  Will treat with abx topical and pain medicine prn and advised pt. Needs to see eye doctor tomorrow.    Risk  Prescription drug management.             Medications   ciprofloxacin (CILOXAN) 0.3 % ophthalmic solution 1 drop (has no administration in time range)   tetracaine 0.5 % ophthalmic solution 1 drop (1 drop Right Eye Given 2/19/25 1738)   fluorescein sodium sterile ophthalmic strip 1 strip (1 strip Right Eye Given 2/19/25 1738)   oxyCODONE (ROXICODONE) split tablet 2.5 mg (2.5 mg Oral Given 2/19/25 1812)   erythromycin (ILOTYCIN) 0.5 % ophthalmic ointment 0.5 inch (0.5 inches Right Eye Given 2/19/25 1812)       ED Risk Strat Scores                            SBIRT 22yo+      Flowsheet Row Most Recent Value   Initial Alcohol Screen: US AUDIT-C     1. How often do you have a drink containing alcohol? 0 Filed at: 02/19/2025 1715   2. How many drinks containing alcohol do you have on a typical day you are drinking?  0 Filed at: 02/19/2025 1715   3a. Male UNDER 65: How often do you have five or more drinks on one occasion? 0 Filed at: 02/19/2025 1715   3b. FEMALE Any Age, or MALE 65+: How often do you have 4 or more drinks on one occassion? 0 Filed at: 02/19/2025 1715   Audit-C Score 0 Filed at: 02/19/2025 1715   LUKE: How many times in the past year  have you...    Used an illegal drug or used a prescription medication for non-medical reasons? Never Filed at: 02/19/2025 2445                            History of Present Illness       Chief Complaint   Patient presents with    Eye Pain     Right eye pain since this morning, thought she got something in it but pain won't stop       Past Medical History:   Diagnosis Date    Allergic rhinitis     Last Assessed:10/22/2014    Alzheimer disease ([facility])     Angina pectoris ([facility])     Ankle fracture, right     casted    Anxiety     Arthritis     Asthma     Cisneros esophagus     Bursitis     Cataracts, bilateral     scheduled for surgery    Cervical herniated disc 04/04/2022    Colon polyp     Constipation     Constipation     COPD (chronic obstructive pulmonary disease) ([facility])     does get SOB walking up stairs per patient-states she has phlegm today, cough 1/4/23    Coronary artery disease     Cystitis     chronic    Dementia ([facility]) 04/04/2022    short term memory loss    Disease of thyroid gland     hypothyroid    Diverticulosis     Fasciculations     Fibromyalgia     Fibromyalgia, primary     GERD (gastroesophageal reflux disease)     patient states she gets alot of heartburn-takes Protonix and Pepcid.  Also has solid food regurgitation at times as well as liquids.  1/4/23    H/o Lyme disease     Heart murmur     Hiatal hernia     History of Clostridium difficile infection     Hyper reflexia     Hyperlipidemia     Hypertension     on 4/4/22 pt denies having HTN    Hypoglycemia     patient states she feels well today regarding blood sugar.    Hypothyroid     hypo    IBS (irritable bowel syndrome)     Labral tear of shoulder     left    Lichen sclerosus of female genitalia 2016    Murmur     Neck pain, chronic     gets steroid injections-none since late 2016    Neuralgia     Oral lichen planus     Peritonitis ([facility])     Spinal stenosis     lumbar; has cervical pain as well at times.  1/4/23    Spinal stenosis     Tachycardia  04/04/2022    at times    Ulcer     Ulcer of gastric fundus     Vertigo     Wears glasses     Wears partial dentures     upper and lower      Past Surgical History:   Procedure Laterality Date    ABDOMINAL SURGERY      exploratory-removal of appendix    APPENDECTOMY      CARDIAC CATHETERIZATION      CARPAL TUNNEL RELEASE Bilateral     CHOLECYSTECTOMY      COLONOSCOPY      sigmoid diverticulosis,5mm rectal tubular adenoma5/06    DILATION AND CURETTAGE OF UTERUS      x3 miscarriages    EGD      FOOT SURGERY Right     5th toe-hammertoe repair    FOOT SURGERY      HIATAL HERNIA REPAIR      HYSTERECTOMY  01/09/2012    complete ; for bleeding,tubal ligation,vaginal prolapse and rectocele repair    OVARIAN CYST REMOVAL      [place] COLONOSCOPY FLX DX W/COLLJ SPEC WHEN PFRMD N/A 10/23/2017    Procedure: EGD AND COLONOSCOPY;  Surgeon: Elton Noonan MD;  Location: [facility];  Service: Gastroenterology    VA SURGICAL ARTHROSCOPY WILLIAN W/CORACOACRM IRENEM RLS Left 11/13/2017    Procedure: SHOULDER ARTHROSCOPY WITH SUBACROMIAL DECOMPRESSION, ROTATOR CUFF REPAIR;  Surgeon: Raúl Farfan MD;  Location: [facility] MAIN OR;  Service: Orthopedics    ROTATOR CUFF REPAIR Right     TONSILLECTOMY        Family History   Problem Relation Age of Onset    Cancer Mother         colon    Ulcerative colitis Mother     Alzheimer's disease Father     Heart disease Sister         MI x4-angioplasty x4 stents    Colonic polyp Sister     Ulcerative colitis Sister     Asthma Sister     COPD Sister     Cancer Brother         brain tumor-age 11    Cancer Brother 65        prostate    Arthritis Family     Osteoarthritis Family     Cancer Other         colon cancer    Crohn's disease Neg Hx     Liver cancer Neg Hx       Social History     Tobacco Use    Smoking status: Never    Smokeless tobacco: Never   Vaping Use    Vaping status: Never Used   Substance Use Topics    Alcohol use: No     Comment: stopped drinking in 2012    Drug use: Never       E-Cigarette/Vaping    E-Cigarette Use Never User       E-Cigarette/Vaping Substances    Nicotine No     THC No     CBD No     Flavoring No     Other No     Unknown No       I have reviewed and agree with the history as documented.       Eye Pain      Review of Systems   Eyes:  Positive for pain.           Objective       ED Triage Vitals   Temperature Pulse Blood Pressure Respirations SpO2 Patient Position - Orthostatic VS   02/19/25 1714 02/19/25 1715 02/19/25 1715 02/19/25 1714 02/19/25 1715 --   97.8 °F (36.6 °C) 81 (!) 180/103 18 95 %       Temp src Heart Rate Source BP Location FiO2 (%) Pain Score    -- -- -- -- 02/19/25 1714        10 - Worst Possible Pain      Vitals      Date and Time Temp Pulse SpO2 Resp BP Pain Score FACES Pain Rating User   02/19/25 1812 -- -- -- -- -- 10 - Worst Possible Pain -- CS   02/19/25 1715 -- 81 95 % -- 180/103 -- -- PAM   02/19/25 1714 97.8 °F (36.6 °C) -- -- 18 -- 10 - Worst Possible Pain -- PAM            Physical Exam  Vitals and nursing note reviewed.   Constitutional:       General: She is in acute distress.      Appearance: She is not ill-appearing.   HENT:      Head: Normocephalic and atraumatic.      Nose: Rhinorrhea present.   Eyes:      Extraocular Movements: Extraocular movements intact.      Pupils: Pupils are equal, round, and reactive to light.      Comments: Right eye red/injected, pupils are small bilaterally.  + tearing.  Tetracaine applied with relief of pain.  Lids everted as best I could - pt. Has very short eyelashes and difficult to enma fully and pt. Not very fully cooperative with attempts but I don't see any foreign bodies.  Fluorescein applied and Slit lamp exam done - pt. Has a large oval shaped corneal abrasion medially with 2 smaller circular areas of uptake next to it.  I don't see any dendritic lesions.  I don't see any corneal foreign bodies.  The areas are not heaped up like ulcers.  The pressure was measured at 18-19-18   Neurological:       Mental Status: She is alert.         Results Reviewed       None            No orders to display       Procedures    ED Medication and Procedure Management   Prior to Admission Medications   Prescriptions Last Dose Informant Patient Reported? Taking?   PARoxetine (PAXIL) 10 mg tablet  Self Yes No   Sig: Take 10 mg by mouth daily    QUEtiapine (SEROquel) 25 mg tablet  Self Yes No   Sig: Take 25 mg by mouth daily at bedtime   atorvastatin (LIPITOR) 10 mg tablet  Self Yes No   Sig: Take 10 mg by mouth daily    celecoxib (CeleBREX) 100 mg capsule   Yes No   Sig: Take 100 mg by mouth daily   donepezil (ARICEPT) 10 mg tablet  Self Yes No   Sig: Take 10 mg by mouth daily at bedtime    famotidine (PEPCID) 40 MG tablet   No No   Sig: TAKE 1 TABLET (40 MG TOTAL) BY MOUTH DAILY AT BEDTIME   ferrous sulfate 325 (65 FE) MG EC tablet  Self Yes No   Sig: Take 1 tablet by mouth daily   ipratropium (ATROVENT) 0.03 % nasal spray   No No   Sig: USE 2 SPRAYS INTO EACH NOSTRIL EVERY 12 (TWELVE) HOURS   levothyroxine 100 mcg tablet  Self Yes No   Sig: Take 100 mcg by mouth daily   levothyroxine 88 mcg tablet   Yes No   Sig: TAKE 1 TABLET ONCE EVERY DAY   metoprolol succinate (TOPROL-XL) 25 mg 24 hr tablet   No No   Sig: TAKE 1 TABLET (25 MG TOTAL) BY MOUTH DAILY   naproxen (NAPROSYN) 500 mg tablet   Yes No   Sig: Take 500 mg by mouth 2 (two) times a day   ondansetron (ZOFRAN) 4 mg tablet   No No   Sig: Take 1 tablet (4 mg total) by mouth every 6 (six) hours for 12 days   oxyCODONE-acetaminophen (PERCOCET) 5-325 mg per tablet   Yes No   Sig: TAKE 1 TABLET BY MOUTH EVERY 6 (SIX) HOURS AS NEEDED FOR SEVERE PAIN (PAIN SCORE 7-10). MAX DAILY AMOUNT  4 TABLETS   pantoprazole (PROTONIX) 40 mg tablet  Self No No   Sig: TAKE ONE (1) TABLET BY MOUTH DAILY      Facility-Administered Medications: None     Patient's Medications   Discharge Prescriptions    OXYCODONE (ROXICODONE) 5 IMMEDIATE RELEASE TABLET    Take 1 tablet (5 mg total) by mouth every  6 (six) hours as needed for moderate pain for up to 10 days Max Daily Amount: 20 mg       Start Date: 2/19/2025 End Date: 3/1/2025       Order Dose: 5 mg       Quantity: 8 tablet    Refills: 0     No discharge procedures on file.  ED SEPSIS DOCUMENTATION   Time reflects when diagnosis was documented in both MDM as applicable and the Disposition within this note       Time User Action Codes Description Comment    2/19/2025  6:03 PM Skylar Lucio Add [H57.11] Eye pain, right     2/19/2025  6:03 PM Skylar Lucio Add [S05.01XA] Abrasion of right cornea, initial encounter                  Skylar Lucio MD  02/19/25 6581

## 2025-02-19 NOTE — DISCHARGE INSTRUCTIONS
I don't see a foreign body in the eye or under the eyelids.  The eye pressures are normal.  You have a large corneal abrasion but I don't know what caused it.  You need to be seen by an eye doctor tomorrow - call Dr. Bernardo's office first thing in the morning and let them know you were in the ER and need to be seen.  Apply a thin strip of ointment along the inner lower lid 4 times a day.  Use one drop of the Ciloxan in between the ointment 4 times a day.  Do both at least once more tonight before bed.  You can use cool compresses off and on but no eye patch.  You can use the pain medicine as prescribed - start with 1/2 tab and if that doesn't help can take a whole tab.

## 2025-05-11 NOTE — ED NOTES
Pt to bedside toilet with steady gait reports left shoulder pain decreased to 6/10, voided 100ml yellow urine, ua sent to lab        Alonzo Vital RN  10/06/17 2049 declines

## 2025-06-13 ENCOUNTER — TELEPHONE (OUTPATIENT)
Dept: GASTROENTEROLOGY | Facility: CLINIC | Age: 80
End: 2025-06-13

## 2025-06-13 NOTE — TELEPHONE ENCOUNTER
Pt needs to be scheduled for an Office Visit due to age to discuss Egd for gastric intestinal metaplasia (Dr Cutler pt). Called and spoke to pt and scheduled.

## 2025-06-18 ENCOUNTER — TELEPHONE (OUTPATIENT)
Dept: GASTROENTEROLOGY | Facility: CLINIC | Age: 80
End: 2025-06-18

## 2025-06-18 ENCOUNTER — OFFICE VISIT (OUTPATIENT)
Dept: GASTROENTEROLOGY | Facility: CLINIC | Age: 80
End: 2025-06-18
Payer: MEDICARE

## 2025-06-18 VITALS
HEIGHT: 60 IN | BODY MASS INDEX: 28.66 KG/M2 | HEART RATE: 74 BPM | DIASTOLIC BLOOD PRESSURE: 74 MMHG | SYSTOLIC BLOOD PRESSURE: 156 MMHG | WEIGHT: 146 LBS

## 2025-06-18 DIAGNOSIS — Z86.0100 HX OF COLONIC POLYP: ICD-10-CM

## 2025-06-18 DIAGNOSIS — K21.00 GASTROESOPHAGEAL REFLUX DISEASE WITH ESOPHAGITIS, UNSPECIFIED WHETHER HEMORRHAGE: Primary | ICD-10-CM

## 2025-06-18 DIAGNOSIS — K31.A0 INTESTINAL METAPLASIA OF STOMACH: ICD-10-CM

## 2025-06-18 PROCEDURE — 99214 OFFICE O/P EST MOD 30 MIN: CPT | Performed by: PHYSICIAN ASSISTANT

## 2025-06-18 RX ORDER — SODIUM CHLORIDE, SODIUM LACTATE, POTASSIUM CHLORIDE, CALCIUM CHLORIDE 600; 310; 30; 20 MG/100ML; MG/100ML; MG/100ML; MG/100ML
125 INJECTION, SOLUTION INTRAVENOUS CONTINUOUS
OUTPATIENT
Start: 2025-06-18

## 2025-06-18 NOTE — TELEPHONE ENCOUNTER
Scheduled date of EGD(as of today): 6/24/25  Physician performing EGD: Dr Brink  Location of EGD:   Instructions reviewed with patient by: jorge [facility] at [facility]   Clearances: n/a

## 2025-06-18 NOTE — PROGRESS NOTES
Name: Xiomara Marquez      : 1945      MRN: [id number]  Encounter Provider: Brandi Gil PA-C  Encounter Date: 2025   Encounter department: [facility] GASTROENTEROLOGY [place] [id number]  :  Assessment & Plan  Gastroesophageal reflux disease with esophagitis, unspecified whether hemorrhage  Patient with history of GERD, and she takes pantoprazole as well as famotidine       Intestinal metaplasia of stomach  Patient with intestinal metaplasia in antrum, recommended EGD for follow-up, will schedule at this time  Orders:    EGD; Future    Hx of colonic polyp  Patient with history of colon polyp, recommend colonoscopy            History of Present Illness     Xiomara Marquez is a 79 y.o. female who presents for office visit for consultation for EGD. Thepatient otherwise offers no complaints.  She does have a history of gastroparesis on previous gastric emptying study in  which was severe but she is apparently asymptomatic.  She otherwise had TIF procedure in the past and has had multiple EGDs.  She otherwise denies any complaints at this time other than stating that she had difficulty moving her bowels but then states that she is moving her bowels 2-3 times a day.  Does have history of IBS according to chart.  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  She was recommended to have colonoscopy in , did have history of polyps which were benign hyperplastic polyps.      Review of Systems A complete review of systems is negative other than that noted above in the HPI.      Current Medications[1]  Objective   /74 (BP Location: Left arm, Patient Position: Sitting, Cuff Size: Standard)   Pulse 74   Ht 5' (1.524 m)   Wt 66.2 kg (146 lb)   BMI 28.51 kg/m²     Physical Exam   Gen-alert, nad  Heart-s1/s2  Lungs-CTA b/l  Abd-soft, +bs, NT, ND, no  r/r/g      Lab Results: I personally reviewed relevant lab results.       Results for orders placed during the hospital encounter of 07/31/23    EGD    Impression  Mild abnormal mucosa, consistent with gastritis in the antrum; performed cold forceps biopsy  The upper third of the esophagus, middle third of the esophagus, lower third of the esophagus and GE junction appeared normal. Performed random biopsy for dysplasia screening.  The duodenal bulb, 1st part of the duodenum and 2nd part of the duodenum appeared normal.      RECOMMENDATION:  Await pathology results  Follow up with me in clinic    Continue current medication        Soy Cutler MD  Path-  A. Stomach, antrum, biopsy:  -   Gastric antral mucosa with reactive gastropathy and intestinal metaplasia.   -   Negative for dysplasia.  -   Negative for Helicobacter pylori-type organisms on H&E stain.        B. Esophagus, lower, biopsy:  -   Squamous mucosa with mild reactive changes.   -   Separate scant columnar epithelium, negative for intestinal metaplasia and dysplasia.            Colonoscopy-2022-    IMPRESSION:  1. Multiple colon polyps removed with cold snare and cold biopsy polypectomies  2. Left-sided diverticulosis  3. Internal hemorrhoid    Path-  . Large Intestine, transverse polyp:  - Polypoid colonic mucosa with no significant histopathologic abnormality.  - Negative for dysplasia and malignancy.      D. Large Intestine, descending polyp:  - Polypoid colonic mucosa with no significant histopathologic abnormality.  - Negative for dysplasia and malignancy.       E. Large Intestine, sigmoid polyp:  - Polypoid colonic mucosa with surface hyperplastic changes.  - Negative for dysplasia and malignancy.      F. Rectum, polyps x2:  - Hyperplastic polyps.  - Negative for dysplasia and malignancy.               [1]   Current Outpatient Medications   Medication Sig Dispense Refill    atorvastatin (LIPITOR) 10 mg tablet Take 10 mg by mouth in the  morning.      celecoxib (CeleBREX) 100 mg capsule Take 100 mg by mouth in the morning.      donepezil (ARICEPT) 10 mg tablet Take 10 mg by mouth daily at bedtime  5    levothyroxine 88 mcg tablet       metoprolol succinate (TOPROL-XL) 25 mg 24 hr tablet TAKE 1 TABLET (25 MG TOTAL) BY MOUTH DAILY 90 tablet 1    pantoprazole (PROTONIX) 40 mg tablet TAKE ONE (1) TABLET BY MOUTH DAILY 30 tablet 2    PARoxetine (PAXIL) 10 mg tablet Take 10 mg by mouth in the morning.      QUEtiapine (SEROquel) 25 mg tablet Take 25 mg by mouth daily at bedtime      famotidine (PEPCID) 40 MG tablet TAKE 1 TABLET (40 MG TOTAL) BY MOUTH DAILY AT BEDTIME 30 tablet 5    ferrous sulfate 325 (65 FE) MG EC tablet Take 1 tablet by mouth daily (Patient not taking: Reported on 6/18/2025)      ipratropium (ATROVENT) 0.03 % nasal spray USE 2 SPRAYS INTO EACH NOSTRIL EVERY 12 (TWELVE) HOURS (Patient not taking: Reported on 6/18/2025) 30 mL 6    levothyroxine 100 mcg tablet Take 100 mcg by mouth daily (Patient not taking: Reported on 6/18/2025)      naproxen (NAPROSYN) 500 mg tablet Take 500 mg by mouth 2 (two) times a day      ondansetron (ZOFRAN) 4 mg tablet Take 1 tablet (4 mg total) by mouth every 6 (six) hours for 12 days 12 tablet 0    oxyCODONE-acetaminophen (PERCOCET) 5-325 mg per tablet TAKE 1 TABLET BY MOUTH EVERY 6 (SIX) HOURS AS NEEDED FOR SEVERE PAIN (PAIN SCORE 7-10). MAX DAILY AMOUNT  4 TABLETS (Patient not taking: Reported on 6/18/2025)       No current facility-administered medications for this visit.

## 2025-06-19 NOTE — TELEPHONE ENCOUNTER
rescheduled date of EGD(as of today):8/7/2025  Physician performing EGD:Dr Brink  Location of EGD:[place]  Instructions reviewed with patient by:office provided instructions  Clearances: N/A

## 2025-06-24 DIAGNOSIS — R00.0 TACHYCARDIA: ICD-10-CM

## 2025-06-24 RX ORDER — METOPROLOL SUCCINATE 25 MG/1
25 TABLET, EXTENDED RELEASE ORAL DAILY
Qty: 90 TABLET | Refills: 0 | Status: SHIPPED | OUTPATIENT
Start: 2025-06-24

## 2025-08-07 ENCOUNTER — ANESTHESIA (OUTPATIENT)
Dept: GASTROENTEROLOGY | Facility: HOSPITAL | Age: 80
End: 2025-08-07
Payer: MEDICARE

## 2025-08-07 ENCOUNTER — HOSPITAL ENCOUNTER (OUTPATIENT)
Dept: GASTROENTEROLOGY | Facility: HOSPITAL | Age: 80
Setting detail: OUTPATIENT SURGERY
End: 2025-08-07
Attending: PHYSICIAN ASSISTANT
Payer: MEDICARE

## 2025-08-07 ENCOUNTER — ANESTHESIA EVENT (OUTPATIENT)
Dept: GASTROENTEROLOGY | Facility: HOSPITAL | Age: 80
End: 2025-08-07
Payer: MEDICARE

## 2025-08-07 RX ORDER — PROPOFOL 10 MG/ML
INJECTION, EMULSION INTRAVENOUS AS NEEDED
Status: DISCONTINUED | OUTPATIENT
Start: 2025-08-07 | End: 2025-08-07

## 2025-08-07 RX ORDER — LIDOCAINE HYDROCHLORIDE 20 MG/ML
INJECTION, SOLUTION EPIDURAL; INFILTRATION; INTRACAUDAL; PERINEURAL AS NEEDED
Status: DISCONTINUED | OUTPATIENT
Start: 2025-08-07 | End: 2025-08-07

## 2025-08-07 RX ORDER — SODIUM CHLORIDE, SODIUM LACTATE, POTASSIUM CHLORIDE, CALCIUM CHLORIDE 600; 310; 30; 20 MG/100ML; MG/100ML; MG/100ML; MG/100ML
INJECTION, SOLUTION INTRAVENOUS CONTINUOUS PRN
Status: DISCONTINUED | OUTPATIENT
Start: 2025-08-07 | End: 2025-08-07

## 2025-08-07 RX ADMIN — LIDOCAINE HYDROCHLORIDE 70 MG: 20 INJECTION, SOLUTION EPIDURAL; INFILTRATION; INTRACAUDAL; PERINEURAL at 12:13

## 2025-08-07 RX ADMIN — PROPOFOL 30 MG: 10 INJECTION, EMULSION INTRAVENOUS at 12:15

## 2025-08-07 RX ADMIN — PROPOFOL 30 MG: 10 INJECTION, EMULSION INTRAVENOUS at 12:16

## 2025-08-07 RX ADMIN — PROPOFOL 80 MG: 10 INJECTION, EMULSION INTRAVENOUS at 12:13

## 2025-08-07 RX ADMIN — PROPOFOL 30 MG: 10 INJECTION, EMULSION INTRAVENOUS at 12:18

## 2025-08-07 RX ADMIN — SODIUM CHLORIDE, SODIUM LACTATE, POTASSIUM CHLORIDE, AND CALCIUM CHLORIDE: .6; .31; .03; .02 INJECTION, SOLUTION INTRAVENOUS at 12:07

## (undated) DEVICE — SPONGE GAUZE 4 X 9

## (undated) DEVICE — POSITIONER TRIMANO LIMB BEACH CHAIR

## (undated) DEVICE — INTENDED FOR TISSUE SEPARATION, AND OTHER PROCEDURES THAT REQUIRE A SHARP SURGICAL BLADE TO PUNCTURE OR CUT.: Brand: BARD-PARKER SAFETY BLADES SIZE 11, STERILE

## (undated) DEVICE — FIBERTAPE 2MM X 7IN AR-7237-7

## (undated) DEVICE — SUT FIBERLINK #2 26IN AR-7235

## (undated) DEVICE — BLADE SHAVER TORPEDO 4MM 13CM  COOLCUT

## (undated) DEVICE — TURBOVAC AMBIENT SUPER 90 3.75MM

## (undated) DEVICE — LABEL MEDICATION STERILE 2 YELLOW LIDOCAINE 2 BLUE MARCAINE 2 ORANGE HEPARIN

## (undated) DEVICE — BURR  OVAL 4MM 13CM 8 FLUTE COOLCUT

## (undated) DEVICE — GLOVE INDICATOR PI UNDERGLOVE SZ 7.5 BLUE

## (undated) DEVICE — BLADE SHAVER EXCALIBUR 4MM 13CM COOLCUT

## (undated) DEVICE — BLADE SHAVER EXCALIBUR 5.5MM 130CM COOLCUT

## (undated) DEVICE — OCCLUSIVE GAUZE STRIP,3% BISMUTH TRIBROMOPHENATE IN PETROLATUM BLEND: Brand: XEROFORM

## (undated) DEVICE — PACK ARTHROSCOPY

## (undated) DEVICE — TUBING ARTHROSCOPY REDUCE PUMP

## (undated) DEVICE — TUBING ARTHROSCOPY REDUCE PATIENT

## (undated) DEVICE — GLOVE INDICATOR PI UNDERGLOVE SZ 6.5 BLUE

## (undated) DEVICE — TUBING ARTHROSCOPIC WAVE  MAIN PUMP

## (undated) DEVICE — CANNULA 5.75 X 70MM BARREL SHAPED BOWL

## (undated) DEVICE — ASTOUND IMPERVIOUS SURGICAL GOWN: Brand: CONVERTORS

## (undated) DEVICE — 3M™ TEGADERM™ TRANSPARENT FILM DRESSING FRAME STYLE, 1626W, 4 IN X 4-3/4 IN (10 CM X 12 CM), 50/CT 4CT/CASE: Brand: 3M™ TEGADERM™